# Patient Record
Sex: FEMALE | Race: WHITE | Employment: UNEMPLOYED | URBAN - METROPOLITAN AREA
[De-identification: names, ages, dates, MRNs, and addresses within clinical notes are randomized per-mention and may not be internally consistent; named-entity substitution may affect disease eponyms.]

---

## 2017-02-13 ENCOUNTER — HOSPITAL ENCOUNTER (OUTPATIENT)
Dept: ONCOLOGY | Age: 42
Discharge: HOME | End: 2017-02-28
Attending: INTERNAL MEDICINE | Admitting: INTERNAL MEDICINE

## 2017-02-13 ENCOUNTER — HOSPITAL ENCOUNTER (OUTPATIENT)
Dept: ONCOLOGY | Age: 42
Discharge: HOME | End: 2017-02-13
Admitting: INTERNAL MEDICINE

## 2017-02-13 VITALS — HEART RATE: 88 BPM | DIASTOLIC BLOOD PRESSURE: 73 MMHG | SYSTOLIC BLOOD PRESSURE: 94 MMHG

## 2017-02-13 DIAGNOSIS — G99.2 COPPER DEFICIENCY MYELONEUROPATHY (HCC): ICD-10-CM

## 2017-02-13 DIAGNOSIS — G63 COPPER DEFICIENCY MYELONEUROPATHY (HCC): ICD-10-CM

## 2017-02-13 DIAGNOSIS — E61.0 COPPER DEFICIENCY MYELONEUROPATHY (HCC): ICD-10-CM

## 2017-02-13 RX ORDER — SODIUM CHLORIDE 0.9 % (FLUSH) 0.9 %
SYRINGE (ML) INJECTION
Status: DISPENSED
Start: 2017-02-13 | End: 2017-02-14

## 2017-02-13 RX ORDER — SODIUM CHLORIDE 9 MG/ML
INJECTION, SOLUTION INTRAVENOUS
Status: DISPENSED
Start: 2017-02-13 | End: 2017-02-14

## 2017-02-13 RX ORDER — CLONIDINE HYDROCHLORIDE 0.1 MG/1
0.1 TABLET ORAL EVERY 12 HOURS
Status: ON HOLD | COMMUNITY
End: 2020-01-01 | Stop reason: HOSPADM

## 2017-02-14 ENCOUNTER — HOSPITAL ENCOUNTER (OUTPATIENT)
Dept: ONCOLOGY | Age: 42
Discharge: HOME | End: 2017-02-14
Admitting: INTERNAL MEDICINE

## 2017-02-14 VITALS
HEART RATE: 96 BPM | SYSTOLIC BLOOD PRESSURE: 99 MMHG | TEMPERATURE: 97.3 F | RESPIRATION RATE: 17 BRPM | DIASTOLIC BLOOD PRESSURE: 71 MMHG

## 2017-02-14 DIAGNOSIS — G99.2 COPPER DEFICIENCY MYELONEUROPATHY (HCC): ICD-10-CM

## 2017-02-14 DIAGNOSIS — G63 COPPER DEFICIENCY MYELONEUROPATHY (HCC): ICD-10-CM

## 2017-02-14 DIAGNOSIS — E61.0 COPPER DEFICIENCY MYELONEUROPATHY (HCC): ICD-10-CM

## 2017-02-14 RX ORDER — SODIUM CHLORIDE 9 MG/ML
INJECTION, SOLUTION INTRAVENOUS
Status: DISPENSED
Start: 2017-02-14 | End: 2017-02-15

## 2017-02-14 RX ORDER — SODIUM CHLORIDE 0.9 % (FLUSH) 0.9 %
SYRINGE (ML) INJECTION
Status: DISPENSED
Start: 2017-02-14 | End: 2017-02-15

## 2017-02-15 ENCOUNTER — HOSPITAL ENCOUNTER (OUTPATIENT)
Dept: ONCOLOGY | Age: 42
Discharge: HOME | End: 2017-02-15
Admitting: INTERNAL MEDICINE

## 2017-02-15 VITALS
HEART RATE: 86 BPM | TEMPERATURE: 97.3 F | RESPIRATION RATE: 16 BRPM | SYSTOLIC BLOOD PRESSURE: 115 MMHG | DIASTOLIC BLOOD PRESSURE: 75 MMHG

## 2017-02-15 DIAGNOSIS — G99.2 COPPER DEFICIENCY MYELONEUROPATHY (HCC): ICD-10-CM

## 2017-02-15 DIAGNOSIS — E61.0 COPPER DEFICIENCY MYELONEUROPATHY (HCC): ICD-10-CM

## 2017-02-15 DIAGNOSIS — G63 COPPER DEFICIENCY MYELONEUROPATHY (HCC): ICD-10-CM

## 2017-02-17 ENCOUNTER — HOSPITAL ENCOUNTER (OUTPATIENT)
Dept: ONCOLOGY | Age: 42
Discharge: HOME | End: 2017-02-17
Admitting: INTERNAL MEDICINE

## 2017-02-17 VITALS — HEART RATE: 102 BPM | TEMPERATURE: 99.1 F | SYSTOLIC BLOOD PRESSURE: 112 MMHG | DIASTOLIC BLOOD PRESSURE: 75 MMHG

## 2017-02-17 DIAGNOSIS — G99.2 COPPER DEFICIENCY MYELONEUROPATHY (HCC): ICD-10-CM

## 2017-02-17 DIAGNOSIS — G63 COPPER DEFICIENCY MYELONEUROPATHY (HCC): ICD-10-CM

## 2017-02-17 DIAGNOSIS — E61.0 COPPER DEFICIENCY MYELONEUROPATHY (HCC): ICD-10-CM

## 2017-02-17 RX ORDER — SODIUM CHLORIDE 9 MG/ML
INJECTION, SOLUTION INTRAVENOUS
Status: DISPENSED
Start: 2017-02-17 | End: 2017-02-18

## 2017-02-17 RX ORDER — SODIUM CHLORIDE 0.9 % (FLUSH) 0.9 %
SYRINGE (ML) INJECTION
Status: DISPENSED
Start: 2017-02-17 | End: 2017-02-18

## 2017-02-24 ENCOUNTER — HOSPITAL ENCOUNTER (OUTPATIENT)
Dept: ONCOLOGY | Age: 42
Discharge: HOME | End: 2017-02-24
Admitting: INTERNAL MEDICINE

## 2017-02-24 VITALS
DIASTOLIC BLOOD PRESSURE: 75 MMHG | RESPIRATION RATE: 16 BRPM | HEART RATE: 91 BPM | SYSTOLIC BLOOD PRESSURE: 112 MMHG | TEMPERATURE: 98.6 F

## 2017-02-24 DIAGNOSIS — E61.0 COPPER DEFICIENCY MYELONEUROPATHY (HCC): ICD-10-CM

## 2017-02-24 DIAGNOSIS — G99.2 COPPER DEFICIENCY MYELONEUROPATHY (HCC): ICD-10-CM

## 2017-02-24 DIAGNOSIS — G63 COPPER DEFICIENCY MYELONEUROPATHY (HCC): ICD-10-CM

## 2017-02-27 ENCOUNTER — HOSPITAL ENCOUNTER (OUTPATIENT)
Dept: ONCOLOGY | Age: 42
Discharge: HOME | End: 2017-02-27
Admitting: INTERNAL MEDICINE

## 2017-02-27 RX ORDER — SODIUM CHLORIDE 9 MG/ML
INJECTION, SOLUTION INTRAVENOUS
Status: DISCONTINUED
Start: 2017-02-27 | End: 2017-03-01 | Stop reason: HOSPADM

## 2017-02-27 RX ORDER — SODIUM CHLORIDE 0.9 % (FLUSH) 0.9 %
SYRINGE (ML) INJECTION
Status: DISCONTINUED
Start: 2017-02-27 | End: 2017-03-01 | Stop reason: HOSPADM

## 2017-03-01 ENCOUNTER — HOSPITAL ENCOUNTER (OUTPATIENT)
Dept: ONCOLOGY | Age: 42
Discharge: HOME | End: 2017-03-31
Attending: INTERNAL MEDICINE | Admitting: INTERNAL MEDICINE

## 2020-01-01 ENCOUNTER — TELEPHONE (OUTPATIENT)
Dept: NEUROLOGY | Age: 45
End: 2020-01-01

## 2020-01-01 ENCOUNTER — APPOINTMENT (OUTPATIENT)
Dept: GENERAL RADIOLOGY | Age: 45
DRG: 710 | End: 2020-01-01
Payer: COMMERCIAL

## 2020-01-01 ENCOUNTER — APPOINTMENT (OUTPATIENT)
Dept: MRI IMAGING | Age: 45
DRG: 134 | End: 2020-01-01
Payer: COMMERCIAL

## 2020-01-01 ENCOUNTER — APPOINTMENT (OUTPATIENT)
Dept: CT IMAGING | Age: 45
DRG: 134 | End: 2020-01-01
Payer: COMMERCIAL

## 2020-01-01 ENCOUNTER — HOSPITAL ENCOUNTER (INPATIENT)
Age: 45
LOS: 5 days | Discharge: NURSING, REHAB OR LONG-TERM CARE | DRG: 134 | End: 2020-09-23
Attending: STUDENT IN AN ORGANIZED HEALTH CARE EDUCATION/TRAINING PROGRAM | Admitting: INTERNAL MEDICINE
Payer: COMMERCIAL

## 2020-01-01 ENCOUNTER — APPOINTMENT (OUTPATIENT)
Dept: GENERAL RADIOLOGY | Age: 45
DRG: 134 | End: 2020-01-01
Payer: COMMERCIAL

## 2020-01-01 ENCOUNTER — HOSPITAL ENCOUNTER (INPATIENT)
Age: 45
LOS: 10 days | DRG: 710 | End: 2020-11-19
Attending: EMERGENCY MEDICINE | Admitting: INTERNAL MEDICINE
Payer: COMMERCIAL

## 2020-01-01 ENCOUNTER — APPOINTMENT (OUTPATIENT)
Dept: CT IMAGING | Age: 45
DRG: 137 | End: 2020-01-01
Payer: COMMERCIAL

## 2020-01-01 ENCOUNTER — APPOINTMENT (OUTPATIENT)
Dept: CT IMAGING | Age: 45
DRG: 710 | End: 2020-01-01
Payer: COMMERCIAL

## 2020-01-01 ENCOUNTER — APPOINTMENT (OUTPATIENT)
Dept: GENERAL RADIOLOGY | Age: 45
DRG: 137 | End: 2020-01-01
Payer: COMMERCIAL

## 2020-01-01 ENCOUNTER — HOSPITAL ENCOUNTER (INPATIENT)
Age: 45
LOS: 2 days | Discharge: NURSING, REHAB OR LONG-TERM CARE | DRG: 137 | End: 2020-11-06
Attending: EMERGENCY MEDICINE | Admitting: INTERNAL MEDICINE
Payer: COMMERCIAL

## 2020-01-01 VITALS
TEMPERATURE: 97.7 F | HEIGHT: 67 IN | HEART RATE: 77 BPM | SYSTOLIC BLOOD PRESSURE: 118 MMHG | WEIGHT: 132.5 LBS | BODY MASS INDEX: 20.8 KG/M2 | OXYGEN SATURATION: 92 % | RESPIRATION RATE: 16 BRPM | DIASTOLIC BLOOD PRESSURE: 75 MMHG

## 2020-01-01 VITALS
DIASTOLIC BLOOD PRESSURE: 69 MMHG | TEMPERATURE: 98.6 F | SYSTOLIC BLOOD PRESSURE: 102 MMHG | OXYGEN SATURATION: 83 % | HEIGHT: 67 IN | BODY MASS INDEX: 24.64 KG/M2 | RESPIRATION RATE: 20 BRPM | HEART RATE: 103 BPM | WEIGHT: 157 LBS

## 2020-01-01 VITALS
OXYGEN SATURATION: 91 % | BODY MASS INDEX: 24.91 KG/M2 | TEMPERATURE: 97.8 F | DIASTOLIC BLOOD PRESSURE: 80 MMHG | HEIGHT: 67 IN | SYSTOLIC BLOOD PRESSURE: 109 MMHG | HEART RATE: 79 BPM | RESPIRATION RATE: 17 BRPM | WEIGHT: 158.73 LBS

## 2020-01-01 LAB
A/G RATIO: 0.9 (ref 1.1–2.2)
A/G RATIO: 1 (ref 1.1–2.2)
A/G RATIO: 1.1 (ref 1.1–2.2)
A/G RATIO: 1.1 (ref 1.1–2.2)
A/G RATIO: 1.2 (ref 1.1–2.2)
A/G RATIO: 1.3 (ref 1.1–2.2)
A/G RATIO: 1.7 (ref 1.1–2.2)
ABO/RH: NORMAL
ABO/RH: NORMAL
ACETAMINOPHEN LEVEL: <5 UG/ML (ref 10–30)
ACETAMINOPHEN LEVEL: <5 UG/ML (ref 10–30)
ALBUMIN SERPL-MCNC: 2.1 G/DL (ref 3.4–5)
ALBUMIN SERPL-MCNC: 2.3 G/DL (ref 3.4–5)
ALBUMIN SERPL-MCNC: 2.3 G/DL (ref 3.4–5)
ALBUMIN SERPL-MCNC: 2.4 G/DL (ref 3.4–5)
ALBUMIN SERPL-MCNC: 2.4 G/DL (ref 3.4–5)
ALBUMIN SERPL-MCNC: 2.5 G/DL (ref 3.4–5)
ALBUMIN SERPL-MCNC: 2.6 G/DL (ref 3.4–5)
ALBUMIN SERPL-MCNC: 2.6 G/DL (ref 3.4–5)
ALBUMIN SERPL-MCNC: 2.8 G/DL (ref 3.4–5)
ALBUMIN SERPL-MCNC: 4 G/DL (ref 3.4–5)
ALP BLD-CCNC: 105 U/L (ref 40–129)
ALP BLD-CCNC: 117 U/L (ref 40–129)
ALP BLD-CCNC: 161 U/L (ref 40–129)
ALP BLD-CCNC: 72 U/L (ref 40–129)
ALP BLD-CCNC: 78 U/L (ref 40–129)
ALP BLD-CCNC: 78 U/L (ref 40–129)
ALP BLD-CCNC: 83 U/L (ref 40–129)
ALP BLD-CCNC: 85 U/L (ref 40–129)
ALP BLD-CCNC: 85 U/L (ref 40–129)
ALP BLD-CCNC: 91 U/L (ref 40–129)
ALP BLD-CCNC: 95 U/L (ref 40–129)
ALP BLD-CCNC: 97 U/L (ref 40–129)
ALT SERPL-CCNC: 23 U/L (ref 10–40)
ALT SERPL-CCNC: 24 U/L (ref 10–40)
ALT SERPL-CCNC: 26 U/L (ref 10–40)
ALT SERPL-CCNC: 27 U/L (ref 10–40)
ALT SERPL-CCNC: 27 U/L (ref 10–40)
ALT SERPL-CCNC: 28 U/L (ref 10–40)
ALT SERPL-CCNC: 31 U/L (ref 10–40)
ALT SERPL-CCNC: 34 U/L (ref 10–40)
ALT SERPL-CCNC: 39 U/L (ref 10–40)
ALT SERPL-CCNC: 42 U/L (ref 10–40)
ALT SERPL-CCNC: 54 U/L (ref 10–40)
ALT SERPL-CCNC: 70 U/L (ref 10–40)
AMMONIA: 32 UMOL/L (ref 11–51)
AMPHETAMINE SCREEN, URINE: NORMAL
AMPHETAMINE SCREEN, URINE: NORMAL
ANION GAP SERPL CALCULATED.3IONS-SCNC: 0 MMOL/L (ref 3–16)
ANION GAP SERPL CALCULATED.3IONS-SCNC: 0 MMOL/L (ref 3–16)
ANION GAP SERPL CALCULATED.3IONS-SCNC: 1 MMOL/L (ref 3–16)
ANION GAP SERPL CALCULATED.3IONS-SCNC: 10 MMOL/L (ref 3–16)
ANION GAP SERPL CALCULATED.3IONS-SCNC: 10 MMOL/L (ref 3–16)
ANION GAP SERPL CALCULATED.3IONS-SCNC: 11 MMOL/L (ref 3–16)
ANION GAP SERPL CALCULATED.3IONS-SCNC: 2 MMOL/L (ref 3–16)
ANION GAP SERPL CALCULATED.3IONS-SCNC: 3 MMOL/L (ref 3–16)
ANION GAP SERPL CALCULATED.3IONS-SCNC: 3 MMOL/L (ref 3–16)
ANION GAP SERPL CALCULATED.3IONS-SCNC: 4 MMOL/L (ref 3–16)
ANION GAP SERPL CALCULATED.3IONS-SCNC: 5 MMOL/L (ref 3–16)
ANION GAP SERPL CALCULATED.3IONS-SCNC: 6 MMOL/L (ref 3–16)
ANION GAP SERPL CALCULATED.3IONS-SCNC: 7 MMOL/L (ref 3–16)
ANION GAP SERPL CALCULATED.3IONS-SCNC: 7 MMOL/L (ref 3–16)
ANION GAP SERPL CALCULATED.3IONS-SCNC: 8 MMOL/L (ref 3–16)
ANION GAP SERPL CALCULATED.3IONS-SCNC: 8 MMOL/L (ref 3–16)
ANION GAP SERPL CALCULATED.3IONS-SCNC: 9 MMOL/L (ref 3–16)
ANTIBODY SCREEN: NORMAL
APTT: 170 SEC (ref 24.2–36.2)
APTT: 26.6 SEC (ref 24.2–36.2)
APTT: 32.2 SEC (ref 24.2–36.2)
APTT: 35.1 SEC (ref 24.2–36.2)
APTT: 39.9 SEC (ref 24.2–36.2)
APTT: 47.2 SEC (ref 24.2–36.2)
AST SERPL-CCNC: 192 U/L (ref 15–37)
AST SERPL-CCNC: 20 U/L (ref 15–37)
AST SERPL-CCNC: 20 U/L (ref 15–37)
AST SERPL-CCNC: 21 U/L (ref 15–37)
AST SERPL-CCNC: 22 U/L (ref 15–37)
AST SERPL-CCNC: 22 U/L (ref 15–37)
AST SERPL-CCNC: 24 U/L (ref 15–37)
AST SERPL-CCNC: 35 U/L (ref 15–37)
AST SERPL-CCNC: 36 U/L (ref 15–37)
AST SERPL-CCNC: 41 U/L (ref 15–37)
AST SERPL-CCNC: 59 U/L (ref 15–37)
AST SERPL-CCNC: 74 U/L (ref 15–37)
BACTERIA: ABNORMAL /HPF
BANDED NEUTROPHILS RELATIVE PERCENT: 3 % (ref 0–7)
BARBITURATE SCREEN URINE: NORMAL
BARBITURATE SCREEN URINE: NORMAL
BASE EXCESS ARTERIAL: 11.5 MMOL/L (ref -3–3)
BASE EXCESS ARTERIAL: 13.1 MMOL/L (ref -3–3)
BASE EXCESS ARTERIAL: 13.7 MMOL/L (ref -3–3)
BASE EXCESS ARTERIAL: 17.9 MMOL/L (ref -3–3)
BASE EXCESS ARTERIAL: 19.2 MMOL/L (ref -3–3)
BASE EXCESS ARTERIAL: 20.2 MMOL/L (ref -3–3)
BASE EXCESS ARTERIAL: 21.9 MMOL/L (ref -3–3)
BASE EXCESS ARTERIAL: 22 MMOL/L (ref -3–3)
BASE EXCESS ARTERIAL: 22.8 MMOL/L (ref -3–3)
BASE EXCESS ARTERIAL: 22.9 MMOL/L (ref -3–3)
BASE EXCESS ARTERIAL: 4 (ref -3–3)
BASE EXCESS ARTERIAL: 6.4 MMOL/L (ref -3–3)
BASE EXCESS ARTERIAL: 7 (ref -3–3)
BASOPHILS ABSOLUTE: 0 K/UL (ref 0–0.2)
BASOPHILS ABSOLUTE: 0.1 K/UL (ref 0–0.2)
BASOPHILS ABSOLUTE: 0.1 K/UL (ref 0–0.2)
BASOPHILS RELATIVE PERCENT: 0 %
BASOPHILS RELATIVE PERCENT: 0.1 %
BASOPHILS RELATIVE PERCENT: 0.2 %
BASOPHILS RELATIVE PERCENT: 0.3 %
BASOPHILS RELATIVE PERCENT: 0.4 %
BASOPHILS RELATIVE PERCENT: 0.4 %
BASOPHILS RELATIVE PERCENT: 0.9 %
BENZODIAZEPINE SCREEN, URINE: NORMAL
BENZODIAZEPINE SCREEN, URINE: NORMAL
BILIRUB SERPL-MCNC: 0.3 MG/DL (ref 0–1)
BILIRUB SERPL-MCNC: 0.4 MG/DL (ref 0–1)
BILIRUB SERPL-MCNC: 0.4 MG/DL (ref 0–1)
BILIRUB SERPL-MCNC: 0.5 MG/DL (ref 0–1)
BILIRUB SERPL-MCNC: 1 MG/DL (ref 0–1)
BILIRUB SERPL-MCNC: 1.2 MG/DL (ref 0–1)
BILIRUB SERPL-MCNC: 1.2 MG/DL (ref 0–1)
BILIRUB SERPL-MCNC: <0.2 MG/DL (ref 0–1)
BILIRUBIN URINE: ABNORMAL
BILIRUBIN URINE: NEGATIVE
BILIRUBIN URINE: NEGATIVE
BLOOD BANK DISPENSE STATUS: NORMAL
BLOOD BANK DISPENSE STATUS: NORMAL
BLOOD BANK PRODUCT CODE: NORMAL
BLOOD BANK PRODUCT CODE: NORMAL
BLOOD CULTURE, ROUTINE: NORMAL
BLOOD, URINE: NEGATIVE
BPU ID: NORMAL
BPU ID: NORMAL
BUN BLDV-MCNC: 10 MG/DL (ref 7–20)
BUN BLDV-MCNC: 10 MG/DL (ref 7–20)
BUN BLDV-MCNC: 14 MG/DL (ref 7–20)
BUN BLDV-MCNC: 14 MG/DL (ref 7–20)
BUN BLDV-MCNC: 15 MG/DL (ref 7–20)
BUN BLDV-MCNC: 16 MG/DL (ref 7–20)
BUN BLDV-MCNC: 16 MG/DL (ref 7–20)
BUN BLDV-MCNC: 19 MG/DL (ref 7–20)
BUN BLDV-MCNC: 21 MG/DL (ref 7–20)
BUN BLDV-MCNC: 22 MG/DL (ref 7–20)
BUN BLDV-MCNC: 4 MG/DL (ref 7–20)
BUN BLDV-MCNC: 5 MG/DL (ref 7–20)
BUN BLDV-MCNC: 5 MG/DL (ref 7–20)
BUN BLDV-MCNC: 6 MG/DL (ref 7–20)
BUN BLDV-MCNC: 7 MG/DL (ref 7–20)
BUN BLDV-MCNC: 8 MG/DL (ref 7–20)
BUN BLDV-MCNC: 9 MG/DL (ref 7–20)
C-REACTIVE PROTEIN: 13.9 MG/L (ref 0–5.1)
C-REACTIVE PROTEIN: 18.4 MG/L (ref 0–5.1)
C-REACTIVE PROTEIN: 22.8 MG/L (ref 0–5.1)
C-REACTIVE PROTEIN: 24.5 MG/L (ref 0–5.1)
C-REACTIVE PROTEIN: 25.4 MG/L (ref 0–5.1)
C-REACTIVE PROTEIN: 29.5 MG/L (ref 0–5.1)
C-REACTIVE PROTEIN: 98.1 MG/L (ref 0–5.1)
CALCIUM SERPL-MCNC: 7.1 MG/DL (ref 8.3–10.6)
CALCIUM SERPL-MCNC: 7.6 MG/DL (ref 8.3–10.6)
CALCIUM SERPL-MCNC: 7.9 MG/DL (ref 8.3–10.6)
CALCIUM SERPL-MCNC: 7.9 MG/DL (ref 8.3–10.6)
CALCIUM SERPL-MCNC: 8 MG/DL (ref 8.3–10.6)
CALCIUM SERPL-MCNC: 8 MG/DL (ref 8.3–10.6)
CALCIUM SERPL-MCNC: 8.1 MG/DL (ref 8.3–10.6)
CALCIUM SERPL-MCNC: 8.1 MG/DL (ref 8.3–10.6)
CALCIUM SERPL-MCNC: 8.2 MG/DL (ref 8.3–10.6)
CALCIUM SERPL-MCNC: 8.2 MG/DL (ref 8.3–10.6)
CALCIUM SERPL-MCNC: 8.4 MG/DL (ref 8.3–10.6)
CALCIUM SERPL-MCNC: 8.5 MG/DL (ref 8.3–10.6)
CALCIUM SERPL-MCNC: 8.7 MG/DL (ref 8.3–10.6)
CALCIUM SERPL-MCNC: 8.8 MG/DL (ref 8.3–10.6)
CALCIUM SERPL-MCNC: 8.8 MG/DL (ref 8.3–10.6)
CALCIUM SERPL-MCNC: 9 MG/DL (ref 8.3–10.6)
CALCIUM SERPL-MCNC: 9 MG/DL (ref 8.3–10.6)
CALCIUM SERPL-MCNC: 9.1 MG/DL (ref 8.3–10.6)
CALCIUM SERPL-MCNC: 9.2 MG/DL (ref 8.3–10.6)
CALCIUM SERPL-MCNC: 9.2 MG/DL (ref 8.3–10.6)
CANNABINOID SCREEN URINE: NORMAL
CANNABINOID SCREEN URINE: NORMAL
CARBOXYHEMOGLOBIN ARTERIAL: 0.5 % (ref 0–1.5)
CARBOXYHEMOGLOBIN ARTERIAL: 0.9 % (ref 0–1.5)
CARBOXYHEMOGLOBIN ARTERIAL: 0.9 % (ref 0–1.5)
CARBOXYHEMOGLOBIN ARTERIAL: 1 % (ref 0–1.5)
CARBOXYHEMOGLOBIN ARTERIAL: 1.2 % (ref 0–1.5)
CARBOXYHEMOGLOBIN ARTERIAL: 1.4 % (ref 0–1.5)
CARBOXYHEMOGLOBIN ARTERIAL: 1.4 % (ref 0–1.5)
CARBOXYHEMOGLOBIN ARTERIAL: 1.5 % (ref 0–1.5)
CARBOXYHEMOGLOBIN ARTERIAL: 1.6 % (ref 0–1.5)
CARBOXYHEMOGLOBIN ARTERIAL: 3.7 % (ref 0–1.5)
CARBOXYHEMOGLOBIN ARTERIAL: 3.9 % (ref 0–1.5)
CHLORIDE BLD-SCNC: 100 MMOL/L (ref 99–110)
CHLORIDE BLD-SCNC: 101 MMOL/L (ref 99–110)
CHLORIDE BLD-SCNC: 101 MMOL/L (ref 99–110)
CHLORIDE BLD-SCNC: 102 MMOL/L (ref 99–110)
CHLORIDE BLD-SCNC: 102 MMOL/L (ref 99–110)
CHLORIDE BLD-SCNC: 103 MMOL/L (ref 99–110)
CHLORIDE BLD-SCNC: 104 MMOL/L (ref 99–110)
CHLORIDE BLD-SCNC: 104 MMOL/L (ref 99–110)
CHLORIDE BLD-SCNC: 105 MMOL/L (ref 99–110)
CHLORIDE BLD-SCNC: 106 MMOL/L (ref 99–110)
CHLORIDE BLD-SCNC: 96 MMOL/L (ref 99–110)
CHLORIDE BLD-SCNC: 96 MMOL/L (ref 99–110)
CHLORIDE BLD-SCNC: 97 MMOL/L (ref 99–110)
CHLORIDE BLD-SCNC: 97 MMOL/L (ref 99–110)
CHLORIDE BLD-SCNC: 98 MMOL/L (ref 99–110)
CHLORIDE BLD-SCNC: 99 MMOL/L (ref 99–110)
CLARITY: ABNORMAL
CLARITY: ABNORMAL
CLARITY: CLEAR
CO2: 26 MMOL/L (ref 21–32)
CO2: 31 MMOL/L (ref 21–32)
CO2: 32 MMOL/L (ref 21–32)
CO2: 32 MMOL/L (ref 21–32)
CO2: 33 MMOL/L (ref 21–32)
CO2: 34 MMOL/L (ref 21–32)
CO2: 35 MMOL/L (ref 21–32)
CO2: 35 MMOL/L (ref 21–32)
CO2: 36 MMOL/L (ref 21–32)
CO2: 37 MMOL/L (ref 21–32)
CO2: 39 MMOL/L (ref 21–32)
CO2: 40 MMOL/L (ref 21–32)
CO2: 41 MMOL/L (ref 21–32)
CO2: 43 MMOL/L (ref 21–32)
CO2: 43 MMOL/L (ref 21–32)
CO2: 44 MMOL/L (ref 21–32)
CO2: 45 MMOL/L (ref 21–32)
CO2: 45 MMOL/L (ref 21–32)
CO2: 46 MMOL/L (ref 21–32)
CO2: 48 MMOL/L (ref 21–32)
COCAINE METABOLITE SCREEN URINE: NORMAL
COCAINE METABOLITE SCREEN URINE: NORMAL
COLOR: YELLOW
COMMENT UA: ABNORMAL
COPPER: 28.6 UG/DL (ref 80–155)
CREAT SERPL-MCNC: 0.6 MG/DL (ref 0.6–1.1)
CREAT SERPL-MCNC: <0.5 MG/DL (ref 0.6–1.1)
CULTURE, BLOOD 2: NORMAL
D DIMER: 205 NG/ML DDU (ref 0–229)
D DIMER: 216 NG/ML DDU (ref 0–229)
D DIMER: 218 NG/ML DDU (ref 0–229)
D DIMER: 218 NG/ML DDU (ref 0–229)
D DIMER: 237 NG/ML DDU (ref 0–229)
D DIMER: 291 NG/ML DDU (ref 0–229)
D DIMER: 300 NG/ML DDU (ref 0–229)
D DIMER: 305 NG/ML DDU (ref 0–229)
D DIMER: 362 NG/ML DDU (ref 0–229)
DESCRIPTION BLOOD BANK: NORMAL
DESCRIPTION BLOOD BANK: NORMAL
EKG ATRIAL RATE: 100 BPM
EKG ATRIAL RATE: 101 BPM
EKG ATRIAL RATE: 129 BPM
EKG ATRIAL RATE: 168 BPM
EKG ATRIAL RATE: 288 BPM
EKG ATRIAL RATE: 66 BPM
EKG ATRIAL RATE: 92 BPM
EKG ATRIAL RATE: 96 BPM
EKG DIAGNOSIS: NORMAL
EKG P AXIS: 48 DEGREES
EKG P AXIS: 48 DEGREES
EKG P AXIS: 57 DEGREES
EKG P AXIS: 67 DEGREES
EKG P AXIS: 78 DEGREES
EKG P AXIS: 82 DEGREES
EKG P AXIS: 85 DEGREES
EKG P-R INTERVAL: 120 MS
EKG P-R INTERVAL: 128 MS
EKG P-R INTERVAL: 130 MS
EKG P-R INTERVAL: 134 MS
EKG P-R INTERVAL: 140 MS
EKG P-R INTERVAL: 142 MS
EKG P-R INTERVAL: 142 MS
EKG Q-T INTERVAL: 234 MS
EKG Q-T INTERVAL: 296 MS
EKG Q-T INTERVAL: 322 MS
EKG Q-T INTERVAL: 328 MS
EKG Q-T INTERVAL: 336 MS
EKG Q-T INTERVAL: 360 MS
EKG Q-T INTERVAL: 400 MS
EKG Q-T INTERVAL: 402 MS
EKG QRS DURATION: 106 MS
EKG QRS DURATION: 106 MS
EKG QRS DURATION: 70 MS
EKG QRS DURATION: 80 MS
EKG QRS DURATION: 82 MS
EKG QRS DURATION: 88 MS
EKG QRS DURATION: 88 MS
EKG QRS DURATION: 96 MS
EKG QTC CALCULATION (BAZETT): 391 MS
EKG QTC CALCULATION (BAZETT): 415 MS
EKG QTC CALCULATION (BAZETT): 421 MS
EKG QTC CALCULATION (BAZETT): 423 MS
EKG QTC CALCULATION (BAZETT): 433 MS
EKG QTC CALCULATION (BAZETT): 451 MS
EKG QTC CALCULATION (BAZETT): 466 MS
EKG QTC CALCULATION (BAZETT): 505 MS
EKG R AXIS: 20 DEGREES
EKG R AXIS: 39 DEGREES
EKG R AXIS: 40 DEGREES
EKG R AXIS: 44 DEGREES
EKG R AXIS: 52 DEGREES
EKG R AXIS: 59 DEGREES
EKG R AXIS: 78 DEGREES
EKG R AXIS: 79 DEGREES
EKG T AXIS: 142 DEGREES
EKG T AXIS: 237 DEGREES
EKG T AXIS: 251 DEGREES
EKG T AXIS: 252 DEGREES
EKG T AXIS: 56 DEGREES
EKG T AXIS: 59 DEGREES
EKG T AXIS: 75 DEGREES
EKG T AXIS: 88 DEGREES
EKG VENTRICULAR RATE: 100 BPM
EKG VENTRICULAR RATE: 101 BPM
EKG VENTRICULAR RATE: 118 BPM
EKG VENTRICULAR RATE: 129 BPM
EKG VENTRICULAR RATE: 168 BPM
EKG VENTRICULAR RATE: 66 BPM
EKG VENTRICULAR RATE: 92 BPM
EKG VENTRICULAR RATE: 96 BPM
EOSINOPHILS ABSOLUTE: 0 K/UL (ref 0–0.6)
EOSINOPHILS RELATIVE PERCENT: 0 %
EOSINOPHILS RELATIVE PERCENT: 0.1 %
EOSINOPHILS RELATIVE PERCENT: 0.2 %
EOSINOPHILS RELATIVE PERCENT: 0.3 %
EOSINOPHILS RELATIVE PERCENT: 0.3 %
EPITHELIAL CELLS, UA: 3 /HPF (ref 0–5)
EPITHELIAL CELLS, UA: ABNORMAL /HPF (ref 0–5)
ETHANOL: NORMAL MG/DL (ref 0–0.08)
ETHANOL: NORMAL MG/DL (ref 0–0.08)
FERRITIN: 1375 NG/ML (ref 15–150)
FIBRINOGEN: 272 MG/DL (ref 200–397)
FOLATE: 17.16 NG/ML (ref 4.78–24.2)
GFR AFRICAN AMERICAN: >60
GFR NON-AFRICAN AMERICAN: >60
GLOBULIN: 1.6 G/DL
GLOBULIN: 2 G/DL
GLOBULIN: 2 G/DL
GLOBULIN: 2.1 G/DL
GLOBULIN: 2.2 G/DL
GLOBULIN: 2.3 G/DL
GLOBULIN: 2.4 G/DL
GLOBULIN: 2.6 G/DL
GLOBULIN: 2.7 G/DL
GLUCOSE BLD-MCNC: 101 MG/DL (ref 70–99)
GLUCOSE BLD-MCNC: 109 MG/DL (ref 70–99)
GLUCOSE BLD-MCNC: 110 MG/DL (ref 70–99)
GLUCOSE BLD-MCNC: 114 MG/DL (ref 70–99)
GLUCOSE BLD-MCNC: 114 MG/DL (ref 70–99)
GLUCOSE BLD-MCNC: 115 MG/DL (ref 70–99)
GLUCOSE BLD-MCNC: 119 MG/DL (ref 70–99)
GLUCOSE BLD-MCNC: 119 MG/DL (ref 70–99)
GLUCOSE BLD-MCNC: 124 MG/DL (ref 70–99)
GLUCOSE BLD-MCNC: 135 MG/DL (ref 70–99)
GLUCOSE BLD-MCNC: 140 MG/DL (ref 70–99)
GLUCOSE BLD-MCNC: 147 MG/DL (ref 70–99)
GLUCOSE BLD-MCNC: 152 MG/DL (ref 70–99)
GLUCOSE BLD-MCNC: 160 MG/DL (ref 70–99)
GLUCOSE BLD-MCNC: 163 MG/DL (ref 70–99)
GLUCOSE BLD-MCNC: 163 MG/DL (ref 70–99)
GLUCOSE BLD-MCNC: 179 MG/DL (ref 70–99)
GLUCOSE BLD-MCNC: 216 MG/DL (ref 70–99)
GLUCOSE BLD-MCNC: 233 MG/DL (ref 70–99)
GLUCOSE BLD-MCNC: 280 MG/DL (ref 70–99)
GLUCOSE BLD-MCNC: 69 MG/DL (ref 70–99)
GLUCOSE BLD-MCNC: 72 MG/DL (ref 70–99)
GLUCOSE BLD-MCNC: 86 MG/DL (ref 70–99)
GLUCOSE BLD-MCNC: 88 MG/DL (ref 70–99)
GLUCOSE BLD-MCNC: 89 MG/DL (ref 70–99)
GLUCOSE BLD-MCNC: 89 MG/DL (ref 70–99)
GLUCOSE BLD-MCNC: 91 MG/DL (ref 70–99)
GLUCOSE BLD-MCNC: 96 MG/DL (ref 70–99)
GLUCOSE URINE: NEGATIVE MG/DL
GRAM STAIN RESULT: ABNORMAL
HCG QUALITATIVE: NEGATIVE
HCG QUALITATIVE: NEGATIVE
HCO3 ARTERIAL: 31.7 MMOL/L (ref 21–29)
HCO3 ARTERIAL: 32.7 MMOL/L (ref 21–29)
HCO3 ARTERIAL: 33.9 MMOL/L (ref 21–29)
HCO3 ARTERIAL: 37.4 MMOL/L (ref 21–29)
HCO3 ARTERIAL: 41.4 MMOL/L (ref 21–29)
HCO3 ARTERIAL: 41.5 MMOL/L (ref 21–29)
HCO3 ARTERIAL: 46.3 MMOL/L (ref 21–29)
HCO3 ARTERIAL: 47 MMOL/L (ref 21–29)
HCO3 ARTERIAL: 47.3 MMOL/L (ref 21–29)
HCO3 ARTERIAL: 47.4 MMOL/L (ref 21–29)
HCO3 ARTERIAL: 48.5 MMOL/L (ref 21–29)
HCO3 ARTERIAL: 50 MMOL/L (ref 21–29)
HCO3 ARTERIAL: 50.7 MMOL/L (ref 21–29)
HCT VFR BLD CALC: 16.5 % (ref 36–48)
HCT VFR BLD CALC: 28.9 % (ref 36–48)
HCT VFR BLD CALC: 29.3 % (ref 36–48)
HCT VFR BLD CALC: 30 % (ref 36–48)
HCT VFR BLD CALC: 30.8 % (ref 36–48)
HCT VFR BLD CALC: 31 % (ref 36–48)
HCT VFR BLD CALC: 31.4 % (ref 36–48)
HCT VFR BLD CALC: 31.4 % (ref 36–48)
HCT VFR BLD CALC: 31.7 % (ref 36–48)
HCT VFR BLD CALC: 32 % (ref 36–48)
HCT VFR BLD CALC: 32 % (ref 36–48)
HCT VFR BLD CALC: 32.2 % (ref 36–48)
HCT VFR BLD CALC: 32.3 % (ref 36–48)
HCT VFR BLD CALC: 32.8 % (ref 36–48)
HCT VFR BLD CALC: 33 % (ref 36–48)
HCT VFR BLD CALC: 33 % (ref 36–48)
HCT VFR BLD CALC: 33.4 % (ref 36–48)
HCT VFR BLD CALC: 34.5 % (ref 36–48)
HCT VFR BLD CALC: 34.9 % (ref 36–48)
HCT VFR BLD CALC: 35.9 % (ref 36–48)
HCT VFR BLD CALC: 36.1 % (ref 36–48)
HCT VFR BLD CALC: 39.3 % (ref 36–48)
HEMOGLOBIN, ART, EXTENDED: 10 G/DL (ref 12–16)
HEMOGLOBIN, ART, EXTENDED: 10.1 G/DL (ref 12–16)
HEMOGLOBIN, ART, EXTENDED: 10.2 G/DL (ref 12–16)
HEMOGLOBIN, ART, EXTENDED: 10.3 G/DL (ref 12–16)
HEMOGLOBIN, ART, EXTENDED: 10.3 G/DL (ref 12–16)
HEMOGLOBIN, ART, EXTENDED: 10.7 G/DL (ref 12–16)
HEMOGLOBIN, ART, EXTENDED: 11.2 G/DL (ref 12–16)
HEMOGLOBIN, ART, EXTENDED: 11.4 G/DL (ref 12–16)
HEMOGLOBIN, ART, EXTENDED: 11.7 G/DL (ref 12–16)
HEMOGLOBIN, ART, EXTENDED: 12.1 G/DL (ref 12–16)
HEMOGLOBIN, ART, EXTENDED: 9.2 G/DL (ref 12–16)
HEMOGLOBIN: 10.1 G/DL (ref 12–16)
HEMOGLOBIN: 10.1 G/DL (ref 12–16)
HEMOGLOBIN: 10.2 G/DL (ref 12–16)
HEMOGLOBIN: 10.4 G/DL (ref 12–16)
HEMOGLOBIN: 10.4 G/DL (ref 12–16)
HEMOGLOBIN: 10.5 G/DL (ref 12–16)
HEMOGLOBIN: 11 G/DL (ref 12–16)
HEMOGLOBIN: 11.2 G/DL (ref 12–16)
HEMOGLOBIN: 11.3 G/DL (ref 12–16)
HEMOGLOBIN: 11.3 G/DL (ref 12–16)
HEMOGLOBIN: 12.2 G/DL (ref 12–16)
HEMOGLOBIN: 5.1 G/DL (ref 12–16)
HEMOGLOBIN: 8.8 G/DL (ref 12–16)
HEMOGLOBIN: 8.8 G/DL (ref 12–16)
HEMOGLOBIN: 9.5 G/DL (ref 12–16)
HEMOGLOBIN: 9.7 G/DL (ref 12–16)
HEMOGLOBIN: 9.8 G/DL (ref 12–16)
HYALINE CASTS: 13 /LPF (ref 0–8)
HYALINE CASTS: ABNORMAL /LPF (ref 0–2)
INR BLD: 0.97 (ref 0.86–1.14)
INR BLD: 1.21 (ref 0.86–1.14)
INR BLD: 1.25 (ref 0.86–1.14)
INR BLD: 1.6 (ref 0.86–1.14)
IRON SATURATION: 19 % (ref 15–50)
IRON: 56 UG/DL (ref 37–145)
KETONES, URINE: NEGATIVE MG/DL
L. PNEUMOPHILA SEROGP 1 UR AG: NORMAL
LACTATE DEHYDROGENASE: 165 U/L (ref 100–190)
LACTATE DEHYDROGENASE: 189 U/L (ref 100–190)
LACTATE DEHYDROGENASE: 227 U/L (ref 100–190)
LACTATE DEHYDROGENASE: 365 U/L (ref 100–190)
LACTATE: 4.85 MMOL/L (ref 0.4–2)
LACTIC ACID, SEPSIS: 0.4 MMOL/L (ref 0.4–1.9)
LACTIC ACID, SEPSIS: 0.6 MMOL/L (ref 0.4–1.9)
LACTIC ACID: 0.5 MMOL/L (ref 0.4–2)
LACTIC ACID: 1.7 MMOL/L (ref 0.4–2)
LACTIC ACID: 4.5 MMOL/L (ref 0.4–2)
LEFT VENTRICULAR EJECTION FRACTION HIGH VALUE: 50 %
LEFT VENTRICULAR EJECTION FRACTION MODE: NORMAL
LEUKOCYTE ESTERASE, URINE: NEGATIVE
LIPASE: 19 U/L (ref 13–60)
LIPASE: 9 U/L (ref 13–60)
LV EF: 50 %
LVEF MODALITY: NORMAL
LYMPHOCYTES ABSOLUTE: 0.2 K/UL (ref 1–5.1)
LYMPHOCYTES ABSOLUTE: 0.3 K/UL (ref 1–5.1)
LYMPHOCYTES ABSOLUTE: 0.4 K/UL (ref 1–5.1)
LYMPHOCYTES ABSOLUTE: 0.4 K/UL (ref 1–5.1)
LYMPHOCYTES ABSOLUTE: 0.5 K/UL (ref 1–5.1)
LYMPHOCYTES ABSOLUTE: 1 K/UL (ref 1–5.1)
LYMPHOCYTES ABSOLUTE: 2.1 K/UL (ref 1–5.1)
LYMPHOCYTES RELATIVE PERCENT: 1.1 %
LYMPHOCYTES RELATIVE PERCENT: 15.4 %
LYMPHOCYTES RELATIVE PERCENT: 3.1 %
LYMPHOCYTES RELATIVE PERCENT: 4.1 %
LYMPHOCYTES RELATIVE PERCENT: 4.3 %
LYMPHOCYTES RELATIVE PERCENT: 4.3 %
LYMPHOCYTES RELATIVE PERCENT: 4.4 %
LYMPHOCYTES RELATIVE PERCENT: 4.6 %
LYMPHOCYTES RELATIVE PERCENT: 4.8 %
LYMPHOCYTES RELATIVE PERCENT: 6 %
LYMPHOCYTES RELATIVE PERCENT: 6.3 %
LYMPHOCYTES RELATIVE PERCENT: 6.8 %
LYMPHOCYTES RELATIVE PERCENT: 8 %
Lab: NORMAL
Lab: NORMAL
MACROCYTES: ABNORMAL
MAGNESIUM: 1.5 MG/DL (ref 1.8–2.4)
MAGNESIUM: 1.7 MG/DL (ref 1.8–2.4)
MAGNESIUM: 1.7 MG/DL (ref 1.8–2.4)
MAGNESIUM: 1.8 MG/DL (ref 1.8–2.4)
MAGNESIUM: 1.9 MG/DL (ref 1.8–2.4)
MAGNESIUM: 2.2 MG/DL (ref 1.8–2.4)
MCH RBC QN AUTO: 30.1 PG (ref 26–34)
MCH RBC QN AUTO: 30.2 PG (ref 26–34)
MCH RBC QN AUTO: 30.2 PG (ref 26–34)
MCH RBC QN AUTO: 30.3 PG (ref 26–34)
MCH RBC QN AUTO: 30.9 PG (ref 26–34)
MCH RBC QN AUTO: 30.9 PG (ref 26–34)
MCH RBC QN AUTO: 31.1 PG (ref 26–34)
MCH RBC QN AUTO: 31.3 PG (ref 26–34)
MCH RBC QN AUTO: 32.2 PG (ref 26–34)
MCH RBC QN AUTO: 32.3 PG (ref 26–34)
MCH RBC QN AUTO: 32.5 PG (ref 26–34)
MCH RBC QN AUTO: 32.6 PG (ref 26–34)
MCH RBC QN AUTO: 33.1 PG (ref 26–34)
MCHC RBC AUTO-ENTMCNC: 30.4 G/DL (ref 31–36)
MCHC RBC AUTO-ENTMCNC: 30.5 G/DL (ref 31–36)
MCHC RBC AUTO-ENTMCNC: 30.7 G/DL (ref 31–36)
MCHC RBC AUTO-ENTMCNC: 30.8 G/DL (ref 31–36)
MCHC RBC AUTO-ENTMCNC: 30.9 G/DL (ref 31–36)
MCHC RBC AUTO-ENTMCNC: 31.1 G/DL (ref 31–36)
MCHC RBC AUTO-ENTMCNC: 31.1 G/DL (ref 31–36)
MCHC RBC AUTO-ENTMCNC: 31.3 G/DL (ref 31–36)
MCHC RBC AUTO-ENTMCNC: 32.2 G/DL (ref 31–36)
MCHC RBC AUTO-ENTMCNC: 32.2 G/DL (ref 31–36)
MCHC RBC AUTO-ENTMCNC: 32.3 G/DL (ref 31–36)
MCHC RBC AUTO-ENTMCNC: 32.4 G/DL (ref 31–36)
MCHC RBC AUTO-ENTMCNC: 32.5 G/DL (ref 31–36)
MCHC RBC AUTO-ENTMCNC: 32.5 G/DL (ref 31–36)
MCHC RBC AUTO-ENTMCNC: 32.6 G/DL (ref 31–36)
MCHC RBC AUTO-ENTMCNC: 32.9 G/DL (ref 31–36)
MCV RBC AUTO: 100.7 FL (ref 80–100)
MCV RBC AUTO: 100.9 FL (ref 80–100)
MCV RBC AUTO: 101.4 FL (ref 80–100)
MCV RBC AUTO: 101.7 FL (ref 80–100)
MCV RBC AUTO: 96.2 FL (ref 80–100)
MCV RBC AUTO: 97.3 FL (ref 80–100)
MCV RBC AUTO: 97.4 FL (ref 80–100)
MCV RBC AUTO: 97.4 FL (ref 80–100)
MCV RBC AUTO: 97.7 FL (ref 80–100)
MCV RBC AUTO: 97.8 FL (ref 80–100)
MCV RBC AUTO: 97.9 FL (ref 80–100)
MCV RBC AUTO: 98.1 FL (ref 80–100)
MCV RBC AUTO: 98.8 FL (ref 80–100)
MCV RBC AUTO: 99.5 FL (ref 80–100)
MCV RBC AUTO: 99.6 FL (ref 80–100)
MCV RBC AUTO: 99.6 FL (ref 80–100)
MCV RBC AUTO: 99.8 FL (ref 80–100)
MCV RBC AUTO: 99.9 FL (ref 80–100)
METHADONE SCREEN, URINE: NORMAL
METHADONE SCREEN, URINE: NORMAL
METHEMOGLOBIN ARTERIAL: 0.1 %
METHEMOGLOBIN ARTERIAL: 0.2 %
METHEMOGLOBIN ARTERIAL: 0.3 %
METHEMOGLOBIN ARTERIAL: 0.4 %
METHEMOGLOBIN ARTERIAL: 0.4 %
METHEMOGLOBIN ARTERIAL: 0.5 %
METHEMOGLOBIN ARTERIAL: 0.7 %
MICROSCOPIC EXAMINATION: NORMAL
MICROSCOPIC EXAMINATION: YES
MICROSCOPIC EXAMINATION: YES
MONOCYTES ABSOLUTE: 0.1 K/UL (ref 0–1.3)
MONOCYTES ABSOLUTE: 0.1 K/UL (ref 0–1.3)
MONOCYTES ABSOLUTE: 0.2 K/UL (ref 0–1.3)
MONOCYTES ABSOLUTE: 0.3 K/UL (ref 0–1.3)
MONOCYTES ABSOLUTE: 0.3 K/UL (ref 0–1.3)
MONOCYTES ABSOLUTE: 0.4 K/UL (ref 0–1.3)
MONOCYTES ABSOLUTE: 0.4 K/UL (ref 0–1.3)
MONOCYTES ABSOLUTE: 1.2 K/UL (ref 0–1.3)
MONOCYTES ABSOLUTE: 2.4 K/UL (ref 0–1.3)
MONOCYTES RELATIVE PERCENT: 2.4 %
MONOCYTES RELATIVE PERCENT: 3.1 %
MONOCYTES RELATIVE PERCENT: 3.5 %
MONOCYTES RELATIVE PERCENT: 3.6 %
MONOCYTES RELATIVE PERCENT: 4.2 %
MONOCYTES RELATIVE PERCENT: 4.3 %
MONOCYTES RELATIVE PERCENT: 4.8 %
MONOCYTES RELATIVE PERCENT: 5.5 %
MONOCYTES RELATIVE PERCENT: 5.8 %
MONOCYTES RELATIVE PERCENT: 6.1 %
MONOCYTES RELATIVE PERCENT: 6.6 %
MONOCYTES RELATIVE PERCENT: 7 %
MONOCYTES RELATIVE PERCENT: 7.1 %
MRSA CULTURE ONLY: NORMAL
NEUTROPHILS ABSOLUTE: 14.3 K/UL (ref 1.7–7.7)
NEUTROPHILS ABSOLUTE: 2.6 K/UL (ref 1.7–7.7)
NEUTROPHILS ABSOLUTE: 2.7 K/UL (ref 1.7–7.7)
NEUTROPHILS ABSOLUTE: 3.3 K/UL (ref 1.7–7.7)
NEUTROPHILS ABSOLUTE: 3.4 K/UL (ref 1.7–7.7)
NEUTROPHILS ABSOLUTE: 3.4 K/UL (ref 1.7–7.7)
NEUTROPHILS ABSOLUTE: 3.9 K/UL (ref 1.7–7.7)
NEUTROPHILS ABSOLUTE: 3.9 K/UL (ref 1.7–7.7)
NEUTROPHILS ABSOLUTE: 30.1 K/UL (ref 1.7–7.7)
NEUTROPHILS ABSOLUTE: 31.8 K/UL (ref 1.7–7.7)
NEUTROPHILS ABSOLUTE: 4.5 K/UL (ref 1.7–7.7)
NEUTROPHILS ABSOLUTE: 5.5 K/UL (ref 1.7–7.7)
NEUTROPHILS ABSOLUTE: 7.1 K/UL (ref 1.7–7.7)
NEUTROPHILS RELATIVE PERCENT: 78 %
NEUTROPHILS RELATIVE PERCENT: 84 %
NEUTROPHILS RELATIVE PERCENT: 86.3 %
NEUTROPHILS RELATIVE PERCENT: 86.6 %
NEUTROPHILS RELATIVE PERCENT: 86.7 %
NEUTROPHILS RELATIVE PERCENT: 89.7 %
NEUTROPHILS RELATIVE PERCENT: 89.8 %
NEUTROPHILS RELATIVE PERCENT: 90.7 %
NEUTROPHILS RELATIVE PERCENT: 90.9 %
NEUTROPHILS RELATIVE PERCENT: 92 %
NEUTROPHILS RELATIVE PERCENT: 92.3 %
NEUTROPHILS RELATIVE PERCENT: 93.2 %
NEUTROPHILS RELATIVE PERCENT: 95.6 %
NITRITE, URINE: NEGATIVE
O2 CONTENT ARTERIAL: 13 ML/DL
O2 CONTENT ARTERIAL: 14 ML/DL
O2 CONTENT ARTERIAL: 15 ML/DL
O2 CONTENT ARTERIAL: 16 ML/DL
O2 SAT, ARTERIAL: 100 %
O2 SAT, ARTERIAL: 100 %
O2 SAT, ARTERIAL: 94.7 %
O2 SAT, ARTERIAL: 95.2 %
O2 SAT, ARTERIAL: 95.4 %
O2 SAT, ARTERIAL: 96.3 %
O2 SAT, ARTERIAL: 96.8 %
O2 SAT, ARTERIAL: 98.1 %
O2 SAT, ARTERIAL: 98.5 %
O2 SAT, ARTERIAL: 98.6 %
O2 SAT, ARTERIAL: 99 % (ref 93–100)
O2 SAT, ARTERIAL: 99 % (ref 93–100)
O2 SAT, ARTERIAL: 99.4 %
O2 THERAPY: ABNORMAL
OCCULT BLOOD DIAGNOSTIC: ABNORMAL
OPIATE SCREEN URINE: NORMAL
OPIATE SCREEN URINE: NORMAL
ORGANISM: ABNORMAL
ORGANISM: ABNORMAL
OSMOLALITY URINE: 172 MOSM/KG (ref 390–1070)
OXYCODONE URINE: NORMAL
OXYCODONE URINE: NORMAL
PCO2 ARTERIAL: 35.1 MMHG (ref 35–45)
PCO2 ARTERIAL: 47.2 MMHG (ref 35–45)
PCO2 ARTERIAL: 61.4 MMHG (ref 35–45)
PCO2 ARTERIAL: 61.7 MM HG (ref 35–45)
PCO2 ARTERIAL: 65.5 MMHG (ref 35–45)
PCO2 ARTERIAL: 68.2 MMHG (ref 35–45)
PCO2 ARTERIAL: 68.2 MMHG (ref 35–45)
PCO2 ARTERIAL: 70 MMHG (ref 35–45)
PCO2 ARTERIAL: 75.4 MM HG (ref 35–45)
PCO2 ARTERIAL: 80.4 MMHG (ref 35–45)
PCO2 ARTERIAL: 85.2 MMHG (ref 35–45)
PCO2 ARTERIAL: 91.4 MMHG (ref 35–45)
PCO2 ARTERIAL: 97.7 MMHG (ref 35–45)
PDW BLD-RTO: 13.3 % (ref 12.4–15.4)
PDW BLD-RTO: 13.3 % (ref 12.4–15.4)
PDW BLD-RTO: 13.4 % (ref 12.4–15.4)
PDW BLD-RTO: 13.4 % (ref 12.4–15.4)
PDW BLD-RTO: 13.5 % (ref 12.4–15.4)
PDW BLD-RTO: 13.6 % (ref 12.4–15.4)
PDW BLD-RTO: 13.7 % (ref 12.4–15.4)
PDW BLD-RTO: 13.7 % (ref 12.4–15.4)
PDW BLD-RTO: 13.8 % (ref 12.4–15.4)
PDW BLD-RTO: 13.8 % (ref 12.4–15.4)
PDW BLD-RTO: 13.9 % (ref 12.4–15.4)
PDW BLD-RTO: 14 % (ref 12.4–15.4)
PDW BLD-RTO: 14 % (ref 12.4–15.4)
PDW BLD-RTO: 14.1 % (ref 12.4–15.4)
PDW BLD-RTO: 14.1 % (ref 12.4–15.4)
PDW BLD-RTO: 14.2 % (ref 12.4–15.4)
PDW BLD-RTO: 14.4 % (ref 12.4–15.4)
PDW BLD-RTO: 14.6 % (ref 12.4–15.4)
PERFORMED ON: ABNORMAL
PERFORMED ON: NORMAL
PH ARTERIAL: 7.19 (ref 7.35–7.45)
PH ARTERIAL: 7.23 (ref 7.35–7.45)
PH ARTERIAL: 7.32 (ref 7.35–7.45)
PH ARTERIAL: 7.32 (ref 7.35–7.45)
PH ARTERIAL: 7.33 (ref 7.35–7.45)
PH ARTERIAL: 7.38 (ref 7.35–7.45)
PH ARTERIAL: 7.38 (ref 7.35–7.45)
PH ARTERIAL: 7.44 (ref 7.35–7.45)
PH ARTERIAL: 7.47 (ref 7.35–7.45)
PH ARTERIAL: 7.47 (ref 7.35–7.45)
PH ARTERIAL: 7.51 (ref 7.35–7.45)
PH ARTERIAL: 7.59 (ref 7.35–7.45)
PH ARTERIAL: 7.61 (ref 7.35–7.45)
PH UA: 5.5
PH UA: 5.5 (ref 5–8)
PH UA: 6 (ref 5–8)
PH UA: 6.5
PH UA: 7 (ref 5–8)
PHENCYCLIDINE SCREEN URINE: NORMAL
PHENCYCLIDINE SCREEN URINE: NORMAL
PHOSPHORUS: 2.5 MG/DL (ref 2.5–4.9)
PLATELET # BLD: 126 K/UL (ref 135–450)
PLATELET # BLD: 127 K/UL (ref 135–450)
PLATELET # BLD: 137 K/UL (ref 135–450)
PLATELET # BLD: 146 K/UL (ref 135–450)
PLATELET # BLD: 147 K/UL (ref 135–450)
PLATELET # BLD: 148 K/UL (ref 135–450)
PLATELET # BLD: 149 K/UL (ref 135–450)
PLATELET # BLD: 151 K/UL (ref 135–450)
PLATELET # BLD: 152 K/UL (ref 135–450)
PLATELET # BLD: 158 K/UL (ref 135–450)
PLATELET # BLD: 159 K/UL (ref 135–450)
PLATELET # BLD: 160 K/UL (ref 135–450)
PLATELET # BLD: 160 K/UL (ref 135–450)
PLATELET # BLD: 164 K/UL (ref 135–450)
PLATELET # BLD: 185 K/UL (ref 135–450)
PLATELET # BLD: 190 K/UL (ref 135–450)
PLATELET # BLD: 221 K/UL (ref 135–450)
PLATELET # BLD: 258 K/UL (ref 135–450)
PLATELET # BLD: 281 K/UL (ref 135–450)
PLATELET # BLD: 284 K/UL (ref 135–450)
PLATELET SLIDE REVIEW: ADEQUATE
PMV BLD AUTO: 10 FL (ref 5–10.5)
PMV BLD AUTO: 10.4 FL (ref 5–10.5)
PMV BLD AUTO: 11.3 FL (ref 5–10.5)
PMV BLD AUTO: 8.8 FL (ref 5–10.5)
PMV BLD AUTO: 8.9 FL (ref 5–10.5)
PMV BLD AUTO: 9 FL (ref 5–10.5)
PMV BLD AUTO: 9.2 FL (ref 5–10.5)
PMV BLD AUTO: 9.3 FL (ref 5–10.5)
PMV BLD AUTO: 9.4 FL (ref 5–10.5)
PMV BLD AUTO: 9.5 FL (ref 5–10.5)
PMV BLD AUTO: 9.6 FL (ref 5–10.5)
PMV BLD AUTO: 9.7 FL (ref 5–10.5)
PMV BLD AUTO: 9.7 FL (ref 5–10.5)
PO2 ARTERIAL: 131 MMHG (ref 75–108)
PO2 ARTERIAL: 138 MMHG (ref 75–108)
PO2 ARTERIAL: 165.6 MM HG (ref 75–108)
PO2 ARTERIAL: 165.8 MM HG (ref 75–108)
PO2 ARTERIAL: 253 MMHG (ref 75–108)
PO2 ARTERIAL: 288 MMHG (ref 75–108)
PO2 ARTERIAL: 63.4 MMHG (ref 75–108)
PO2 ARTERIAL: 66.6 MMHG (ref 75–108)
PO2 ARTERIAL: 67.7 MMHG (ref 75–108)
PO2 ARTERIAL: 77.8 MMHG (ref 75–108)
PO2 ARTERIAL: 82.8 MMHG (ref 75–108)
PO2 ARTERIAL: 83.4 MMHG (ref 75–108)
PO2 ARTERIAL: 97.7 MMHG (ref 75–108)
POC SAMPLE TYPE: ABNORMAL
POC SAMPLE TYPE: ABNORMAL
POTASSIUM REFLEX MAGNESIUM: 2.4 MMOL/L (ref 3.5–5.1)
POTASSIUM REFLEX MAGNESIUM: 2.9 MMOL/L (ref 3.5–5.1)
POTASSIUM REFLEX MAGNESIUM: 3.2 MMOL/L (ref 3.5–5.1)
POTASSIUM REFLEX MAGNESIUM: 3.3 MMOL/L (ref 3.5–5.1)
POTASSIUM REFLEX MAGNESIUM: 3.5 MMOL/L (ref 3.5–5.1)
POTASSIUM REFLEX MAGNESIUM: 3.6 MMOL/L (ref 3.5–5.1)
POTASSIUM REFLEX MAGNESIUM: 3.6 MMOL/L (ref 3.5–5.1)
POTASSIUM REFLEX MAGNESIUM: 3.7 MMOL/L (ref 3.5–5.1)
POTASSIUM REFLEX MAGNESIUM: 3.7 MMOL/L (ref 3.5–5.1)
POTASSIUM REFLEX MAGNESIUM: 3.8 MMOL/L (ref 3.5–5.1)
POTASSIUM REFLEX MAGNESIUM: 3.8 MMOL/L (ref 3.5–5.1)
POTASSIUM REFLEX MAGNESIUM: 3.9 MMOL/L (ref 3.5–5.1)
POTASSIUM REFLEX MAGNESIUM: 4.1 MMOL/L (ref 3.5–5.1)
POTASSIUM REFLEX MAGNESIUM: 4.1 MMOL/L (ref 3.5–5.1)
POTASSIUM REFLEX MAGNESIUM: 4.3 MMOL/L (ref 3.5–5.1)
POTASSIUM REFLEX MAGNESIUM: 4.6 MMOL/L (ref 3.5–5.1)
POTASSIUM REFLEX MAGNESIUM: 5.1 MMOL/L (ref 3.5–5.1)
POTASSIUM SERPL-SCNC: 3.1 MMOL/L (ref 3.5–5.1)
POTASSIUM SERPL-SCNC: 3.6 MMOL/L (ref 3.5–5.1)
POTASSIUM SERPL-SCNC: 3.7 MMOL/L (ref 3.5–5.1)
POTASSIUM SERPL-SCNC: 3.7 MMOL/L (ref 3.5–5.1)
POTASSIUM SERPL-SCNC: 3.9 MMOL/L (ref 3.5–5.1)
POTASSIUM SERPL-SCNC: 4.2 MMOL/L (ref 3.5–5.1)
POTASSIUM SERPL-SCNC: 5.9 MMOL/L (ref 3.5–5.1)
PRO-BNP: 280 PG/ML (ref 0–124)
PRO-BNP: 449 PG/ML (ref 0–124)
PRO-BNP: 778 PG/ML (ref 0–124)
PROCALCITONIN: 0.05 NG/ML (ref 0–0.15)
PROCALCITONIN: 0.05 NG/ML (ref 0–0.15)
PROCALCITONIN: 0.06 NG/ML (ref 0–0.15)
PROCALCITONIN: 0.06 NG/ML (ref 0–0.15)
PROCALCITONIN: 0.07 NG/ML (ref 0–0.15)
PROCALCITONIN: 0.13 NG/ML (ref 0–0.15)
PROCALCITONIN: 2.59 NG/ML (ref 0–0.15)
PROPOXYPHENE SCREEN: NORMAL
PROPOXYPHENE SCREEN: NORMAL
PROTEIN UA: 30 MG/DL
PROTEIN UA: 30 MG/DL
PROTEIN UA: NEGATIVE MG/DL
PROTHROMBIN TIME: 11.2 SEC (ref 10–13.2)
PROTHROMBIN TIME: 14.1 SEC (ref 10–13.2)
PROTHROMBIN TIME: 14.5 SEC (ref 10–13.2)
PROTHROMBIN TIME: 18.6 SEC (ref 10–13.2)
RBC # BLD: 1.69 M/UL (ref 4–5.2)
RBC # BLD: 2.84 M/UL (ref 4–5.2)
RBC # BLD: 3 M/UL (ref 4–5.2)
RBC # BLD: 3.08 M/UL (ref 4–5.2)
RBC # BLD: 3.12 M/UL (ref 4–5.2)
RBC # BLD: 3.15 M/UL (ref 4–5.2)
RBC # BLD: 3.17 M/UL (ref 4–5.2)
RBC # BLD: 3.18 M/UL (ref 4–5.2)
RBC # BLD: 3.21 M/UL (ref 4–5.2)
RBC # BLD: 3.23 M/UL (ref 4–5.2)
RBC # BLD: 3.24 M/UL (ref 4–5.2)
RBC # BLD: 3.32 M/UL (ref 4–5.2)
RBC # BLD: 3.35 M/UL (ref 4–5.2)
RBC # BLD: 3.37 M/UL (ref 4–5.2)
RBC # BLD: 3.38 M/UL (ref 4–5.2)
RBC # BLD: 3.46 M/UL (ref 4–5.2)
RBC # BLD: 3.5 M/UL (ref 4–5.2)
RBC # BLD: 3.54 M/UL (ref 4–5.2)
RBC # BLD: 3.75 M/UL (ref 4–5.2)
RBC # BLD: 4.03 M/UL (ref 4–5.2)
RBC UA: 7 /HPF (ref 0–4)
RBC UA: ABNORMAL /HPF (ref 0–4)
REASON FOR REJECTION: NORMAL
REJECTED TEST: NORMAL
SALICYLATE, SERUM: <0.3 MG/DL (ref 15–30)
SALICYLATE, SERUM: <0.3 MG/DL (ref 15–30)
SARS-COV-2, PCR: DETECTED
SARS-COV-2, PCR: NOT DETECTED
SLIDE REVIEW: ABNORMAL
SMUDGE CELLS: PRESENT
SODIUM BLD-SCNC: 133 MMOL/L (ref 136–145)
SODIUM BLD-SCNC: 135 MMOL/L (ref 136–145)
SODIUM BLD-SCNC: 138 MMOL/L (ref 136–145)
SODIUM BLD-SCNC: 139 MMOL/L (ref 136–145)
SODIUM BLD-SCNC: 140 MMOL/L (ref 136–145)
SODIUM BLD-SCNC: 140 MMOL/L (ref 136–145)
SODIUM BLD-SCNC: 141 MMOL/L (ref 136–145)
SODIUM BLD-SCNC: 142 MMOL/L (ref 136–145)
SODIUM BLD-SCNC: 142 MMOL/L (ref 136–145)
SODIUM BLD-SCNC: 143 MMOL/L (ref 136–145)
SODIUM BLD-SCNC: 143 MMOL/L (ref 136–145)
SODIUM BLD-SCNC: 145 MMOL/L (ref 136–145)
SODIUM BLD-SCNC: 146 MMOL/L (ref 136–145)
SODIUM BLD-SCNC: 147 MMOL/L (ref 136–145)
SODIUM BLD-SCNC: 147 MMOL/L (ref 136–145)
SODIUM BLD-SCNC: 149 MMOL/L (ref 136–145)
SODIUM BLD-SCNC: 149 MMOL/L (ref 136–145)
SODIUM BLD-SCNC: 150 MMOL/L (ref 136–145)
SODIUM BLD-SCNC: 151 MMOL/L (ref 136–145)
SODIUM BLD-SCNC: 152 MMOL/L (ref 136–145)
SODIUM URINE: 54 MMOL/L
SPECIFIC GRAVITY UA: 1.01 (ref 1–1.03)
SPECIFIC GRAVITY UA: 1.01 (ref 1–1.03)
SPECIFIC GRAVITY UA: 1.02 (ref 1–1.03)
SPECIFIC GRAVITY UA: >=1.03 (ref 1–1.03)
STREP PNEUMONIAE ANTIGEN, URINE: NORMAL
TCO2 ARTERIAL: 108.4 MMOL/L
TCO2 ARTERIAL: 108.6 MMOL/L
TCO2 ARTERIAL: 110.4 MMOL/L
TCO2 ARTERIAL: 112.5 MMOL/L
TCO2 ARTERIAL: 113 MMOL/L
TCO2 ARTERIAL: 116.8 MMOL/L
TCO2 ARTERIAL: 119.5 MMOL/L
TCO2 ARTERIAL: 34 MMOL/L
TCO2 ARTERIAL: 35 MMOL/L
TCO2 ARTERIAL: 78.4 MMOL/L
TCO2 ARTERIAL: 90.5 MMOL/L
TCO2 ARTERIAL: 97.5 MMOL/L
TCO2 ARTERIAL: 98.6 MMOL/L
TOTAL CK: 25 U/L (ref 26–192)
TOTAL IRON BINDING CAPACITY: 293 UG/DL (ref 260–445)
TOTAL PROTEIN: 3.7 G/DL (ref 6.4–8.2)
TOTAL PROTEIN: 4.4 G/DL (ref 6.4–8.2)
TOTAL PROTEIN: 4.5 G/DL (ref 6.4–8.2)
TOTAL PROTEIN: 4.8 G/DL (ref 6.4–8.2)
TOTAL PROTEIN: 4.8 G/DL (ref 6.4–8.2)
TOTAL PROTEIN: 4.9 G/DL (ref 6.4–8.2)
TOTAL PROTEIN: 5.1 G/DL (ref 6.4–8.2)
TOTAL PROTEIN: 5.2 G/DL (ref 6.4–8.2)
TOTAL PROTEIN: 5.2 G/DL (ref 6.4–8.2)
TOTAL PROTEIN: 6.4 G/DL (ref 6.4–8.2)
TOXIC GRANULATION: PRESENT
TROPONIN: <0.01 NG/ML
TSH SERPL DL<=0.05 MIU/L-ACNC: 0.89 UIU/ML (ref 0.27–4.2)
TSH SERPL DL<=0.05 MIU/L-ACNC: 1.14 UIU/ML (ref 0.27–4.2)
URINE CULTURE, ROUTINE: ABNORMAL
URINE REFLEX TO CULTURE: ABNORMAL
URINE REFLEX TO CULTURE: NORMAL
URINE REFLEX TO CULTURE: YES
URINE TYPE: ABNORMAL
URINE TYPE: ABNORMAL
URINE TYPE: NORMAL
UROBILINOGEN, URINE: 0.2 E.U./DL
UROBILINOGEN, URINE: 4 E.U./DL
UROBILINOGEN, URINE: 4 E.U./DL
VACUOLATED NEUTROPHILS: PRESENT
VANCOMYCIN TROUGH: 6.6 UG/ML (ref 10–20)
VITAMIN B-12: 1270 PG/ML (ref 211–911)
VITAMIN B-12: 992 PG/ML (ref 211–911)
VITAMIN B1, PLASMA: 19 NMOL/L (ref 4–15)
VITAMIN B6: 15 NMOL/L (ref 20–125)
WBC # BLD: 15 K/UL (ref 4–11)
WBC # BLD: 3.2 K/UL (ref 4–11)
WBC # BLD: 3.4 K/UL (ref 4–11)
WBC # BLD: 3.5 K/UL (ref 4–11)
WBC # BLD: 3.8 K/UL (ref 4–11)
WBC # BLD: 3.9 K/UL (ref 4–11)
WBC # BLD: 34.1 K/UL (ref 4–11)
WBC # BLD: 34.6 K/UL (ref 4–11)
WBC # BLD: 4.2 K/UL (ref 4–11)
WBC # BLD: 4.3 K/UL (ref 4–11)
WBC # BLD: 4.6 K/UL (ref 4–11)
WBC # BLD: 4.7 K/UL (ref 4–11)
WBC # BLD: 5 K/UL (ref 4–11)
WBC # BLD: 5 K/UL (ref 4–11)
WBC # BLD: 5.2 K/UL (ref 4–11)
WBC # BLD: 5.5 K/UL (ref 4–11)
WBC # BLD: 5.6 K/UL (ref 4–11)
WBC # BLD: 6.4 K/UL (ref 4–11)
WBC # BLD: 7.4 K/UL (ref 4–11)
WBC # BLD: 7.8 K/UL (ref 4–11)
WBC UA: 3 /HPF (ref 0–5)
WBC UA: ABNORMAL /HPF (ref 0–5)
WOUND/ABSCESS: ABNORMAL
WOUND/ABSCESS: ABNORMAL

## 2020-01-01 PROCEDURE — 2500000003 HC RX 250 WO HCPCS: Performed by: INTERNAL MEDICINE

## 2020-01-01 PROCEDURE — 83605 ASSAY OF LACTIC ACID: CPT

## 2020-01-01 PROCEDURE — 2580000003 HC RX 258: Performed by: EMERGENCY MEDICINE

## 2020-01-01 PROCEDURE — 6360000002 HC RX W HCPCS: Performed by: INTERNAL MEDICINE

## 2020-01-01 PROCEDURE — 2580000003 HC RX 258: Performed by: INTERNAL MEDICINE

## 2020-01-01 PROCEDURE — 72131 CT LUMBAR SPINE W/O DYE: CPT

## 2020-01-01 PROCEDURE — 85025 COMPLETE CBC W/AUTO DIFF WBC: CPT

## 2020-01-01 PROCEDURE — 6360000002 HC RX W HCPCS: Performed by: HOSPITALIST

## 2020-01-01 PROCEDURE — 6370000000 HC RX 637 (ALT 250 FOR IP): Performed by: INTERNAL MEDICINE

## 2020-01-01 PROCEDURE — 97140 MANUAL THERAPY 1/> REGIONS: CPT

## 2020-01-01 PROCEDURE — APPSS15 APP SPLIT SHARED TIME 0-15 MINUTES: Performed by: NURSE PRACTITIONER

## 2020-01-01 PROCEDURE — 71045 X-RAY EXAM CHEST 1 VIEW: CPT

## 2020-01-01 PROCEDURE — 11046 DBRDMT MUSC&/FSCA EA ADDL: CPT | Performed by: SURGERY

## 2020-01-01 PROCEDURE — 93010 ELECTROCARDIOGRAM REPORT: CPT | Performed by: INTERNAL MEDICINE

## 2020-01-01 PROCEDURE — 82803 BLOOD GASES ANY COMBINATION: CPT

## 2020-01-01 PROCEDURE — 1200000000 HC SEMI PRIVATE

## 2020-01-01 PROCEDURE — 84145 PROCALCITONIN (PCT): CPT

## 2020-01-01 PROCEDURE — 80053 COMPREHEN METABOLIC PANEL: CPT

## 2020-01-01 PROCEDURE — 81001 URINALYSIS AUTO W/SCOPE: CPT

## 2020-01-01 PROCEDURE — 99233 SBSQ HOSP IP/OBS HIGH 50: CPT | Performed by: INTERNAL MEDICINE

## 2020-01-01 PROCEDURE — 80048 BASIC METABOLIC PNL TOTAL CA: CPT

## 2020-01-01 PROCEDURE — 2000000000 HC ICU R&B

## 2020-01-01 PROCEDURE — 6360000002 HC RX W HCPCS: Performed by: STUDENT IN AN ORGANIZED HEALTH CARE EDUCATION/TRAINING PROGRAM

## 2020-01-01 PROCEDURE — 94640 AIRWAY INHALATION TREATMENT: CPT

## 2020-01-01 PROCEDURE — 96375 TX/PRO/DX INJ NEW DRUG ADDON: CPT

## 2020-01-01 PROCEDURE — 70551 MRI BRAIN STEM W/O DYE: CPT

## 2020-01-01 PROCEDURE — 85379 FIBRIN DEGRADATION QUANT: CPT

## 2020-01-01 PROCEDURE — 94761 N-INVAS EAR/PLS OXIMETRY MLT: CPT

## 2020-01-01 PROCEDURE — 2700000000 HC OXYGEN THERAPY PER DAY

## 2020-01-01 PROCEDURE — 85610 PROTHROMBIN TIME: CPT

## 2020-01-01 PROCEDURE — 94750 HC PULMONARY COMPLIANCE STUDY: CPT

## 2020-01-01 PROCEDURE — 87205 SMEAR GRAM STAIN: CPT

## 2020-01-01 PROCEDURE — 87040 BLOOD CULTURE FOR BACTERIA: CPT

## 2020-01-01 PROCEDURE — 83550 IRON BINDING TEST: CPT

## 2020-01-01 PROCEDURE — 94003 VENT MGMT INPAT SUBQ DAY: CPT

## 2020-01-01 PROCEDURE — 36600 WITHDRAWAL OF ARTERIAL BLOOD: CPT

## 2020-01-01 PROCEDURE — 86140 C-REACTIVE PROTEIN: CPT

## 2020-01-01 PROCEDURE — G0480 DRUG TEST DEF 1-7 CLASSES: HCPCS

## 2020-01-01 PROCEDURE — 6360000002 HC RX W HCPCS: Performed by: PHYSICIAN ASSISTANT

## 2020-01-01 PROCEDURE — 93005 ELECTROCARDIOGRAM TRACING: CPT | Performed by: INTERNAL MEDICINE

## 2020-01-01 PROCEDURE — 85384 FIBRINOGEN ACTIVITY: CPT

## 2020-01-01 PROCEDURE — 84295 ASSAY OF SERUM SODIUM: CPT

## 2020-01-01 PROCEDURE — 0KBN0ZZ EXCISION OF RIGHT HIP MUSCLE, OPEN APPROACH: ICD-10-PCS | Performed by: SURGERY

## 2020-01-01 PROCEDURE — U0003 INFECTIOUS AGENT DETECTION BY NUCLEIC ACID (DNA OR RNA); SEVERE ACUTE RESPIRATORY SYNDROME CORONAVIRUS 2 (SARS-COV-2) (CORONAVIRUS DISEASE [COVID-19]), AMPLIFIED PROBE TECHNIQUE, MAKING USE OF HIGH THROUGHPUT TECHNOLOGIES AS DESCRIBED BY CMS-2020-01-R: HCPCS

## 2020-01-01 PROCEDURE — 82140 ASSAY OF AMMONIA: CPT

## 2020-01-01 PROCEDURE — 94660 CPAP INITIATION&MGMT: CPT

## 2020-01-01 PROCEDURE — 82607 VITAMIN B-12: CPT

## 2020-01-01 PROCEDURE — 85027 COMPLETE CBC AUTOMATED: CPT

## 2020-01-01 PROCEDURE — 6370000000 HC RX 637 (ALT 250 FOR IP): Performed by: NURSE PRACTITIONER

## 2020-01-01 PROCEDURE — 93005 ELECTROCARDIOGRAM TRACING: CPT | Performed by: NURSE PRACTITIONER

## 2020-01-01 PROCEDURE — 6370000000 HC RX 637 (ALT 250 FOR IP): Performed by: SURGERY

## 2020-01-01 PROCEDURE — 94770 HC ETCO2 MONITOR DAILY: CPT

## 2020-01-01 PROCEDURE — 85730 THROMBOPLASTIN TIME PARTIAL: CPT

## 2020-01-01 PROCEDURE — 36415 COLL VENOUS BLD VENIPUNCTURE: CPT

## 2020-01-01 PROCEDURE — 84132 ASSAY OF SERUM POTASSIUM: CPT

## 2020-01-01 PROCEDURE — APPNB30 APP NON BILLABLE TIME 0-30 MINS: Performed by: NURSE PRACTITIONER

## 2020-01-01 PROCEDURE — 99223 1ST HOSP IP/OBS HIGH 75: CPT | Performed by: INTERNAL MEDICINE

## 2020-01-01 PROCEDURE — 6360000004 HC RX CONTRAST MEDICATION: Performed by: PHYSICIAN ASSISTANT

## 2020-01-01 PROCEDURE — 2500000003 HC RX 250 WO HCPCS

## 2020-01-01 PROCEDURE — 80307 DRUG TEST PRSMV CHEM ANLYZR: CPT

## 2020-01-01 PROCEDURE — 96366 THER/PROPH/DIAG IV INF ADDON: CPT

## 2020-01-01 PROCEDURE — 2580000003 HC RX 258: Performed by: STUDENT IN AN ORGANIZED HEALTH CARE EDUCATION/TRAINING PROGRAM

## 2020-01-01 PROCEDURE — 97162 PT EVAL MOD COMPLEX 30 MIN: CPT

## 2020-01-01 PROCEDURE — 84484 ASSAY OF TROPONIN QUANT: CPT

## 2020-01-01 PROCEDURE — 86850 RBC ANTIBODY SCREEN: CPT

## 2020-01-01 PROCEDURE — 0JB70ZZ EXCISION OF BACK SUBCUTANEOUS TISSUE AND FASCIA, OPEN APPROACH: ICD-10-PCS | Performed by: INTERNAL MEDICINE

## 2020-01-01 PROCEDURE — 31500 INSERT EMERGENCY AIRWAY: CPT

## 2020-01-01 PROCEDURE — 6360000002 HC RX W HCPCS: Performed by: NURSE PRACTITIONER

## 2020-01-01 PROCEDURE — 99291 CRITICAL CARE FIRST HOUR: CPT | Performed by: INTERNAL MEDICINE

## 2020-01-01 PROCEDURE — 99285 EMERGENCY DEPT VISIT HI MDM: CPT

## 2020-01-01 PROCEDURE — 2580000003 HC RX 258: Performed by: NURSE PRACTITIONER

## 2020-01-01 PROCEDURE — 2500000003 HC RX 250 WO HCPCS: Performed by: NURSE PRACTITIONER

## 2020-01-01 PROCEDURE — 83880 ASSAY OF NATRIURETIC PEPTIDE: CPT

## 2020-01-01 PROCEDURE — 2580000003 HC RX 258: Performed by: HOSPITALIST

## 2020-01-01 PROCEDURE — 97535 SELF CARE MNGMENT TRAINING: CPT

## 2020-01-01 PROCEDURE — 71260 CT THORAX DX C+: CPT

## 2020-01-01 PROCEDURE — 82746 ASSAY OF FOLIC ACID SERUM: CPT

## 2020-01-01 PROCEDURE — 83690 ASSAY OF LIPASE: CPT

## 2020-01-01 PROCEDURE — 02HV33Z INSERTION OF INFUSION DEVICE INTO SUPERIOR VENA CAVA, PERCUTANEOUS APPROACH: ICD-10-PCS | Performed by: INTERNAL MEDICINE

## 2020-01-01 PROCEDURE — 84300 ASSAY OF URINE SODIUM: CPT

## 2020-01-01 PROCEDURE — 11045 DBRDMT SUBQ TISS EACH ADDL: CPT | Performed by: SURGERY

## 2020-01-01 PROCEDURE — APPSS30 APP SPLIT SHARED TIME 16-30 MINUTES: Performed by: NURSE PRACTITIONER

## 2020-01-01 PROCEDURE — 82728 ASSAY OF FERRITIN: CPT

## 2020-01-01 PROCEDURE — 86900 BLOOD TYPING SEROLOGIC ABO: CPT

## 2020-01-01 PROCEDURE — 97530 THERAPEUTIC ACTIVITIES: CPT

## 2020-01-01 PROCEDURE — 86901 BLOOD TYPING SEROLOGIC RH(D): CPT

## 2020-01-01 PROCEDURE — 83615 LACTATE (LD) (LDH) ENZYME: CPT

## 2020-01-01 PROCEDURE — 87081 CULTURE SCREEN ONLY: CPT

## 2020-01-01 PROCEDURE — 92526 ORAL FUNCTION THERAPY: CPT

## 2020-01-01 PROCEDURE — 84443 ASSAY THYROID STIM HORMONE: CPT

## 2020-01-01 PROCEDURE — 36591 DRAW BLOOD OFF VENOUS DEVICE: CPT

## 2020-01-01 PROCEDURE — XW033E5 INTRODUCTION OF REMDESIVIR ANTI-INFECTIVE INTO PERIPHERAL VEIN, PERCUTANEOUS APPROACH, NEW TECHNOLOGY GROUP 5: ICD-10-PCS | Performed by: INTERNAL MEDICINE

## 2020-01-01 PROCEDURE — 72192 CT PELVIS W/O DYE: CPT

## 2020-01-01 PROCEDURE — 99232 SBSQ HOSP IP/OBS MODERATE 35: CPT | Performed by: INTERNAL MEDICINE

## 2020-01-01 PROCEDURE — 70450 CT HEAD/BRAIN W/O DYE: CPT

## 2020-01-01 PROCEDURE — 6360000002 HC RX W HCPCS

## 2020-01-01 PROCEDURE — 6370000000 HC RX 637 (ALT 250 FOR IP): Performed by: PHYSICIAN ASSISTANT

## 2020-01-01 PROCEDURE — 36620 INSERTION CATHETER ARTERY: CPT | Performed by: INTERNAL MEDICINE

## 2020-01-01 PROCEDURE — XW13325 TRANSFUSION OF CONVALESCENT PLASMA (NONAUTOLOGOUS) INTO PERIPHERAL VEIN, PERCUTANEOUS APPROACH, NEW TECHNOLOGY GROUP 5: ICD-10-PCS | Performed by: INTERNAL MEDICINE

## 2020-01-01 PROCEDURE — 85014 HEMATOCRIT: CPT

## 2020-01-01 PROCEDURE — 2500000003 HC RX 250 WO HCPCS: Performed by: HOSPITALIST

## 2020-01-01 PROCEDURE — 83735 ASSAY OF MAGNESIUM: CPT

## 2020-01-01 PROCEDURE — 27786 TREATMENT OF ANKLE FRACTURE: CPT | Performed by: ORTHOPAEDIC SURGERY

## 2020-01-01 PROCEDURE — 83540 ASSAY OF IRON: CPT

## 2020-01-01 PROCEDURE — 83935 ASSAY OF URINE OSMOLALITY: CPT

## 2020-01-01 PROCEDURE — 84703 CHORIONIC GONADOTROPIN ASSAY: CPT

## 2020-01-01 PROCEDURE — 27750 TREATMENT OF TIBIA FRACTURE: CPT | Performed by: ORTHOPAEDIC SURGERY

## 2020-01-01 PROCEDURE — 97166 OT EVAL MOD COMPLEX 45 MIN: CPT

## 2020-01-01 PROCEDURE — 0KBP0ZZ EXCISION OF LEFT HIP MUSCLE, OPEN APPROACH: ICD-10-PCS | Performed by: SURGERY

## 2020-01-01 PROCEDURE — 87186 SC STD MICRODIL/AGAR DIL: CPT

## 2020-01-01 PROCEDURE — 2500000003 HC RX 250 WO HCPCS: Performed by: STUDENT IN AN ORGANIZED HEALTH CARE EDUCATION/TRAINING PROGRAM

## 2020-01-01 PROCEDURE — 97167 OT EVAL HIGH COMPLEX 60 MIN: CPT

## 2020-01-01 PROCEDURE — 93005 ELECTROCARDIOGRAM TRACING: CPT | Performed by: PHYSICIAN ASSISTANT

## 2020-01-01 PROCEDURE — 86403 PARTICLE AGGLUT ANTBDY SCRN: CPT

## 2020-01-01 PROCEDURE — 81003 URINALYSIS AUTO W/O SCOPE: CPT

## 2020-01-01 PROCEDURE — 92610 EVALUATE SWALLOWING FUNCTION: CPT

## 2020-01-01 PROCEDURE — 87086 URINE CULTURE/COLONY COUNT: CPT

## 2020-01-01 PROCEDURE — 6360000002 HC RX W HCPCS: Performed by: EMERGENCY MEDICINE

## 2020-01-01 PROCEDURE — 85018 HEMOGLOBIN: CPT

## 2020-01-01 PROCEDURE — 0BH17EZ INSERTION OF ENDOTRACHEAL AIRWAY INTO TRACHEA, VIA NATURAL OR ARTIFICIAL OPENING: ICD-10-PCS | Performed by: INTERNAL MEDICINE

## 2020-01-01 PROCEDURE — 99222 1ST HOSP IP/OBS MODERATE 55: CPT | Performed by: ORTHOPAEDIC SURGERY

## 2020-01-01 PROCEDURE — 99284 EMERGENCY DEPT VISIT MOD MDM: CPT

## 2020-01-01 PROCEDURE — 96374 THER/PROPH/DIAG INJ IV PUSH: CPT

## 2020-01-01 PROCEDURE — 93306 TTE W/DOPPLER COMPLETE: CPT

## 2020-01-01 PROCEDURE — 99254 IP/OBS CNSLTJ NEW/EST MOD 60: CPT | Performed by: PSYCHIATRY & NEUROLOGY

## 2020-01-01 PROCEDURE — 5A1935Z RESPIRATORY VENTILATION, LESS THAN 24 CONSECUTIVE HOURS: ICD-10-PCS | Performed by: INTERNAL MEDICINE

## 2020-01-01 PROCEDURE — G0328 FECAL BLOOD SCRN IMMUNOASSAY: HCPCS

## 2020-01-01 PROCEDURE — 51701 INSERT BLADDER CATHETER: CPT

## 2020-01-01 PROCEDURE — 0BH18EZ INSERTION OF ENDOTRACHEAL AIRWAY INTO TRACHEA, VIA NATURAL OR ARTIFICIAL OPENING ENDOSCOPIC: ICD-10-PCS | Performed by: INTERNAL MEDICINE

## 2020-01-01 PROCEDURE — 82525 ASSAY OF COPPER: CPT

## 2020-01-01 PROCEDURE — 84425 ASSAY OF VITAMIN B-1: CPT

## 2020-01-01 PROCEDURE — 80202 ASSAY OF VANCOMYCIN: CPT

## 2020-01-01 PROCEDURE — 82550 ASSAY OF CK (CPK): CPT

## 2020-01-01 PROCEDURE — 96367 TX/PROPH/DG ADDL SEQ IV INF: CPT

## 2020-01-01 PROCEDURE — 87449 NOS EACH ORGANISM AG IA: CPT

## 2020-01-01 PROCEDURE — 2580000003 HC RX 258: Performed by: PHYSICIAN ASSISTANT

## 2020-01-01 PROCEDURE — 87070 CULTURE OTHR SPECIMN AEROBIC: CPT

## 2020-01-01 PROCEDURE — 96365 THER/PROPH/DIAG IV INF INIT: CPT

## 2020-01-01 PROCEDURE — 36556 INSERT NON-TUNNEL CV CATH: CPT | Performed by: INTERNAL MEDICINE

## 2020-01-01 PROCEDURE — 94760 N-INVAS EAR/PLS OXIMETRY 1: CPT

## 2020-01-01 PROCEDURE — U0002 COVID-19 LAB TEST NON-CDC: HCPCS

## 2020-01-01 PROCEDURE — 51702 INSERT TEMP BLADDER CATH: CPT

## 2020-01-01 PROCEDURE — 11042 DBRDMT SUBQ TIS 1ST 20SQCM/<: CPT | Performed by: SURGERY

## 2020-01-01 PROCEDURE — 87077 CULTURE AEROBIC IDENTIFY: CPT

## 2020-01-01 PROCEDURE — 73610 X-RAY EXAM OF ANKLE: CPT

## 2020-01-01 PROCEDURE — 6370000000 HC RX 637 (ALT 250 FOR IP)

## 2020-01-01 PROCEDURE — 94002 VENT MGMT INPAT INIT DAY: CPT

## 2020-01-01 PROCEDURE — 84207 ASSAY OF VITAMIN B-6: CPT

## 2020-01-01 PROCEDURE — 93005 ELECTROCARDIOGRAM TRACING: CPT | Performed by: EMERGENCY MEDICINE

## 2020-01-01 PROCEDURE — 99292 CRITICAL CARE ADDL 30 MIN: CPT | Performed by: INTERNAL MEDICINE

## 2020-01-01 PROCEDURE — 5A1945Z RESPIRATORY VENTILATION, 24-96 CONSECUTIVE HOURS: ICD-10-PCS | Performed by: INTERNAL MEDICINE

## 2020-01-01 PROCEDURE — 11043 DBRDMT MUSC&/FSCA 1ST 20/<: CPT | Performed by: SURGERY

## 2020-01-01 RX ORDER — LEVOFLOXACIN 5 MG/ML
750 INJECTION, SOLUTION INTRAVENOUS ONCE
Status: COMPLETED | OUTPATIENT
Start: 2020-01-01 | End: 2020-01-01

## 2020-01-01 RX ORDER — PANTOPRAZOLE SODIUM 40 MG/1
40 TABLET, DELAYED RELEASE ORAL DAILY
Status: DISCONTINUED | OUTPATIENT
Start: 2020-01-01 | End: 2020-01-01

## 2020-01-01 RX ORDER — LIDOCAINE HYDROCHLORIDE AND EPINEPHRINE 10; 10 MG/ML; UG/ML
20 INJECTION, SOLUTION INFILTRATION; PERINEURAL ONCE
Status: COMPLETED | OUTPATIENT
Start: 2020-01-01 | End: 2020-01-01

## 2020-01-01 RX ORDER — BUPRENORPHINE AND NALOXONE 8; 2 MG/1; MG/1
1 FILM, SOLUBLE BUCCAL; SUBLINGUAL EVERY MORNING
Status: DISCONTINUED | OUTPATIENT
Start: 2020-01-01 | End: 2020-01-01 | Stop reason: DRUGHIGH

## 2020-01-01 RX ORDER — CHOLECALCIFEROL (VITAMIN D3) 125 MCG
500 CAPSULE ORAL DAILY
COMMUNITY

## 2020-01-01 RX ORDER — POTASSIUM CHLORIDE 29.8 MG/ML
20 INJECTION INTRAVENOUS
Status: COMPLETED | OUTPATIENT
Start: 2020-01-01 | End: 2020-01-01

## 2020-01-01 RX ORDER — PROMETHAZINE HYDROCHLORIDE 25 MG/1
25 TABLET ORAL EVERY 12 HOURS
COMMUNITY

## 2020-01-01 RX ORDER — IPRATROPIUM BROMIDE AND ALBUTEROL SULFATE 2.5; .5 MG/3ML; MG/3ML
1 SOLUTION RESPIRATORY (INHALATION) ONCE
Status: COMPLETED | OUTPATIENT
Start: 2020-01-01 | End: 2020-01-01

## 2020-01-01 RX ORDER — LORAZEPAM 2 MG/ML
1 INJECTION INTRAMUSCULAR EVERY 4 HOURS PRN
Status: DISCONTINUED | OUTPATIENT
Start: 2020-01-01 | End: 2020-01-01 | Stop reason: HOSPADM

## 2020-01-01 RX ORDER — DEXMEDETOMIDINE HYDROCHLORIDE 4 UG/ML
0.2 INJECTION, SOLUTION INTRAVENOUS CONTINUOUS
Status: DISCONTINUED | OUTPATIENT
Start: 2020-01-01 | End: 2020-01-01

## 2020-01-01 RX ORDER — 0.9 % SODIUM CHLORIDE 0.9 %
1000 INTRAVENOUS SOLUTION INTRAVENOUS ONCE
Status: COMPLETED | OUTPATIENT
Start: 2020-01-01 | End: 2020-01-01

## 2020-01-01 RX ORDER — IBUPROFEN 800 MG/1
800 TABLET ORAL
Status: DISCONTINUED | OUTPATIENT
Start: 2020-01-01 | End: 2020-01-01

## 2020-01-01 RX ORDER — MAGNESIUM SULFATE IN WATER 40 MG/ML
2 INJECTION, SOLUTION INTRAVENOUS ONCE
Status: COMPLETED | OUTPATIENT
Start: 2020-01-01 | End: 2020-01-01

## 2020-01-01 RX ORDER — POTASSIUM CHLORIDE 7.45 MG/ML
10 INJECTION INTRAVENOUS PRN
Status: DISCONTINUED | OUTPATIENT
Start: 2020-01-01 | End: 2020-01-01 | Stop reason: HOSPADM

## 2020-01-01 RX ORDER — PANTOPRAZOLE SODIUM 40 MG/10ML
40 INJECTION, POWDER, LYOPHILIZED, FOR SOLUTION INTRAVENOUS DAILY
Status: DISCONTINUED | OUTPATIENT
Start: 2020-11-20 | End: 2020-01-01

## 2020-01-01 RX ORDER — 0.9 % SODIUM CHLORIDE 0.9 %
20 INTRAVENOUS SOLUTION INTRAVENOUS ONCE
Status: DISCONTINUED | OUTPATIENT
Start: 2020-01-01 | End: 2020-01-01 | Stop reason: HOSPADM

## 2020-01-01 RX ORDER — LANOLIN ALCOHOL/MO/W.PET/CERES
500 CREAM (GRAM) TOPICAL DAILY
Status: DISCONTINUED | OUTPATIENT
Start: 2020-01-01 | End: 2020-01-01

## 2020-01-01 RX ORDER — 0.9 % SODIUM CHLORIDE 0.9 %
1000 INTRAVENOUS SOLUTION INTRAVENOUS ONCE
Status: DISCONTINUED | OUTPATIENT
Start: 2020-01-01 | End: 2020-01-01 | Stop reason: HOSPADM

## 2020-01-01 RX ORDER — FOLIC ACID 1 MG/1
1 TABLET ORAL DAILY
Status: DISCONTINUED | OUTPATIENT
Start: 2020-01-01 | End: 2020-01-01 | Stop reason: HOSPADM

## 2020-01-01 RX ORDER — QUETIAPINE FUMARATE 25 MG/1
25 TABLET, FILM COATED ORAL NIGHTLY
Status: DISCONTINUED | OUTPATIENT
Start: 2020-01-01 | End: 2020-01-01

## 2020-01-01 RX ORDER — BUPRENORPHINE AND NALOXONE 8; 2 MG/1; MG/1
1 FILM, SOLUBLE BUCCAL; SUBLINGUAL EVERY MORNING
COMMUNITY

## 2020-01-01 RX ORDER — PANTOPRAZOLE SODIUM 40 MG/1
40 TABLET, DELAYED RELEASE ORAL DAILY
COMMUNITY

## 2020-01-01 RX ORDER — SUCCINYLCHOLINE CHLORIDE 20 MG/ML
120 INJECTION INTRAMUSCULAR; INTRAVENOUS ONCE
Status: COMPLETED | OUTPATIENT
Start: 2020-01-01 | End: 2020-01-01

## 2020-01-01 RX ORDER — ASCORBIC ACID 500 MG
500 TABLET ORAL DAILY
Status: DISCONTINUED | OUTPATIENT
Start: 2020-01-01 | End: 2020-01-01 | Stop reason: HOSPADM

## 2020-01-01 RX ORDER — TOPIRAMATE 100 MG/1
100 TABLET, FILM COATED ORAL 2 TIMES DAILY
Status: DISCONTINUED | OUTPATIENT
Start: 2020-01-01 | End: 2020-01-01

## 2020-01-01 RX ORDER — HEPARIN SODIUM 1000 [USP'U]/ML
80 INJECTION, SOLUTION INTRAVENOUS; SUBCUTANEOUS PRN
Status: DISCONTINUED | OUTPATIENT
Start: 2020-01-01 | End: 2020-01-01

## 2020-01-01 RX ORDER — PROMETHAZINE HYDROCHLORIDE 25 MG/1
25 TABLET ORAL EVERY 12 HOURS
Status: DISCONTINUED | OUTPATIENT
Start: 2020-01-01 | End: 2020-01-01

## 2020-01-01 RX ORDER — MAGNESIUM SULFATE IN WATER 40 MG/ML
4 INJECTION, SOLUTION INTRAVENOUS ONCE
Status: COMPLETED | OUTPATIENT
Start: 2020-01-01 | End: 2020-01-01

## 2020-01-01 RX ORDER — MAGNESIUM SULFATE 1 G/100ML
1 INJECTION INTRAVENOUS PRN
Status: DISCONTINUED | OUTPATIENT
Start: 2020-01-01 | End: 2020-01-01 | Stop reason: HOSPADM

## 2020-01-01 RX ORDER — DEXTROSE MONOHYDRATE 50 MG/ML
100 INJECTION, SOLUTION INTRAVENOUS PRN
Status: DISCONTINUED | OUTPATIENT
Start: 2020-01-01 | End: 2020-01-01 | Stop reason: HOSPADM

## 2020-01-01 RX ORDER — DEXAMETHASONE SODIUM PHOSPHATE 10 MG/ML
6 INJECTION, SOLUTION INTRAMUSCULAR; INTRAVENOUS DAILY
Status: DISCONTINUED | OUTPATIENT
Start: 2020-01-01 | End: 2020-01-01 | Stop reason: DRUGHIGH

## 2020-01-01 RX ORDER — PROPOFOL 10 MG/ML
INJECTION, EMULSION INTRAVENOUS
Status: DISPENSED
Start: 2020-01-01 | End: 2020-01-01

## 2020-01-01 RX ORDER — SODIUM CHLORIDE 0.9 % (FLUSH) 0.9 %
10 SYRINGE (ML) INJECTION PRN
Status: DISCONTINUED | OUTPATIENT
Start: 2020-01-01 | End: 2020-01-01 | Stop reason: HOSPADM

## 2020-01-01 RX ORDER — IBUPROFEN 800 MG/1
800 TABLET ORAL EVERY 12 HOURS PRN
COMMUNITY

## 2020-01-01 RX ORDER — DEXTROSE MONOHYDRATE 50 MG/ML
INJECTION, SOLUTION INTRAVENOUS CONTINUOUS
Status: DISCONTINUED | OUTPATIENT
Start: 2020-01-01 | End: 2020-01-01 | Stop reason: HOSPADM

## 2020-01-01 RX ORDER — SODIUM CHLORIDE 0.9 % (FLUSH) 0.9 %
10 SYRINGE (ML) INJECTION EVERY 12 HOURS SCHEDULED
Status: DISCONTINUED | OUTPATIENT
Start: 2020-01-01 | End: 2020-01-01 | Stop reason: HOSPADM

## 2020-01-01 RX ORDER — POLYETHYLENE GLYCOL 3350 17 G/17G
17 POWDER, FOR SOLUTION ORAL DAILY PRN
Status: DISCONTINUED | OUTPATIENT
Start: 2020-01-01 | End: 2020-01-01 | Stop reason: HOSPADM

## 2020-01-01 RX ORDER — PANTOPRAZOLE SODIUM 40 MG/10ML
40 INJECTION, POWDER, LYOPHILIZED, FOR SOLUTION INTRAVENOUS 2 TIMES DAILY
Status: DISCONTINUED | OUTPATIENT
Start: 2020-01-01 | End: 2020-01-01

## 2020-01-01 RX ORDER — ETOMIDATE 2 MG/ML
INJECTION INTRAVENOUS
Status: COMPLETED
Start: 2020-01-01 | End: 2020-01-01

## 2020-01-01 RX ORDER — DIPHENHYDRAMINE HYDROCHLORIDE 50 MG/ML
12.5 INJECTION INTRAMUSCULAR; INTRAVENOUS ONCE
Status: COMPLETED | OUTPATIENT
Start: 2020-01-01 | End: 2020-01-01

## 2020-01-01 RX ORDER — LOPERAMIDE HYDROCHLORIDE 2 MG/1
2 CAPSULE ORAL EVERY 12 HOURS
Status: DISCONTINUED | OUTPATIENT
Start: 2020-01-01 | End: 2020-01-01

## 2020-01-01 RX ORDER — DEXAMETHASONE SODIUM PHOSPHATE 10 MG/ML
10 INJECTION, SOLUTION INTRAMUSCULAR; INTRAVENOUS ONCE
Status: COMPLETED | OUTPATIENT
Start: 2020-01-01 | End: 2020-01-01

## 2020-01-01 RX ORDER — NALOXONE HYDROCHLORIDE 1 MG/ML
INJECTION INTRAMUSCULAR; INTRAVENOUS; SUBCUTANEOUS
Status: COMPLETED
Start: 2020-01-01 | End: 2020-01-01

## 2020-01-01 RX ORDER — PROPOFOL 10 MG/ML
10 INJECTION, EMULSION INTRAVENOUS
Status: DISCONTINUED | OUTPATIENT
Start: 2020-01-01 | End: 2020-01-01

## 2020-01-01 RX ORDER — QUETIAPINE FUMARATE 50 MG/1
25 TABLET, FILM COATED ORAL NIGHTLY
Qty: 60 TABLET | Refills: 3 | Status: SHIPPED | OUTPATIENT
Start: 2020-01-01

## 2020-01-01 RX ORDER — SODIUM CHLORIDE 450 MG/100ML
INJECTION, SOLUTION INTRAVENOUS CONTINUOUS
Status: DISCONTINUED | OUTPATIENT
Start: 2020-01-01 | End: 2020-01-01

## 2020-01-01 RX ORDER — FUROSEMIDE 10 MG/ML
40 INJECTION INTRAMUSCULAR; INTRAVENOUS ONCE
Status: DISCONTINUED | OUTPATIENT
Start: 2020-01-01 | End: 2020-01-01

## 2020-01-01 RX ORDER — PROMETHAZINE HYDROCHLORIDE 25 MG/1
12.5 TABLET ORAL EVERY 6 HOURS PRN
Status: DISCONTINUED | OUTPATIENT
Start: 2020-01-01 | End: 2020-01-01 | Stop reason: HOSPADM

## 2020-01-01 RX ORDER — LIDOCAINE HYDROCHLORIDE AND EPINEPHRINE 10; 10 MG/ML; UG/ML
20 INJECTION, SOLUTION INFILTRATION; PERINEURAL ONCE
Status: DISCONTINUED | OUTPATIENT
Start: 2020-01-01 | End: 2020-01-01

## 2020-01-01 RX ORDER — M-VIT,TX,IRON,MINS/CALC/FOLIC 27MG-0.4MG
1 TABLET ORAL DAILY
COMMUNITY

## 2020-01-01 RX ORDER — ONDANSETRON 2 MG/ML
4 INJECTION INTRAMUSCULAR; INTRAVENOUS EVERY 6 HOURS PRN
Status: DISCONTINUED | OUTPATIENT
Start: 2020-01-01 | End: 2020-01-01 | Stop reason: HOSPADM

## 2020-01-01 RX ORDER — LORAZEPAM 0.5 MG/1
0.5 TABLET ORAL ONCE
Status: COMPLETED | OUTPATIENT
Start: 2020-01-01 | End: 2020-01-01

## 2020-01-01 RX ORDER — SODIUM CHLORIDE 9 MG/ML
10 INJECTION INTRAVENOUS DAILY
Status: DISCONTINUED | OUTPATIENT
Start: 2020-11-20 | End: 2020-01-01

## 2020-01-01 RX ORDER — SODIUM CHLORIDE, SODIUM LACTATE, POTASSIUM CHLORIDE, CALCIUM CHLORIDE 600; 310; 30; 20 MG/100ML; MG/100ML; MG/100ML; MG/100ML
1000 INJECTION, SOLUTION INTRAVENOUS ONCE
Status: COMPLETED | OUTPATIENT
Start: 2020-01-01 | End: 2020-01-01

## 2020-01-01 RX ORDER — POTASSIUM CHLORIDE 29.8 MG/ML
20 INJECTION INTRAVENOUS PRN
Status: DISCONTINUED | OUTPATIENT
Start: 2020-01-01 | End: 2020-01-01 | Stop reason: HOSPADM

## 2020-01-01 RX ORDER — ALBUTEROL SULFATE 2.5 MG/3ML
2.5 SOLUTION RESPIRATORY (INHALATION) EVERY 4 HOURS
Status: DISCONTINUED | OUTPATIENT
Start: 2020-01-01 | End: 2020-01-01

## 2020-01-01 RX ORDER — BUSPIRONE HYDROCHLORIDE 5 MG/1
5 TABLET ORAL 2 TIMES DAILY
Status: DISCONTINUED | OUTPATIENT
Start: 2020-01-01 | End: 2020-01-01

## 2020-01-01 RX ORDER — FUROSEMIDE 10 MG/ML
INJECTION INTRAMUSCULAR; INTRAVENOUS
Status: COMPLETED
Start: 2020-01-01 | End: 2020-01-01

## 2020-01-01 RX ORDER — VANCOMYCIN HYDROCHLORIDE 1 G/200ML
1000 INJECTION, SOLUTION INTRAVENOUS ONCE
Status: DISCONTINUED | OUTPATIENT
Start: 2020-01-01 | End: 2020-01-01 | Stop reason: SDUPTHER

## 2020-01-01 RX ORDER — FOLIC ACID 1 MG/1
1 TABLET ORAL DAILY
Qty: 30 TABLET | Refills: 3 | Status: SHIPPED | OUTPATIENT
Start: 2020-01-01

## 2020-01-01 RX ORDER — BUPRENORPHINE AND NALOXONE 2; .5 MG/1; MG/1
1 FILM, SOLUBLE BUCCAL; SUBLINGUAL EVERY EVENING
COMMUNITY

## 2020-01-01 RX ORDER — LORAZEPAM 2 MG/ML
0.5 INJECTION INTRAMUSCULAR EVERY 6 HOURS PRN
Status: DISCONTINUED | OUTPATIENT
Start: 2020-01-01 | End: 2020-01-01

## 2020-01-01 RX ORDER — METOCLOPRAMIDE HYDROCHLORIDE 5 MG/ML
10 INJECTION INTRAMUSCULAR; INTRAVENOUS ONCE
Status: COMPLETED | OUTPATIENT
Start: 2020-01-01 | End: 2020-01-01

## 2020-01-01 RX ORDER — NICOTINE POLACRILEX 4 MG
15 LOZENGE BUCCAL PRN
Status: DISCONTINUED | OUTPATIENT
Start: 2020-01-01 | End: 2020-01-01 | Stop reason: HOSPADM

## 2020-01-01 RX ORDER — ROCURONIUM BROMIDE 10 MG/ML
1 INJECTION, SOLUTION INTRAVENOUS ONCE
Status: COMPLETED | OUTPATIENT
Start: 2020-01-01 | End: 2020-01-01

## 2020-01-01 RX ORDER — HYDROCODONE BITARTRATE AND ACETAMINOPHEN 7.5; 325 MG/1; MG/1
1 TABLET ORAL EVERY 4 HOURS PRN
Status: DISCONTINUED | OUTPATIENT
Start: 2020-01-01 | End: 2020-01-01 | Stop reason: HOSPADM

## 2020-01-01 RX ORDER — SODIUM CHLORIDE 9 MG/ML
INJECTION, SOLUTION INTRAVENOUS CONTINUOUS
Status: DISCONTINUED | OUTPATIENT
Start: 2020-01-01 | End: 2020-01-01

## 2020-01-01 RX ORDER — PROPOFOL 10 MG/ML
INJECTION, EMULSION INTRAVENOUS
Status: COMPLETED
Start: 2020-01-01 | End: 2020-01-01

## 2020-01-01 RX ORDER — BUPRENORPHINE AND NALOXONE 2; .5 MG/1; MG/1
2 FILM, SOLUBLE BUCCAL; SUBLINGUAL DAILY
Status: DISCONTINUED | OUTPATIENT
Start: 2020-01-01 | End: 2020-01-01 | Stop reason: HOSPADM

## 2020-01-01 RX ORDER — LANOLIN ALCOHOL/MO/W.PET/CERES
9 CREAM (GRAM) TOPICAL NIGHTLY
Status: DISCONTINUED | OUTPATIENT
Start: 2020-01-01 | End: 2020-01-01

## 2020-01-01 RX ORDER — DEXTROSE MONOHYDRATE 25 G/50ML
12.5 INJECTION, SOLUTION INTRAVENOUS PRN
Status: DISCONTINUED | OUTPATIENT
Start: 2020-01-01 | End: 2020-01-01 | Stop reason: HOSPADM

## 2020-01-01 RX ORDER — HEPARIN SODIUM 10000 [USP'U]/100ML
18 INJECTION, SOLUTION INTRAVENOUS CONTINUOUS
Status: DISCONTINUED | OUTPATIENT
Start: 2020-01-01 | End: 2020-01-01 | Stop reason: ALTCHOICE

## 2020-01-01 RX ORDER — ACETAMINOPHEN 325 MG/1
650 TABLET ORAL EVERY 6 HOURS PRN
Status: DISCONTINUED | OUTPATIENT
Start: 2020-01-01 | End: 2020-01-01 | Stop reason: HOSPADM

## 2020-01-01 RX ORDER — M-VIT,TX,IRON,MINS/CALC/FOLIC 27MG-0.4MG
1 TABLET ORAL DAILY
Status: DISCONTINUED | OUTPATIENT
Start: 2020-01-01 | End: 2020-01-01

## 2020-01-01 RX ORDER — HEPARIN SODIUM 1000 [USP'U]/ML
40 INJECTION, SOLUTION INTRAVENOUS; SUBCUTANEOUS PRN
Status: DISCONTINUED | OUTPATIENT
Start: 2020-01-01 | End: 2020-01-01

## 2020-01-01 RX ORDER — HEPARIN SODIUM 1000 [USP'U]/ML
40 INJECTION, SOLUTION INTRAVENOUS; SUBCUTANEOUS PRN
Status: DISCONTINUED | OUTPATIENT
Start: 2020-01-01 | End: 2020-01-01 | Stop reason: ALTCHOICE

## 2020-01-01 RX ORDER — FOLIC ACID 1 MG/1
1 TABLET ORAL DAILY
Status: DISCONTINUED | OUTPATIENT
Start: 2020-01-01 | End: 2020-01-01

## 2020-01-01 RX ORDER — HEPARIN SODIUM 1000 [USP'U]/ML
80 INJECTION, SOLUTION INTRAVENOUS; SUBCUTANEOUS ONCE
Status: DISCONTINUED | OUTPATIENT
Start: 2020-01-01 | End: 2020-01-01 | Stop reason: SDUPTHER

## 2020-01-01 RX ORDER — BUPRENORPHINE AND NALOXONE 2; .5 MG/1; MG/1
1 FILM, SOLUBLE BUCCAL; SUBLINGUAL EVERY EVENING
Status: DISCONTINUED | OUTPATIENT
Start: 2020-01-01 | End: 2020-01-01

## 2020-01-01 RX ORDER — PROPOFOL 10 MG/ML
10 INJECTION, EMULSION INTRAVENOUS
Status: DISCONTINUED | OUTPATIENT
Start: 2020-01-01 | End: 2020-01-01 | Stop reason: HOSPADM

## 2020-01-01 RX ORDER — LINEZOLID 600 MG/1
600 TABLET, FILM COATED ORAL 2 TIMES DAILY
Qty: 10 TABLET | Refills: 0 | Status: SHIPPED | OUTPATIENT
Start: 2020-01-01 | End: 2020-01-01

## 2020-01-01 RX ORDER — LORAZEPAM 2 MG/ML
0.5 INJECTION INTRAMUSCULAR EVERY 4 HOURS PRN
Status: DISCONTINUED | OUTPATIENT
Start: 2020-01-01 | End: 2020-01-01 | Stop reason: HOSPADM

## 2020-01-01 RX ORDER — M-VIT,TX,IRON,MINS/CALC/FOLIC 27MG-0.4MG
1 TABLET ORAL DAILY
Status: DISCONTINUED | OUTPATIENT
Start: 2020-01-01 | End: 2020-01-01 | Stop reason: HOSPADM

## 2020-01-01 RX ORDER — LANOLIN ALCOHOL/MO/W.PET/CERES
3-6 CREAM (GRAM) TOPICAL NIGHTLY PRN
Status: ON HOLD | COMMUNITY
End: 2020-01-01 | Stop reason: HOSPADM

## 2020-01-01 RX ORDER — HEPARIN SODIUM 10000 [USP'U]/100ML
18 INJECTION, SOLUTION INTRAVENOUS CONTINUOUS
Status: DISCONTINUED | OUTPATIENT
Start: 2020-01-01 | End: 2020-01-01

## 2020-01-01 RX ORDER — LINEZOLID 2 MG/ML
600 INJECTION, SOLUTION INTRAVENOUS EVERY 12 HOURS
Status: DISCONTINUED | OUTPATIENT
Start: 2020-01-01 | End: 2020-01-01

## 2020-01-01 RX ORDER — LOPERAMIDE HYDROCHLORIDE 2 MG/1
2 CAPSULE ORAL EVERY 12 HOURS
COMMUNITY

## 2020-01-01 RX ORDER — BUSPIRONE HYDROCHLORIDE 5 MG/1
5 TABLET ORAL 2 TIMES DAILY
COMMUNITY

## 2020-01-01 RX ORDER — PANTOPRAZOLE SODIUM 40 MG/1
40 TABLET, DELAYED RELEASE ORAL
Status: DISCONTINUED | OUTPATIENT
Start: 2020-01-01 | End: 2020-01-01 | Stop reason: HOSPADM

## 2020-01-01 RX ORDER — ACETAMINOPHEN 650 MG/1
650 SUPPOSITORY RECTAL EVERY 6 HOURS PRN
Status: DISCONTINUED | OUTPATIENT
Start: 2020-01-01 | End: 2020-01-01 | Stop reason: HOSPADM

## 2020-01-01 RX ORDER — MORPHINE SULFATE 10 MG/ML
5 INJECTION, SOLUTION INTRAMUSCULAR; INTRAVENOUS
Status: DISCONTINUED | OUTPATIENT
Start: 2020-01-01 | End: 2020-01-01 | Stop reason: HOSPADM

## 2020-01-01 RX ORDER — GUAIFENESIN/DEXTROMETHORPHAN 100-10MG/5
10 SYRUP ORAL EVERY 4 HOURS PRN
Status: DISCONTINUED | OUTPATIENT
Start: 2020-01-01 | End: 2020-01-01 | Stop reason: HOSPADM

## 2020-01-01 RX ORDER — HEPARIN SODIUM 1000 [USP'U]/ML
80 INJECTION, SOLUTION INTRAVENOUS; SUBCUTANEOUS PRN
Status: DISCONTINUED | OUTPATIENT
Start: 2020-01-01 | End: 2020-01-01 | Stop reason: ALTCHOICE

## 2020-01-01 RX ORDER — CHOLECALCIFEROL (VITAMIN D3) 125 MCG
5 CAPSULE ORAL NIGHTLY PRN
Status: ON HOLD | COMMUNITY
End: 2020-01-01 | Stop reason: HOSPADM

## 2020-01-01 RX ORDER — ASCORBIC ACID 500 MG
500 TABLET ORAL DAILY
Status: DISCONTINUED | OUTPATIENT
Start: 2020-01-01 | End: 2020-01-01

## 2020-01-01 RX ORDER — HEPARIN SODIUM 1000 [USP'U]/ML
80 INJECTION, SOLUTION INTRAVENOUS; SUBCUTANEOUS ONCE
Status: COMPLETED | OUTPATIENT
Start: 2020-01-01 | End: 2020-01-01

## 2020-01-01 RX ORDER — BUPRENORPHINE AND NALOXONE 8; 2 MG/1; MG/1
1 FILM, SOLUBLE BUCCAL; SUBLINGUAL 2 TIMES DAILY
Status: ON HOLD | COMMUNITY
End: 2020-01-01

## 2020-01-01 RX ORDER — ETOMIDATE 2 MG/ML
0.3 INJECTION INTRAVENOUS ONCE
Status: COMPLETED | OUTPATIENT
Start: 2020-01-01 | End: 2020-01-01

## 2020-01-01 RX ORDER — ACETAMINOPHEN 650 MG/1
SUPPOSITORY RECTAL
Status: DISPENSED
Start: 2020-01-01 | End: 2020-01-01

## 2020-01-01 RX ORDER — ALBUTEROL SULFATE 90 UG/1
2 AEROSOL, METERED RESPIRATORY (INHALATION) EVERY 4 HOURS
Status: DISCONTINUED | OUTPATIENT
Start: 2020-01-01 | End: 2020-01-01 | Stop reason: HOSPADM

## 2020-01-01 RX ORDER — PHENOL 1.4 %
10 AEROSOL, SPRAY (ML) MUCOUS MEMBRANE NIGHTLY PRN
COMMUNITY

## 2020-01-01 RX ORDER — MAGNESIUM SULFATE IN WATER 40 MG/ML
2 INJECTION, SOLUTION INTRAVENOUS PRN
Status: DISCONTINUED | OUTPATIENT
Start: 2020-01-01 | End: 2020-01-01 | Stop reason: HOSPADM

## 2020-01-01 RX ORDER — ETOMIDATE 2 MG/ML
20 INJECTION INTRAVENOUS ONCE
Status: COMPLETED | OUTPATIENT
Start: 2020-01-01 | End: 2020-01-01

## 2020-01-01 RX ADMIN — Medication 10 ML: at 22:38

## 2020-01-01 RX ADMIN — PROPOFOL 80 MCG/KG/MIN: 10 INJECTION, EMULSION INTRAVENOUS at 09:42

## 2020-01-01 RX ADMIN — METRONIDAZOLE 500 MG: 500 INJECTION, SOLUTION INTRAVENOUS at 20:11

## 2020-01-01 RX ADMIN — METRONIDAZOLE 500 MG: 500 INJECTION, SOLUTION INTRAVENOUS at 21:34

## 2020-01-01 RX ADMIN — FAMOTIDINE 20 MG: 10 INJECTION, SOLUTION INTRAVENOUS at 11:32

## 2020-01-01 RX ADMIN — Medication 50 MCG/HR: at 02:41

## 2020-01-01 RX ADMIN — ENOXAPARIN SODIUM 70 MG: 80 INJECTION SUBCUTANEOUS at 21:08

## 2020-01-01 RX ADMIN — CEFEPIME HYDROCHLORIDE 2 G: 2 INJECTION, POWDER, FOR SOLUTION INTRAVENOUS at 14:13

## 2020-01-01 RX ADMIN — OYSTER SHELL CALCIUM WITH VITAMIN D 1 TABLET: 500; 200 TABLET, FILM COATED ORAL at 08:24

## 2020-01-01 RX ADMIN — OYSTER SHELL CALCIUM WITH VITAMIN D 1 TABLET: 500; 200 TABLET, FILM COATED ORAL at 20:37

## 2020-01-01 RX ADMIN — MULTIPLE VITAMINS W/ MINERALS TAB 1 TABLET: TAB at 20:36

## 2020-01-01 RX ADMIN — MAGNESIUM SULFATE 2 G: 2 INJECTION INTRAVENOUS at 08:22

## 2020-01-01 RX ADMIN — SODIUM CHLORIDE: 9 INJECTION, SOLUTION INTRAVENOUS at 08:26

## 2020-01-01 RX ADMIN — FOLIC ACID 1 MG: 1 TABLET ORAL at 20:13

## 2020-01-01 RX ADMIN — LORAZEPAM 0.5 MG: 2 INJECTION INTRAMUSCULAR; INTRAVENOUS at 09:25

## 2020-01-01 RX ADMIN — IBUPROFEN 800 MG: 800 TABLET, FILM COATED ORAL at 13:57

## 2020-01-01 RX ADMIN — Medication 20 MEQ: at 06:18

## 2020-01-01 RX ADMIN — PROPOFOL 80 MCG/KG/MIN: 10 INJECTION, EMULSION INTRAVENOUS at 00:10

## 2020-01-01 RX ADMIN — BUSPIRONE HYDROCHLORIDE 5 MG: 5 TABLET ORAL at 20:15

## 2020-01-01 RX ADMIN — Medication 1 EACH: at 23:30

## 2020-01-01 RX ADMIN — ENOXAPARIN SODIUM 70 MG: 80 INJECTION SUBCUTANEOUS at 21:00

## 2020-01-01 RX ADMIN — Medication 50 MCG/HR: at 03:10

## 2020-01-01 RX ADMIN — VANCOMYCIN HYDROCHLORIDE 1000 MG: 1 INJECTION, POWDER, LYOPHILIZED, FOR SOLUTION INTRAVENOUS at 04:02

## 2020-01-01 RX ADMIN — DEXAMETHASONE SODIUM PHOSPHATE 10 MG: 10 INJECTION, SOLUTION INTRAMUSCULAR; INTRAVENOUS at 10:33

## 2020-01-01 RX ADMIN — MULTIPLE VITAMINS W/ MINERALS TAB 1 TABLET: TAB at 09:27

## 2020-01-01 RX ADMIN — HYOSCYAMINE SULFATE: 16 SOLUTION at 20:23

## 2020-01-01 RX ADMIN — SODIUM CHLORIDE: 9 INJECTION, SOLUTION INTRAVENOUS at 21:45

## 2020-01-01 RX ADMIN — DEXAMETHASONE SODIUM PHOSPHATE 10 MG: 4 INJECTION, SOLUTION INTRA-ARTICULAR; INTRALESIONAL; INTRAMUSCULAR; INTRAVENOUS; SOFT TISSUE at 08:24

## 2020-01-01 RX ADMIN — ENOXAPARIN SODIUM 60 MG: 60 INJECTION SUBCUTANEOUS at 19:58

## 2020-01-01 RX ADMIN — Medication 20 MEQ: at 08:00

## 2020-01-01 RX ADMIN — MAGNESIUM SULFATE HEPTAHYDRATE 2 G: 40 INJECTION, SOLUTION INTRAVENOUS at 10:10

## 2020-01-01 RX ADMIN — Medication 2 PUFF: at 18:19

## 2020-01-01 RX ADMIN — LORAZEPAM 0.5 MG: 0.5 TABLET ORAL at 14:12

## 2020-01-01 RX ADMIN — ENOXAPARIN SODIUM 70 MG: 80 INJECTION SUBCUTANEOUS at 20:12

## 2020-01-01 RX ADMIN — ENOXAPARIN SODIUM 60 MG: 60 INJECTION SUBCUTANEOUS at 21:31

## 2020-01-01 RX ADMIN — POTASSIUM CHLORIDE 20 MEQ: 400 INJECTION, SOLUTION INTRAVENOUS at 06:50

## 2020-01-01 RX ADMIN — Medication 10 ML: at 19:59

## 2020-01-01 RX ADMIN — FOLIC ACID 1 MG: 1 TABLET ORAL at 20:36

## 2020-01-01 RX ADMIN — METRONIDAZOLE 500 MG: 500 INJECTION, SOLUTION INTRAVENOUS at 05:00

## 2020-01-01 RX ADMIN — SODIUM CHLORIDE 0.3 MCG/KG/HR: 9 INJECTION, SOLUTION INTRAVENOUS at 22:06

## 2020-01-01 RX ADMIN — PROMETHAZINE HYDROCHLORIDE 25 MG: 25 TABLET ORAL at 20:14

## 2020-01-01 RX ADMIN — VANCOMYCIN HYDROCHLORIDE 750 MG: 750 INJECTION, POWDER, LYOPHILIZED, FOR SOLUTION INTRAVENOUS at 23:49

## 2020-01-01 RX ADMIN — LORAZEPAM 0.5 MG: 2 INJECTION INTRAMUSCULAR; INTRAVENOUS at 01:18

## 2020-01-01 RX ADMIN — ENOXAPARIN SODIUM 70 MG: 80 INJECTION SUBCUTANEOUS at 09:43

## 2020-01-01 RX ADMIN — LORAZEPAM 0.5 MG: 2 INJECTION INTRAMUSCULAR; INTRAVENOUS at 16:19

## 2020-01-01 RX ADMIN — PROPOFOL 80 MCG/KG/MIN: 10 INJECTION, EMULSION INTRAVENOUS at 08:23

## 2020-01-01 RX ADMIN — PROMETHAZINE HYDROCHLORIDE 25 MG: 25 TABLET ORAL at 20:15

## 2020-01-01 RX ADMIN — IBUPROFEN 800 MG: 800 TABLET, FILM COATED ORAL at 15:02

## 2020-01-01 RX ADMIN — PHENYLEPHRINE HYDROCHLORIDE 50 MCG/MIN: 10 INJECTION INTRAVENOUS at 02:41

## 2020-01-01 RX ADMIN — IPRATROPIUM BROMIDE AND ALBUTEROL SULFATE 1 AMPULE: .5; 3 SOLUTION RESPIRATORY (INHALATION) at 12:27

## 2020-01-01 RX ADMIN — LORAZEPAM 0.5 MG: 2 INJECTION INTRAMUSCULAR; INTRAVENOUS at 02:21

## 2020-01-01 RX ADMIN — FOLIC ACID 1 MG: 1 TABLET ORAL at 20:15

## 2020-01-01 RX ADMIN — Medication 20 MEQ: at 09:47

## 2020-01-01 RX ADMIN — HYOSCYAMINE SULFATE: 16 SOLUTION at 20:17

## 2020-01-01 RX ADMIN — PHENYLEPHRINE HYDROCHLORIDE 50 MCG/MIN: 10 INJECTION INTRAVENOUS at 23:06

## 2020-01-01 RX ADMIN — SODIUM CHLORIDE 1000 ML: 9 INJECTION, SOLUTION INTRAVENOUS at 23:43

## 2020-01-01 RX ADMIN — QUETIAPINE FUMARATE 25 MG: 25 TABLET ORAL at 21:09

## 2020-01-01 RX ADMIN — PHENYLEPHRINE HYDROCHLORIDE 200 MCG/MIN: 10 INJECTION INTRAVENOUS at 05:14

## 2020-01-01 RX ADMIN — PROPOFOL 10 MG: 10 INJECTION, EMULSION INTRAVENOUS at 15:20

## 2020-01-01 RX ADMIN — CEFEPIME HYDROCHLORIDE 2 G: 2 INJECTION, POWDER, FOR SOLUTION INTRAVENOUS at 13:57

## 2020-01-01 RX ADMIN — METRONIDAZOLE 500 MG: 500 INJECTION, SOLUTION INTRAVENOUS at 04:54

## 2020-01-01 RX ADMIN — REMDESIVIR 100 MG: 100 INJECTION, POWDER, LYOPHILIZED, FOR SOLUTION INTRAVENOUS at 13:14

## 2020-01-01 RX ADMIN — Medication 2 PUFF: at 21:01

## 2020-01-01 RX ADMIN — HYOSCYAMINE SULFATE: 16 SOLUTION at 21:00

## 2020-01-01 RX ADMIN — DEXTROSE MONOHYDRATE: 50 INJECTION, SOLUTION INTRAVENOUS at 20:47

## 2020-01-01 RX ADMIN — OYSTER SHELL CALCIUM WITH VITAMIN D 1 TABLET: 500; 200 TABLET, FILM COATED ORAL at 21:29

## 2020-01-01 RX ADMIN — PANTOPRAZOLE SODIUM 40 MG: 40 TABLET, DELAYED RELEASE ORAL at 06:18

## 2020-01-01 RX ADMIN — Medication 100 MCG/HR: at 08:22

## 2020-01-01 RX ADMIN — ETOMIDATE 20.8 MG: 2 INJECTION INTRAVENOUS at 14:04

## 2020-01-01 RX ADMIN — Medication 500 MG: at 08:40

## 2020-01-01 RX ADMIN — Medication 2 PUFF: at 11:53

## 2020-01-01 RX ADMIN — BUSPIRONE HYDROCHLORIDE 5 MG: 5 TABLET ORAL at 21:01

## 2020-01-01 RX ADMIN — ENOXAPARIN SODIUM 60 MG: 60 INJECTION SUBCUTANEOUS at 08:40

## 2020-01-01 RX ADMIN — FAMOTIDINE 20 MG: 10 INJECTION, SOLUTION INTRAVENOUS at 07:56

## 2020-01-01 RX ADMIN — Medication 2 PUFF: at 08:02

## 2020-01-01 RX ADMIN — Medication 100 MCG/HR: at 02:17

## 2020-01-01 RX ADMIN — PROPOFOL 80 MCG/KG/MIN: 10 INJECTION, EMULSION INTRAVENOUS at 08:24

## 2020-01-01 RX ADMIN — CEFTRIAXONE 2 G: 2 INJECTION, POWDER, FOR SOLUTION INTRAMUSCULAR; INTRAVENOUS at 13:08

## 2020-01-01 RX ADMIN — Medication 10 ML: at 10:02

## 2020-01-01 RX ADMIN — FUROSEMIDE: 10 INJECTION, SOLUTION INTRAMUSCULAR; INTRAVENOUS at 16:55

## 2020-01-01 RX ADMIN — Medication 10 ML: at 21:05

## 2020-01-01 RX ADMIN — MULTIPLE VITAMINS W/ MINERALS TAB 1 TABLET: TAB at 21:29

## 2020-01-01 RX ADMIN — ENOXAPARIN SODIUM 70 MG: 80 INJECTION SUBCUTANEOUS at 20:24

## 2020-01-01 RX ADMIN — POTASSIUM CHLORIDE 20 MEQ: 400 INJECTION, SOLUTION INTRAVENOUS at 04:15

## 2020-01-01 RX ADMIN — CYANOCOBALAMIN TAB 1000 MCG 500 MCG: 1000 TAB at 20:14

## 2020-01-01 RX ADMIN — LORAZEPAM 0.5 MG: 2 INJECTION INTRAMUSCULAR; INTRAVENOUS at 05:01

## 2020-01-01 RX ADMIN — OYSTER SHELL CALCIUM WITH VITAMIN D 1 TABLET: 500; 200 TABLET, FILM COATED ORAL at 09:46

## 2020-01-01 RX ADMIN — HYOSCYAMINE SULFATE: 16 SOLUTION at 21:34

## 2020-01-01 RX ADMIN — REMDESIVIR 100 MG: 5 INJECTION INTRAVENOUS at 16:50

## 2020-01-01 RX ADMIN — HYDROCODONE BITARTRATE AND ACETAMINOPHEN 1 TABLET: 7.5; 325 TABLET ORAL at 20:35

## 2020-01-01 RX ADMIN — REMDESIVIR 100 MG: 5 INJECTION INTRAVENOUS at 16:44

## 2020-01-01 RX ADMIN — SODIUM CHLORIDE 1000 ML: 9 INJECTION, SOLUTION INTRAVENOUS at 07:00

## 2020-01-01 RX ADMIN — BUPRENORPHINE AND NALOXONE 2 FILM: 2; .5 FILM BUCCAL; SUBLINGUAL at 10:22

## 2020-01-01 RX ADMIN — Medication 18 UNITS/KG/HR: at 14:08

## 2020-01-01 RX ADMIN — POTASSIUM CHLORIDE 20 MEQ: 29.8 INJECTION, SOLUTION INTRAVENOUS at 20:04

## 2020-01-01 RX ADMIN — CYANOCOBALAMIN TAB 1000 MCG 500 MCG: 1000 TAB at 21:09

## 2020-01-01 RX ADMIN — Medication 20 MEQ: at 11:13

## 2020-01-01 RX ADMIN — PROMETHAZINE HYDROCHLORIDE 25 MG: 25 TABLET ORAL at 21:29

## 2020-01-01 RX ADMIN — VANCOMYCIN HYDROCHLORIDE 750 MG: 750 INJECTION, POWDER, LYOPHILIZED, FOR SOLUTION INTRAVENOUS at 03:13

## 2020-01-01 RX ADMIN — LEVOFLOXACIN 750 MG: 5 INJECTION, SOLUTION INTRAVENOUS at 19:00

## 2020-01-01 RX ADMIN — OXYCODONE HYDROCHLORIDE AND ACETAMINOPHEN 500 MG: 500 TABLET ORAL at 20:13

## 2020-01-01 RX ADMIN — QUETIAPINE FUMARATE 25 MG: 25 TABLET ORAL at 20:15

## 2020-01-01 RX ADMIN — Medication 50 MCG/HR: at 21:29

## 2020-01-01 RX ADMIN — Medication 30 MCG/MIN: at 23:43

## 2020-01-01 RX ADMIN — CEFEPIME HYDROCHLORIDE 2 G: 2 INJECTION, POWDER, FOR SOLUTION INTRAVENOUS at 02:21

## 2020-01-01 RX ADMIN — Medication 2 MCG/MIN: at 22:39

## 2020-01-01 RX ADMIN — ENOXAPARIN SODIUM 70 MG: 80 INJECTION SUBCUTANEOUS at 21:30

## 2020-01-01 RX ADMIN — REMDESIVIR 100 MG: 5 INJECTION INTRAVENOUS at 18:25

## 2020-01-01 RX ADMIN — OXYCODONE HYDROCHLORIDE AND ACETAMINOPHEN 500 MG: 500 TABLET ORAL at 21:01

## 2020-01-01 RX ADMIN — SODIUM CHLORIDE 0.2 MCG/KG/HR: 9 INJECTION, SOLUTION INTRAVENOUS at 05:17

## 2020-01-01 RX ADMIN — HYOSCYAMINE SULFATE: 16 SOLUTION at 23:03

## 2020-01-01 RX ADMIN — IBUPROFEN 800 MG: 800 TABLET, FILM COATED ORAL at 10:00

## 2020-01-01 RX ADMIN — BUSPIRONE HYDROCHLORIDE 5 MG: 5 TABLET ORAL at 09:46

## 2020-01-01 RX ADMIN — Medication 10 ML: at 21:31

## 2020-01-01 RX ADMIN — MULTIPLE VITAMINS W/ MINERALS TAB 1 TABLET: TAB at 08:23

## 2020-01-01 RX ADMIN — POTASSIUM CHLORIDE 20 MEQ: 29.8 INJECTION, SOLUTION INTRAVENOUS at 21:11

## 2020-01-01 RX ADMIN — LOPERAMIDE HYDROCHLORIDE 2 MG: 2 CAPSULE ORAL at 07:57

## 2020-01-01 RX ADMIN — Medication 20 MEQ: at 11:35

## 2020-01-01 RX ADMIN — LIDOCAINE HYDROCHLORIDE,EPINEPHRINE BITARTRATE 20 ML: 10; .01 INJECTION, SOLUTION INFILTRATION; PERINEURAL at 13:00

## 2020-01-01 RX ADMIN — DEXTROSE MONOHYDRATE: 50 INJECTION, SOLUTION INTRAVENOUS at 13:14

## 2020-01-01 RX ADMIN — ETOMIDATE 20 MG: 20 INJECTION, SOLUTION INTRAVENOUS at 09:12

## 2020-01-01 RX ADMIN — OXYCODONE HYDROCHLORIDE AND ACETAMINOPHEN 500 MG: 500 TABLET ORAL at 20:36

## 2020-01-01 RX ADMIN — MULTIPLE VITAMINS W/ MINERALS TAB 1 TABLET: TAB at 21:01

## 2020-01-01 RX ADMIN — IBUPROFEN 800 MG: 800 TABLET, FILM COATED ORAL at 07:57

## 2020-01-01 RX ADMIN — Medication 2 PUFF: at 11:54

## 2020-01-01 RX ADMIN — METRONIDAZOLE 500 MG: 500 INJECTION, SOLUTION INTRAVENOUS at 04:25

## 2020-01-01 RX ADMIN — Medication 1 EACH: at 23:55

## 2020-01-01 RX ADMIN — HYOSCYAMINE SULFATE: 16 SOLUTION at 08:04

## 2020-01-01 RX ADMIN — LOPERAMIDE HYDROCHLORIDE 2 MG: 2 CAPSULE ORAL at 11:01

## 2020-01-01 RX ADMIN — IBUPROFEN 800 MG: 800 TABLET, FILM COATED ORAL at 12:04

## 2020-01-01 RX ADMIN — HYDROCODONE BITARTRATE AND ACETAMINOPHEN 1 TABLET: 7.5; 325 TABLET ORAL at 21:07

## 2020-01-01 RX ADMIN — Medication 1 G: at 13:06

## 2020-01-01 RX ADMIN — LOPERAMIDE HYDROCHLORIDE 2 MG: 2 CAPSULE ORAL at 20:14

## 2020-01-01 RX ADMIN — LORAZEPAM 0.5 MG: 2 INJECTION INTRAMUSCULAR; INTRAVENOUS at 08:00

## 2020-01-01 RX ADMIN — PROMETHAZINE HYDROCHLORIDE 25 MG: 25 TABLET ORAL at 07:57

## 2020-01-01 RX ADMIN — DEXAMETHASONE SODIUM PHOSPHATE 20 MG: 4 INJECTION, SOLUTION INTRA-ARTICULAR; INTRALESIONAL; INTRAMUSCULAR; INTRAVENOUS; SOFT TISSUE at 09:00

## 2020-01-01 RX ADMIN — CEFTRIAXONE 2 G: 2 INJECTION, POWDER, FOR SOLUTION INTRAMUSCULAR; INTRAVENOUS at 12:55

## 2020-01-01 RX ADMIN — POTASSIUM CHLORIDE 20 MEQ: 29.8 INJECTION, SOLUTION INTRAVENOUS at 20:06

## 2020-01-01 RX ADMIN — BUSPIRONE HYDROCHLORIDE 5 MG: 5 TABLET ORAL at 11:02

## 2020-01-01 RX ADMIN — CYANOCOBALAMIN TAB 1000 MCG 500 MCG: 1000 TAB at 21:01

## 2020-01-01 RX ADMIN — MEROPENEM 1 G: 1 INJECTION, POWDER, FOR SOLUTION INTRAVENOUS at 11:00

## 2020-01-01 RX ADMIN — LOPERAMIDE HYDROCHLORIDE 2 MG: 2 CAPSULE ORAL at 01:05

## 2020-01-01 RX ADMIN — Medication 10 ML: at 20:53

## 2020-01-01 RX ADMIN — DIPHENHYDRAMINE HYDROCHLORIDE 12.5 MG: 50 INJECTION, SOLUTION INTRAMUSCULAR; INTRAVENOUS at 10:33

## 2020-01-01 RX ADMIN — Medication 2 PUFF: at 16:13

## 2020-01-01 RX ADMIN — OYSTER SHELL CALCIUM WITH VITAMIN D 1 TABLET: 500; 200 TABLET, FILM COATED ORAL at 08:27

## 2020-01-01 RX ADMIN — QUETIAPINE FUMARATE 25 MG: 25 TABLET ORAL at 20:14

## 2020-01-01 RX ADMIN — OXYCODONE HYDROCHLORIDE AND ACETAMINOPHEN 500 MG: 500 TABLET ORAL at 21:29

## 2020-01-01 RX ADMIN — HYDROCODONE BITARTRATE AND ACETAMINOPHEN 1 TABLET: 7.5; 325 TABLET ORAL at 08:42

## 2020-01-01 RX ADMIN — VASOPRESSIN 0.03 UNITS/MIN: 20 INJECTION INTRAVENOUS at 15:30

## 2020-01-01 RX ADMIN — FAMOTIDINE 20 MG: 10 INJECTION, SOLUTION INTRAVENOUS at 21:09

## 2020-01-01 RX ADMIN — LOPERAMIDE HYDROCHLORIDE 2 MG: 2 CAPSULE ORAL at 20:15

## 2020-01-01 RX ADMIN — PROPOFOL 80 MCG/KG/MIN: 10 INJECTION, EMULSION INTRAVENOUS at 17:07

## 2020-01-01 RX ADMIN — CEFTRIAXONE 2 G: 2 INJECTION, POWDER, FOR SOLUTION INTRAMUSCULAR; INTRAVENOUS at 12:11

## 2020-01-01 RX ADMIN — PROPOFOL 5 MCG/KG/MIN: 10 INJECTION, EMULSION INTRAVENOUS at 09:20

## 2020-01-01 RX ADMIN — LOPERAMIDE HYDROCHLORIDE 2 MG: 2 CAPSULE ORAL at 09:42

## 2020-01-01 RX ADMIN — Medication 25 MCG/MIN: at 05:19

## 2020-01-01 RX ADMIN — REMDESIVIR 100 MG: 5 INJECTION INTRAVENOUS at 18:02

## 2020-01-01 RX ADMIN — Medication 500 MG: at 09:27

## 2020-01-01 RX ADMIN — Medication 10 ML: at 09:20

## 2020-01-01 RX ADMIN — MEROPENEM 1 G: 1 INJECTION, POWDER, FOR SOLUTION INTRAVENOUS at 17:48

## 2020-01-01 RX ADMIN — MAGNESIUM SULFATE HEPTAHYDRATE 4 G: 40 INJECTION, SOLUTION INTRAVENOUS at 11:32

## 2020-01-01 RX ADMIN — Medication 10 ML: at 08:11

## 2020-01-01 RX ADMIN — Medication 2 PUFF: at 08:29

## 2020-01-01 RX ADMIN — BUSPIRONE HYDROCHLORIDE 5 MG: 5 TABLET ORAL at 21:09

## 2020-01-01 RX ADMIN — CEFTRIAXONE 2 G: 2 INJECTION, POWDER, FOR SOLUTION INTRAMUSCULAR; INTRAVENOUS at 13:34

## 2020-01-01 RX ADMIN — PANTOPRAZOLE SODIUM 40 MG: 40 TABLET, DELAYED RELEASE ORAL at 05:29

## 2020-01-01 RX ADMIN — METRONIDAZOLE 500 MG: 500 INJECTION, SOLUTION INTRAVENOUS at 11:45

## 2020-01-01 RX ADMIN — HYOSCYAMINE SULFATE: 16 SOLUTION at 09:47

## 2020-01-01 RX ADMIN — LORAZEPAM 0.5 MG: 2 INJECTION INTRAMUSCULAR; INTRAVENOUS at 14:12

## 2020-01-01 RX ADMIN — HYOSCYAMINE SULFATE: 16 SOLUTION at 08:43

## 2020-01-01 RX ADMIN — Medication 500 MG: at 09:18

## 2020-01-01 RX ADMIN — Medication 2 PUFF: at 08:33

## 2020-01-01 RX ADMIN — Medication 500 MG: at 09:31

## 2020-01-01 RX ADMIN — Medication 2 PUFF: at 19:52

## 2020-01-01 RX ADMIN — PROMETHAZINE HYDROCHLORIDE 25 MG: 25 TABLET ORAL at 09:46

## 2020-01-01 RX ADMIN — OYSTER SHELL CALCIUM WITH VITAMIN D 1 TABLET: 500; 200 TABLET, FILM COATED ORAL at 07:57

## 2020-01-01 RX ADMIN — LINEZOLID 600 MG: 600 INJECTION, SOLUTION INTRAVENOUS at 10:57

## 2020-01-01 RX ADMIN — ENOXAPARIN SODIUM 40 MG: 40 INJECTION SUBCUTANEOUS at 08:11

## 2020-01-01 RX ADMIN — SODIUM CHLORIDE: 9 INJECTION, SOLUTION INTRAVENOUS at 15:59

## 2020-01-01 RX ADMIN — VANCOMYCIN HYDROCHLORIDE 750 MG: 750 INJECTION, POWDER, LYOPHILIZED, FOR SOLUTION INTRAVENOUS at 03:18

## 2020-01-01 RX ADMIN — ENOXAPARIN SODIUM 70 MG: 80 INJECTION SUBCUTANEOUS at 11:01

## 2020-01-01 RX ADMIN — Medication 30 MCG/MIN: at 15:30

## 2020-01-01 RX ADMIN — SODIUM CHLORIDE, POTASSIUM CHLORIDE, SODIUM LACTATE AND CALCIUM CHLORIDE 1000 ML: 600; 310; 30; 20 INJECTION, SOLUTION INTRAVENOUS at 23:45

## 2020-01-01 RX ADMIN — SUCCINYLCHOLINE CHLORIDE 120 MG: 20 INJECTION, SOLUTION INTRAMUSCULAR; INTRAVENOUS at 09:13

## 2020-01-01 RX ADMIN — METRONIDAZOLE 500 MG: 500 INJECTION, SOLUTION INTRAVENOUS at 20:30

## 2020-01-01 RX ADMIN — BUSPIRONE HYDROCHLORIDE 5 MG: 5 TABLET ORAL at 07:57

## 2020-01-01 RX ADMIN — FOLIC ACID 1 MG: 1 TABLET ORAL at 09:18

## 2020-01-01 RX ADMIN — PHENYLEPHRINE HYDROCHLORIDE 200 MCG/MIN: 10 INJECTION INTRAVENOUS at 11:02

## 2020-01-01 RX ADMIN — FAMOTIDINE 20 MG: 10 INJECTION, SOLUTION INTRAVENOUS at 20:25

## 2020-01-01 RX ADMIN — QUETIAPINE FUMARATE 25 MG: 25 TABLET ORAL at 21:30

## 2020-01-01 RX ADMIN — ENOXAPARIN SODIUM 70 MG: 80 INJECTION SUBCUTANEOUS at 20:15

## 2020-01-01 RX ADMIN — Medication 20 MEQ: at 12:34

## 2020-01-01 RX ADMIN — SODIUM CHLORIDE 1000 ML: 9 INJECTION, SOLUTION INTRAVENOUS at 10:02

## 2020-01-01 RX ADMIN — BUSPIRONE HYDROCHLORIDE 5 MG: 5 TABLET ORAL at 20:37

## 2020-01-01 RX ADMIN — SODIUM CHLORIDE 0.2 MCG/KG/HR: 9 INJECTION, SOLUTION INTRAVENOUS at 00:11

## 2020-01-01 RX ADMIN — PROMETHAZINE HYDROCHLORIDE 25 MG: 25 TABLET ORAL at 11:01

## 2020-01-01 RX ADMIN — OYSTER SHELL CALCIUM WITH VITAMIN D 1 TABLET: 500; 200 TABLET, FILM COATED ORAL at 21:02

## 2020-01-01 RX ADMIN — LORAZEPAM 0.5 MG: 2 INJECTION INTRAMUSCULAR; INTRAVENOUS at 17:06

## 2020-01-01 RX ADMIN — OYSTER SHELL CALCIUM WITH VITAMIN D 1 TABLET: 500; 200 TABLET, FILM COATED ORAL at 11:01

## 2020-01-01 RX ADMIN — METRONIDAZOLE 500 MG: 500 INJECTION, SOLUTION INTRAVENOUS at 12:55

## 2020-01-01 RX ADMIN — BUSPIRONE HYDROCHLORIDE 5 MG: 5 TABLET ORAL at 21:29

## 2020-01-01 RX ADMIN — PROPOFOL 55 MCG/KG/MIN: 10 INJECTION, EMULSION INTRAVENOUS at 13:35

## 2020-01-01 RX ADMIN — HYOSCYAMINE SULFATE: 16 SOLUTION at 08:09

## 2020-01-01 RX ADMIN — PROPOFOL 80 MCG/KG/MIN: 10 INJECTION, EMULSION INTRAVENOUS at 02:17

## 2020-01-01 RX ADMIN — MULTIPLE VITAMINS W/ MINERALS TAB 1 TABLET: TAB at 09:18

## 2020-01-01 RX ADMIN — CYANOCOBALAMIN TAB 1000 MCG 500 MCG: 1000 TAB at 20:15

## 2020-01-01 RX ADMIN — METRONIDAZOLE 500 MG: 500 INJECTION, SOLUTION INTRAVENOUS at 21:07

## 2020-01-01 RX ADMIN — Medication 500 MG: at 08:23

## 2020-01-01 RX ADMIN — ETOMIDATE 20 MG: 2 INJECTION INTRAVENOUS at 21:34

## 2020-01-01 RX ADMIN — ROCURONIUM BROMIDE 69 MG: 10 INJECTION INTRAVENOUS at 14:04

## 2020-01-01 RX ADMIN — SODIUM CHLORIDE: 9 INJECTION, SOLUTION INTRAVENOUS at 06:41

## 2020-01-01 RX ADMIN — ENOXAPARIN SODIUM 60 MG: 60 INJECTION SUBCUTANEOUS at 21:11

## 2020-01-01 RX ADMIN — PROPOFOL 50 MCG/KG/MIN: 10 INJECTION, EMULSION INTRAVENOUS at 03:08

## 2020-01-01 RX ADMIN — PROMETHAZINE HYDROCHLORIDE 25 MG: 25 TABLET ORAL at 09:42

## 2020-01-01 RX ADMIN — BUSPIRONE HYDROCHLORIDE 5 MG: 5 TABLET ORAL at 08:24

## 2020-01-01 RX ADMIN — IBUPROFEN 800 MG: 800 TABLET, FILM COATED ORAL at 08:24

## 2020-01-01 RX ADMIN — Medication 20 MEQ: at 05:27

## 2020-01-01 RX ADMIN — LORAZEPAM 0.5 MG: 2 INJECTION INTRAMUSCULAR; INTRAVENOUS at 05:17

## 2020-01-01 RX ADMIN — APIXABAN 10 MG: 5 TABLET, FILM COATED ORAL at 09:27

## 2020-01-01 RX ADMIN — VANCOMYCIN HYDROCHLORIDE 750 MG: 750 INJECTION, POWDER, LYOPHILIZED, FOR SOLUTION INTRAVENOUS at 15:19

## 2020-01-01 RX ADMIN — OXYCODONE HYDROCHLORIDE AND ACETAMINOPHEN 500 MG: 500 TABLET ORAL at 21:10

## 2020-01-01 RX ADMIN — HYOSCYAMINE SULFATE: 16 SOLUTION at 21:31

## 2020-01-01 RX ADMIN — METRONIDAZOLE 500 MG: 500 INJECTION, SOLUTION INTRAVENOUS at 11:30

## 2020-01-01 RX ADMIN — ETOMIDATE 20 MG: 2 INJECTION INTRAVENOUS at 09:12

## 2020-01-01 RX ADMIN — METRONIDAZOLE 500 MG: 500 INJECTION, SOLUTION INTRAVENOUS at 13:07

## 2020-01-01 RX ADMIN — LORAZEPAM 0.5 MG: 2 INJECTION INTRAMUSCULAR; INTRAVENOUS at 22:30

## 2020-01-01 RX ADMIN — METRONIDAZOLE 500 MG: 500 INJECTION, SOLUTION INTRAVENOUS at 12:11

## 2020-01-01 RX ADMIN — SODIUM CHLORIDE: 9 INJECTION, SOLUTION INTRAVENOUS at 00:10

## 2020-01-01 RX ADMIN — FOLIC ACID 1 MG: 1 TABLET ORAL at 08:23

## 2020-01-01 RX ADMIN — PROMETHAZINE HYDROCHLORIDE 25 MG: 25 TABLET ORAL at 08:23

## 2020-01-01 RX ADMIN — MELATONIN TAB 3 MG 9 MG: 3 TAB at 20:36

## 2020-01-01 RX ADMIN — LOPERAMIDE HYDROCHLORIDE 2 MG: 2 CAPSULE ORAL at 21:08

## 2020-01-01 RX ADMIN — PHENYLEPHRINE HYDROCHLORIDE 100 MCG/MIN: 10 INJECTION INTRAVENOUS at 13:16

## 2020-01-01 RX ADMIN — DEXAMETHASONE SODIUM PHOSPHATE 10 MG: 4 INJECTION, SOLUTION INTRA-ARTICULAR; INTRALESIONAL; INTRAMUSCULAR; INTRAVENOUS; SOFT TISSUE at 08:08

## 2020-01-01 RX ADMIN — ENOXAPARIN SODIUM 60 MG: 60 INJECTION SUBCUTANEOUS at 09:17

## 2020-01-01 RX ADMIN — LORAZEPAM 0.5 MG: 2 INJECTION INTRAMUSCULAR; INTRAVENOUS at 00:21

## 2020-01-01 RX ADMIN — POTASSIUM CHLORIDE 20 MEQ: 400 INJECTION, SOLUTION INTRAVENOUS at 05:18

## 2020-01-01 RX ADMIN — MULTIPLE VITAMINS W/ MINERALS TAB 1 TABLET: TAB at 08:40

## 2020-01-01 RX ADMIN — MULTIPLE VITAMINS W/ MINERALS TAB 1 TABLET: TAB at 21:09

## 2020-01-01 RX ADMIN — FAMOTIDINE 20 MG: 10 INJECTION, SOLUTION INTRAVENOUS at 20:16

## 2020-01-01 RX ADMIN — PHENYLEPHRINE HYDROCHLORIDE 50 MCG/MIN: 10 INJECTION INTRAVENOUS at 01:18

## 2020-01-01 RX ADMIN — LOPERAMIDE HYDROCHLORIDE 2 MG: 2 CAPSULE ORAL at 08:27

## 2020-01-01 RX ADMIN — Medication 2 PUFF: at 23:22

## 2020-01-01 RX ADMIN — PROPOFOL 15 MCG/KG/MIN: 10 INJECTION, EMULSION INTRAVENOUS at 02:40

## 2020-01-01 RX ADMIN — MULTIPLE VITAMINS W/ MINERALS TAB 1 TABLET: TAB at 09:31

## 2020-01-01 RX ADMIN — APIXABAN 5 MG: 5 TABLET, FILM COATED ORAL at 08:27

## 2020-01-01 RX ADMIN — SODIUM CHLORIDE: 4.5 INJECTION, SOLUTION INTRAVENOUS at 05:52

## 2020-01-01 RX ADMIN — PROPOFOL 80 MCG/KG/MIN: 10 INJECTION, EMULSION INTRAVENOUS at 23:49

## 2020-01-01 RX ADMIN — LINEZOLID 600 MG: 600 INJECTION, SOLUTION INTRAVENOUS at 18:10

## 2020-01-01 RX ADMIN — ENOXAPARIN SODIUM 70 MG: 80 INJECTION SUBCUTANEOUS at 08:01

## 2020-01-01 RX ADMIN — BUPRENORPHINE AND NALOXONE 2 FILM: 2; .5 FILM BUCCAL; SUBLINGUAL at 12:17

## 2020-01-01 RX ADMIN — IBUPROFEN 800 MG: 800 TABLET, FILM COATED ORAL at 16:41

## 2020-01-01 RX ADMIN — QUETIAPINE FUMARATE 25 MG: 25 TABLET ORAL at 21:01

## 2020-01-01 RX ADMIN — OYSTER SHELL CALCIUM WITH VITAMIN D 1 TABLET: 500; 200 TABLET, FILM COATED ORAL at 20:15

## 2020-01-01 RX ADMIN — LOPERAMIDE HYDROCHLORIDE 2 MG: 2 CAPSULE ORAL at 09:46

## 2020-01-01 RX ADMIN — ENOXAPARIN SODIUM 60 MG: 60 INJECTION SUBCUTANEOUS at 09:32

## 2020-01-01 RX ADMIN — PROMETHAZINE HYDROCHLORIDE 25 MG: 25 TABLET ORAL at 08:27

## 2020-01-01 RX ADMIN — Medication 2 PUFF: at 08:05

## 2020-01-01 RX ADMIN — ENOXAPARIN SODIUM 40 MG: 40 INJECTION SUBCUTANEOUS at 10:02

## 2020-01-01 RX ADMIN — CYANOCOBALAMIN TAB 1000 MCG 500 MCG: 1000 TAB at 20:36

## 2020-01-01 RX ADMIN — LORAZEPAM 0.5 MG: 2 INJECTION INTRAMUSCULAR; INTRAVENOUS at 22:38

## 2020-01-01 RX ADMIN — Medication 2 PUFF: at 20:44

## 2020-01-01 RX ADMIN — LORAZEPAM 0.5 MG: 2 INJECTION INTRAMUSCULAR; INTRAVENOUS at 15:29

## 2020-01-01 RX ADMIN — PROPOFOL 80 MCG/KG/MIN: 10 INJECTION, EMULSION INTRAVENOUS at 20:39

## 2020-01-01 RX ADMIN — HYOSCYAMINE SULFATE: 16 SOLUTION at 09:30

## 2020-01-01 RX ADMIN — VASOPRESSIN 0.02 UNITS/MIN: 20 INJECTION INTRAVENOUS at 03:08

## 2020-01-01 RX ADMIN — BUSPIRONE HYDROCHLORIDE 5 MG: 5 TABLET ORAL at 08:27

## 2020-01-01 RX ADMIN — BUPRENORPHINE AND NALOXONE 2 FILM: 2; .5 FILM BUCCAL; SUBLINGUAL at 09:33

## 2020-01-01 RX ADMIN — PROPOFOL 30 MCG/KG/MIN: 10 INJECTION, EMULSION INTRAVENOUS at 22:25

## 2020-01-01 RX ADMIN — FENTANYL CITRATE 50 MCG/HR: 50 INJECTION, SOLUTION INTRAMUSCULAR; INTRAVENOUS at 00:11

## 2020-01-01 RX ADMIN — ENOXAPARIN SODIUM 70 MG: 80 INJECTION SUBCUTANEOUS at 08:23

## 2020-01-01 RX ADMIN — LORAZEPAM 0.5 MG: 2 INJECTION INTRAMUSCULAR; INTRAVENOUS at 11:14

## 2020-01-01 RX ADMIN — PROMETHAZINE HYDROCHLORIDE 25 MG: 25 TABLET ORAL at 20:36

## 2020-01-01 RX ADMIN — SODIUM CHLORIDE 0.2 MCG/KG/HR: 9 INJECTION, SOLUTION INTRAVENOUS at 11:59

## 2020-01-01 RX ADMIN — Medication 2 PUFF: at 15:59

## 2020-01-01 RX ADMIN — HYOSCYAMINE SULFATE: 16 SOLUTION at 20:47

## 2020-01-01 RX ADMIN — OYSTER SHELL CALCIUM WITH VITAMIN D 1 TABLET: 500; 200 TABLET, FILM COATED ORAL at 09:42

## 2020-01-01 RX ADMIN — IBUPROFEN 800 MG: 800 TABLET, FILM COATED ORAL at 09:43

## 2020-01-01 RX ADMIN — HYOSCYAMINE SULFATE: 16 SOLUTION at 09:07

## 2020-01-01 RX ADMIN — REMDESIVIR 100 MG: 5 INJECTION INTRAVENOUS at 17:47

## 2020-01-01 RX ADMIN — Medication 50 MCG/HR: at 20:16

## 2020-01-01 RX ADMIN — HYDROCODONE BITARTRATE AND ACETAMINOPHEN 1 TABLET: 7.5; 325 TABLET ORAL at 15:02

## 2020-01-01 RX ADMIN — FOLIC ACID 1 MG: 1 TABLET ORAL at 21:29

## 2020-01-01 RX ADMIN — REMDESIVIR 100 MG: 100 INJECTION, POWDER, LYOPHILIZED, FOR SOLUTION INTRAVENOUS at 12:36

## 2020-01-01 RX ADMIN — Medication 10 ML: at 22:26

## 2020-01-01 RX ADMIN — Medication 100 MCG/HR: at 23:05

## 2020-01-01 RX ADMIN — Medication 10 ML: at 10:25

## 2020-01-01 RX ADMIN — LORAZEPAM 0.5 MG: 2 INJECTION INTRAMUSCULAR; INTRAVENOUS at 20:13

## 2020-01-01 RX ADMIN — PROPOFOL 55 MCG/KG/MIN: 10 INJECTION, EMULSION INTRAVENOUS at 02:02

## 2020-01-01 RX ADMIN — SODIUM CHLORIDE: 9 INJECTION, SOLUTION INTRAVENOUS at 12:49

## 2020-01-01 RX ADMIN — PANTOPRAZOLE SODIUM 40 MG: 40 TABLET, DELAYED RELEASE ORAL at 07:03

## 2020-01-01 RX ADMIN — OXYCODONE HYDROCHLORIDE AND ACETAMINOPHEN 500 MG: 500 TABLET ORAL at 20:15

## 2020-01-01 RX ADMIN — PANTOPRAZOLE SODIUM 40 MG: 40 TABLET, DELAYED RELEASE ORAL at 05:53

## 2020-01-01 RX ADMIN — HYDROCODONE BITARTRATE AND ACETAMINOPHEN 1 TABLET: 7.5; 325 TABLET ORAL at 05:35

## 2020-01-01 RX ADMIN — HYOSCYAMINE SULFATE: 16 SOLUTION at 09:14

## 2020-01-01 RX ADMIN — LOPERAMIDE HYDROCHLORIDE 2 MG: 2 CAPSULE ORAL at 20:36

## 2020-01-01 RX ADMIN — APIXABAN 10 MG: 5 TABLET, FILM COATED ORAL at 22:26

## 2020-01-01 RX ADMIN — HYDROCODONE BITARTRATE AND ACETAMINOPHEN 1 TABLET: 7.5; 325 TABLET ORAL at 16:56

## 2020-01-01 RX ADMIN — DEXTROSE MONOHYDRATE: 50 INJECTION, SOLUTION INTRAVENOUS at 05:19

## 2020-01-01 RX ADMIN — BUSPIRONE HYDROCHLORIDE 5 MG: 5 TABLET ORAL at 09:43

## 2020-01-01 RX ADMIN — VASOPRESSIN 0.03 UNITS/MIN: 20 INJECTION INTRAVENOUS at 02:40

## 2020-01-01 RX ADMIN — MULTIPLE VITAMINS W/ MINERALS TAB 1 TABLET: TAB at 20:14

## 2020-01-01 RX ADMIN — Medication 2 PUFF: at 03:35

## 2020-01-01 RX ADMIN — Medication 10 ML: at 08:26

## 2020-01-01 RX ADMIN — Medication 30 MCG/MIN: at 05:12

## 2020-01-01 RX ADMIN — LEVOFLOXACIN 750 MG: 5 INJECTION, SOLUTION INTRAVENOUS at 13:00

## 2020-01-01 RX ADMIN — IBUPROFEN 800 MG: 800 TABLET, FILM COATED ORAL at 12:02

## 2020-01-01 RX ADMIN — METOCLOPRAMIDE 10 MG: 5 INJECTION, SOLUTION INTRAMUSCULAR; INTRAVENOUS at 10:33

## 2020-01-01 RX ADMIN — BUPRENORPHINE AND NALOXONE 2 FILM: 2; .5 FILM BUCCAL; SUBLINGUAL at 09:26

## 2020-01-01 RX ADMIN — CYANOCOBALAMIN TAB 1000 MCG 500 MCG: 1000 TAB at 21:29

## 2020-01-01 RX ADMIN — MORPHINE SULFATE 5 MG: 10 INJECTION INTRAVENOUS at 10:39

## 2020-01-01 RX ADMIN — MULTIPLE VITAMINS W/ MINERALS TAB 1 TABLET: TAB at 20:15

## 2020-01-01 RX ADMIN — PANTOPRAZOLE SODIUM 40 MG: 40 TABLET, DELAYED RELEASE ORAL at 06:07

## 2020-01-01 RX ADMIN — ENOXAPARIN SODIUM 70 MG: 80 INJECTION SUBCUTANEOUS at 09:45

## 2020-01-01 RX ADMIN — SODIUM CHLORIDE 1000 ML: 9 INJECTION, SOLUTION INTRAVENOUS at 14:09

## 2020-01-01 RX ADMIN — PROPOFOL 50 MCG/KG/MIN: 10 INJECTION, EMULSION INTRAVENOUS at 13:41

## 2020-01-01 RX ADMIN — Medication 10 ML: at 08:40

## 2020-01-01 RX ADMIN — VANCOMYCIN HYDROCHLORIDE 1000 MG: 1 INJECTION, POWDER, LYOPHILIZED, FOR SOLUTION INTRAVENOUS at 15:33

## 2020-01-01 RX ADMIN — DEXAMETHASONE SODIUM PHOSPHATE 20 MG: 4 INJECTION, SOLUTION INTRA-ARTICULAR; INTRALESIONAL; INTRAMUSCULAR; INTRAVENOUS; SOFT TISSUE at 09:18

## 2020-01-01 RX ADMIN — Medication 10 ML: at 09:27

## 2020-01-01 RX ADMIN — APIXABAN 5 MG: 5 TABLET, FILM COATED ORAL at 20:37

## 2020-01-01 RX ADMIN — DEXAMETHASONE SODIUM PHOSPHATE 20 MG: 4 INJECTION, SOLUTION INTRA-ARTICULAR; INTRALESIONAL; INTRAMUSCULAR; INTRAVENOUS; SOFT TISSUE at 10:54

## 2020-01-01 RX ADMIN — DEXAMETHASONE SODIUM PHOSPHATE 20 MG: 4 INJECTION, SOLUTION INTRA-ARTICULAR; INTRALESIONAL; INTRAMUSCULAR; INTRAVENOUS; SOFT TISSUE at 09:43

## 2020-01-01 RX ADMIN — FOLIC ACID 1 MG: 1 TABLET ORAL at 09:27

## 2020-01-01 RX ADMIN — PROPOFOL 80 MCG/KG/MIN: 10 INJECTION, EMULSION INTRAVENOUS at 06:50

## 2020-01-01 RX ADMIN — FAMOTIDINE 20 MG: 10 INJECTION, SOLUTION INTRAVENOUS at 08:23

## 2020-01-01 RX ADMIN — OYSTER SHELL CALCIUM WITH VITAMIN D 1 TABLET: 500; 200 TABLET, FILM COATED ORAL at 21:08

## 2020-01-01 RX ADMIN — PROMETHAZINE HYDROCHLORIDE 25 MG: 25 TABLET ORAL at 21:09

## 2020-01-01 RX ADMIN — FAMOTIDINE 20 MG: 10 INJECTION, SOLUTION INTRAVENOUS at 21:00

## 2020-01-01 RX ADMIN — FOLIC ACID 1 MG: 1 TABLET ORAL at 08:40

## 2020-01-01 RX ADMIN — ENOXAPARIN SODIUM 70 MG: 80 INJECTION SUBCUTANEOUS at 07:56

## 2020-01-01 RX ADMIN — LORAZEPAM 0.5 MG: 2 INJECTION INTRAMUSCULAR; INTRAVENOUS at 21:59

## 2020-01-01 RX ADMIN — OYSTER SHELL CALCIUM WITH VITAMIN D 1 TABLET: 500; 200 TABLET, FILM COATED ORAL at 20:14

## 2020-01-01 RX ADMIN — REMDESIVIR 100 MG: 100 INJECTION, POWDER, LYOPHILIZED, FOR SOLUTION INTRAVENOUS at 12:02

## 2020-01-01 RX ADMIN — METRONIDAZOLE 500 MG: 500 INJECTION, SOLUTION INTRAVENOUS at 03:40

## 2020-01-01 RX ADMIN — FAMOTIDINE 20 MG: 10 INJECTION, SOLUTION INTRAVENOUS at 09:45

## 2020-01-01 RX ADMIN — BUSPIRONE HYDROCHLORIDE 5 MG: 5 TABLET ORAL at 20:16

## 2020-01-01 RX ADMIN — FOLIC ACID 1 MG: 1 TABLET ORAL at 21:01

## 2020-01-01 RX ADMIN — LINEZOLID 600 MG: 600 INJECTION, SOLUTION INTRAVENOUS at 06:20

## 2020-01-01 RX ADMIN — IBUPROFEN 800 MG: 800 TABLET, FILM COATED ORAL at 08:27

## 2020-01-01 RX ADMIN — PROPOFOL 20 MCG/KG/MIN: 10 INJECTION, EMULSION INTRAVENOUS at 21:41

## 2020-01-01 RX ADMIN — FOLIC ACID 1 MG: 1 TABLET ORAL at 09:31

## 2020-01-01 RX ADMIN — Medication 20 MEQ: at 10:53

## 2020-01-01 RX ADMIN — SODIUM CHLORIDE: 4.5 INJECTION, SOLUTION INTRAVENOUS at 11:31

## 2020-01-01 RX ADMIN — DEXAMETHASONE SODIUM PHOSPHATE 10 MG: 4 INJECTION, SOLUTION INTRA-ARTICULAR; INTRALESIONAL; INTRAMUSCULAR; INTRAVENOUS; SOFT TISSUE at 11:04

## 2020-01-01 RX ADMIN — Medication 100 MCG/HR: at 13:39

## 2020-01-01 RX ADMIN — METRONIDAZOLE 500 MG: 500 INJECTION, SOLUTION INTRAVENOUS at 21:29

## 2020-01-01 RX ADMIN — DEXAMETHASONE SODIUM PHOSPHATE 20 MG: 4 INJECTION, SOLUTION INTRA-ARTICULAR; INTRALESIONAL; INTRAMUSCULAR; INTRAVENOUS; SOFT TISSUE at 19:00

## 2020-01-01 RX ADMIN — DEXTROSE MONOHYDRATE: 50 INJECTION, SOLUTION INTRAVENOUS at 10:48

## 2020-01-01 RX ADMIN — DEXAMETHASONE SODIUM PHOSPHATE 10 MG: 10 INJECTION, SOLUTION INTRAMUSCULAR; INTRAVENOUS at 12:49

## 2020-01-01 RX ADMIN — FOLIC ACID 1 MG: 1 TABLET ORAL at 21:10

## 2020-01-01 RX ADMIN — MEROPENEM 1 G: 1 INJECTION, POWDER, FOR SOLUTION INTRAVENOUS at 23:43

## 2020-01-01 RX ADMIN — Medication 10 ML: at 22:05

## 2020-01-01 RX ADMIN — SODIUM CHLORIDE: 4.5 INJECTION, SOLUTION INTRAVENOUS at 02:08

## 2020-01-01 RX ADMIN — HYDROCODONE BITARTRATE AND ACETAMINOPHEN 1 TABLET: 7.5; 325 TABLET ORAL at 01:00

## 2020-01-01 RX ADMIN — REMDESIVIR 200 MG: 100 INJECTION, POWDER, LYOPHILIZED, FOR SOLUTION INTRAVENOUS at 17:57

## 2020-01-01 RX ADMIN — HEPARIN SODIUM 5550 UNITS: 1000 INJECTION INTRAVENOUS; SUBCUTANEOUS at 14:08

## 2020-01-01 RX ADMIN — SODIUM CHLORIDE: 9 INJECTION, SOLUTION INTRAVENOUS at 02:04

## 2020-01-01 RX ADMIN — LORAZEPAM 0.5 MG: 2 INJECTION INTRAMUSCULAR; INTRAVENOUS at 10:45

## 2020-01-01 RX ADMIN — PROPOFOL 80 MCG/KG/MIN: 10 INJECTION, EMULSION INTRAVENOUS at 12:52

## 2020-01-01 RX ADMIN — FAMOTIDINE 20 MG: 10 INJECTION, SOLUTION INTRAVENOUS at 21:29

## 2020-01-01 RX ADMIN — IOPAMIDOL 75 ML: 755 INJECTION, SOLUTION INTRAVENOUS at 12:04

## 2020-01-01 RX ADMIN — VANCOMYCIN HYDROCHLORIDE 750 MG: 750 INJECTION, POWDER, LYOPHILIZED, FOR SOLUTION INTRAVENOUS at 12:30

## 2020-01-01 RX ADMIN — LOPERAMIDE HYDROCHLORIDE 2 MG: 2 CAPSULE ORAL at 08:24

## 2020-01-01 RX ADMIN — SODIUM CHLORIDE: 9 INJECTION, SOLUTION INTRAVENOUS at 20:53

## 2020-01-01 RX ADMIN — Medication 4 MCG/MIN: at 10:46

## 2020-01-01 RX ADMIN — CEFEPIME HYDROCHLORIDE 2 G: 2 INJECTION, POWDER, FOR SOLUTION INTRAVENOUS at 14:44

## 2020-01-01 RX ADMIN — VANCOMYCIN HYDROCHLORIDE 750 MG: 750 INJECTION, POWDER, LYOPHILIZED, FOR SOLUTION INTRAVENOUS at 16:14

## 2020-01-01 RX ADMIN — FAMOTIDINE 20 MG: 10 INJECTION, SOLUTION INTRAVENOUS at 08:00

## 2020-01-01 RX ADMIN — QUETIAPINE FUMARATE 25 MG: 25 TABLET ORAL at 20:37

## 2020-01-01 RX ADMIN — NALOXONE HYDROCHLORIDE 2 MG: 1 INJECTION PARENTERAL at 09:06

## 2020-01-01 ASSESSMENT — PAIN SCALES - GENERAL
PAINLEVEL_OUTOF10: 0
PAINLEVEL_OUTOF10: 3
PAINLEVEL_OUTOF10: 0
PAINLEVEL_OUTOF10: 8
PAINLEVEL_OUTOF10: 0
PAINLEVEL_OUTOF10: 4
PAINLEVEL_OUTOF10: 8
PAINLEVEL_OUTOF10: 0
PAINLEVEL_OUTOF10: 7
PAINLEVEL_OUTOF10: 2
PAINLEVEL_OUTOF10: 0
PAINLEVEL_OUTOF10: 10
PAINLEVEL_OUTOF10: 0
PAINLEVEL_OUTOF10: 2
PAINLEVEL_OUTOF10: 0
PAINLEVEL_OUTOF10: 6
PAINLEVEL_OUTOF10: 1
PAINLEVEL_OUTOF10: 0
PAINLEVEL_OUTOF10: 7
PAINLEVEL_OUTOF10: 7
PAINLEVEL_OUTOF10: 0
PAINLEVEL_OUTOF10: 8
PAINLEVEL_OUTOF10: 9
PAINLEVEL_OUTOF10: 0
PAINLEVEL_OUTOF10: 2
PAINLEVEL_OUTOF10: 0
PAINLEVEL_OUTOF10: 7
PAINLEVEL_OUTOF10: 0
PAINLEVEL_OUTOF10: 3
PAINLEVEL_OUTOF10: 8
PAINLEVEL_OUTOF10: 0
PAINLEVEL_OUTOF10: 3
PAINLEVEL_OUTOF10: 0
PAINLEVEL_OUTOF10: 3
PAINLEVEL_OUTOF10: 7
PAINLEVEL_OUTOF10: 9
PAINLEVEL_OUTOF10: 0
PAINLEVEL_OUTOF10: 0
PAINLEVEL_OUTOF10: 3
PAINLEVEL_OUTOF10: 0
PAINLEVEL_OUTOF10: 7
PAINLEVEL_OUTOF10: 0
PAINLEVEL_OUTOF10: 8
PAINLEVEL_OUTOF10: 0
PAINLEVEL_OUTOF10: 10
PAINLEVEL_OUTOF10: 0
PAINLEVEL_OUTOF10: 3
PAINLEVEL_OUTOF10: 0
PAINLEVEL_OUTOF10: 6
PAINLEVEL_OUTOF10: 10
PAINLEVEL_OUTOF10: 0
PAINLEVEL_OUTOF10: 0
PAINLEVEL_OUTOF10: 10
PAINLEVEL_OUTOF10: 0

## 2020-01-01 ASSESSMENT — PULMONARY FUNCTION TESTS
PIF_VALUE: 16
PIF_VALUE: 31
PIF_VALUE: 25
PIF_VALUE: 23
PIF_VALUE: 30
PIF_VALUE: 24
PIF_VALUE: 26
PIF_VALUE: 24
PIF_VALUE: 18
PIF_VALUE: 24
PIF_VALUE: 24
PIF_VALUE: 17
PIF_VALUE: 24
PIF_VALUE: 33
PIF_VALUE: 18
PIF_VALUE: 26
PIF_VALUE: 17
PIF_VALUE: 16
PIF_VALUE: 29
PIF_VALUE: 30
PIF_VALUE: 32
PIF_VALUE: 25
PIF_VALUE: 27
PIF_VALUE: 20

## 2020-01-01 ASSESSMENT — PAIN DESCRIPTION - PROGRESSION
CLINICAL_PROGRESSION: NOT CHANGED
CLINICAL_PROGRESSION: GRADUALLY WORSENING
CLINICAL_PROGRESSION: NOT CHANGED
CLINICAL_PROGRESSION: NOT CHANGED
CLINICAL_PROGRESSION: GRADUALLY IMPROVING

## 2020-01-01 ASSESSMENT — PAIN DESCRIPTION - LOCATION
LOCATION: BUTTOCKS;COCCYX
LOCATION: BUTTOCKS
LOCATION: BACK
LOCATION: LEG
LOCATION: BACK;NECK;LEG
LOCATION: BACK;NECK
LOCATION: BACK;NECK
LOCATION: BACK;BUTTOCKS
LOCATION: BACK;NECK
LOCATION: BACK;NECK
LOCATION: BACK
LOCATION: LEG

## 2020-01-01 ASSESSMENT — ENCOUNTER SYMPTOMS
TROUBLE SWALLOWING: 0
ABDOMINAL PAIN: 0
NAUSEA: 0
TROUBLE SWALLOWING: 0
COLOR CHANGE: 0
STRIDOR: 0
EYE DISCHARGE: 0
COUGH: 1
DIARRHEA: 0
EYE DISCHARGE: 0
COUGH: 1
RHINORRHEA: 0
NAUSEA: 0
COUGH: 0
RHINORRHEA: 0
STRIDOR: 0
VOMITING: 0
FACIAL SWELLING: 0
CHOKING: 0
COLOR CHANGE: 0
CHOKING: 0
PHOTOPHOBIA: 0
FACIAL SWELLING: 0
PHOTOPHOBIA: 0
ABDOMINAL PAIN: 0
APNEA: 0
RHINORRHEA: 0
SHORTNESS OF BREATH: 1
EYE REDNESS: 0
APNEA: 0
NAUSEA: 0
BLOOD IN STOOL: 0
SHORTNESS OF BREATH: 0
BLOOD IN STOOL: 0
ABDOMINAL PAIN: 0
EYE REDNESS: 0
SHORTNESS OF BREATH: 1

## 2020-01-01 ASSESSMENT — PAIN DESCRIPTION - DESCRIPTORS
DESCRIPTORS: SHARP
DESCRIPTORS: ACHING;SHARP
DESCRIPTORS: SHARP
DESCRIPTORS: DULL
DESCRIPTORS: DULL

## 2020-01-01 ASSESSMENT — PAIN DESCRIPTION - PAIN TYPE
TYPE: ACUTE PAIN
TYPE: ACUTE PAIN;CHRONIC PAIN
TYPE: ACUTE PAIN
TYPE: CHRONIC PAIN
TYPE: CHRONIC PAIN
TYPE: ACUTE PAIN
TYPE: CHRONIC PAIN

## 2020-01-01 ASSESSMENT — PAIN DESCRIPTION - ORIENTATION
ORIENTATION: LEFT
ORIENTATION: MID
ORIENTATION: LEFT
ORIENTATION: LOWER
ORIENTATION: MID

## 2020-01-01 ASSESSMENT — PAIN DESCRIPTION - FREQUENCY
FREQUENCY: CONTINUOUS
FREQUENCY: INTERMITTENT
FREQUENCY: OTHER (COMMENT)
FREQUENCY: INTERMITTENT
FREQUENCY: CONTINUOUS
FREQUENCY: OTHER (COMMENT)

## 2020-01-01 ASSESSMENT — PAIN DESCRIPTION - ONSET
ONSET: ON-GOING
ONSET: ON-GOING
ONSET: AWAKENED FROM SLEEP
ONSET: ON-GOING
ONSET: ON-GOING
ONSET: GRADUAL
ONSET: ON-GOING

## 2020-01-01 ASSESSMENT — PAIN - FUNCTIONAL ASSESSMENT: PAIN_FUNCTIONAL_ASSESSMENT: PREVENTS OR INTERFERES WITH MANY ACTIVE NOT PASSIVE ACTIVITIES

## 2020-09-18 PROBLEM — J96.00 ACUTE RESPIRATORY FAILURE (HCC): Status: ACTIVE | Noted: 2020-01-01

## 2020-09-18 NOTE — CARE COORDINATION
Discharge Planning Assessment     discharge planner met with patient to discuss reason for admission, current living situation, and potential needs at the time of discharge. Pt in ED d/t multiple subsegmental pulmonary emboli    Demographics/Insurance verified:  Yes    Current type of dwelling:  Pt came from [facility]. Terry stated she was there for an ankle fracture. Patient from ECF/ confirmed with:  Chelsey Velasquez in admissions    Tentative discharge plan:  Back to [facility] most likely for continued rehab. Terry stated pt will need new evals and precert. Sent hospitalist a message informing of PT/OT orders needed. Transportation at the time of discharge:  Pt will need assistance back to facility.     Electronically signed by TOM Navarro LSW on 9/18/2020 at 2:59 PM

## 2020-09-18 NOTE — ED NOTES
20mg etomodate 1400  69mg rocc 1400    Dr. Abdi Quiñones intubation at 1401   7.5 tube  24 at lip  Positive color change  Pulling back tube 1cm  23 at lip 1402  Bilateral breath sounds noted, no sounds over stomach.    VSS at this time       Rosalba Evans RN  09/18/20 1402

## 2020-09-18 NOTE — ED NOTES
Patient is awake and able to follow commands, mother at bedside, patient knows not to pull tube or any line, nods yes to this RN when giving education on tube and importance of not pulling.       Micheal Peña RN  09/18/20 0353

## 2020-09-18 NOTE — ED NOTES
Pt arouses to sternal rub, the falls back to sleep. pt mother comes  at bed side states pt was okay last night. . Pt was found unresponsive at [facility] this am. EMS reports [place]  gave Narcan 2 mg per nasal Pt noted with redness and blister to right ankle. .pt mother reports previous fracture to right foot. Right foot drop noted. Pt is pale, skin is warm and dry. PT afebrile upon arrival. EMS place PIV to to right ac. Pplaced rasheed cathter, patent with dark yellow urine, scant amount, purlent urine also noted. Pt placed on continuous pulse oximetry and telemetry monitoring. Pt placed on cycling blood pressure. Fall risk precautions in place, call light in reach, bed side table within reach, bed alarm on, will continue to monitor.        Nataliya Tierney RN  09/18/20 3662

## 2020-09-18 NOTE — ED NOTES
Dr. Ila Coker preparing with respiratory for intubation     Mini Simpson, RN  09/18/20 2917 Angelica Crockett RN  09/18/20 2342

## 2020-09-18 NOTE — ED NOTES
Bed: 02  Expected date:   Expected time:   Means of arrival:   Comments:  Bed 6 please     Dalton Garcia RN  09/18/20 6463

## 2020-09-18 NOTE — ED NOTES
Bed: 06  Expected date:   Expected time:   Means of arrival:   Comments:  naman Brown  09/18/20 1053

## 2020-09-18 NOTE — ED PROVIDER NOTES
[facility]        Pt Name: Yemi Lowery  MRN: [id number]  [place] 1975  Date of evaluation: 9/18/2020  Provider: TORI Moran CNP  PCP: Referring Not In System (Inactive)     I have seen and evaluated this patient with my supervising physician Dr. Mitch Cutler   Patient presents with    Altered Mental Status     pt brought in by [facility], pt was found unresponsive justin [place], pt was given Narcan 2mg nasal x1. pt is alert upon arrival.        HISTORY OF PRESENT ILLNESS   (Location, Timing/Onset, Context/Setting, Quality, Duration, Modifying Factors, Severity, Associated Signs and Symptoms)  Note limiting factors. Yemi Lowery is a 50 y.o. 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 Patient was sent from [facility] as she was more drowsy and lethargic than normal.  Said patient is usually more awake and alert and conversive. She did have a recent leg fracture and therefore has a boot in place and ambulates in a wheelchair. Denies any recent trauma, accidents, injuries, or falls. Appears patient does have a history of anemia and frequently gets iron infusions. Patient denies any associated chest pain, cough, fever, nausea, vomiting, diarrhea, headache, neck pain, or back pain. Nursing Notes were all reviewed and agreed with or any disagreements were addressed in the HPI.     Review of medical records:  [address]  [facility] [address]  Dept: [phone]    Follow-up Note    Primary Physician: Alexis Rendon DO     Reason for Visit: Follow-up (weak, dizzy,blurred vision) and pancytopenia      Encounter Diagnoses:   1. Pancytopenia ([address]     Treatment History:  S/p gastric bypass  IV Iron  Vitamin B12 injections  Copper infusions    Interval History:   Patient today presents for follow-up. Patient currently resides in a rehabilitation facility. Patient was recently hospitalized. Patient was admitted to [facility] on 7/31/24 left ankle pain and was found to have a hemoglobin of 2.3. Due to severe anemia patient was transferred to ICU at [facility]. Patient was transfused a total of 4 units of packed red cells and 2 units of FFP. Her hemoglobin stabilized around 8. Patient was given Venofer for 300 mg ×3 doses. Patient was discharged on 8/5/2020. Patient has a long history of pancytopenia secondary to gastric bypass. She has a long-standing history of chronic iron deficiency, vitamin B12 deficiency and copper insisting deficiency. Patient today presents for follow-up. She arrives in a wheelchair. She is wearing and left ankle brace. Patient states that she is currently residing in the rehabilitation facility. Patient states that she feels fatigued. She denies any tingling in her hands or feet. She has not have any shortness of breath or cough. She denies any bleeding from any site. She has been noncompliant in the past.    Assessment:   1. Chronic pancytopenia: Bone marrow biopsy from 7/17/19 negative for underlying myeloproliferative disease. Pancytopenia is secondary to nutritional deficiency secondary to gastric bypass. Patient unfortunately is noncompliant following up with our team. I had a long discussion with the patient today, emphasizing compliance to avoid repetitive readmissions, vitaliy for anemia and to avoid transfusions. I recommend to check a CBC with this, CMP, folate level, vitamin B12 level, copper and Zinc today. If deficient, we will replace. Patient states that she will follow up with us in 1 week to review her blood work.   2. Iron ALPRAZOLAM (XANAX) 1 MG TABLET    Take 1 mg by mouth nightly as needed for Sleep    ASCORBIC ACID (VITAMIN C) 500 MG CAPS    Take 500 mg by mouth daily    BUPRENORPHINE HCL-NALOXONE HCL (BUNAVAIL) 6.3-1 MG FILM    Place 1 strip inside cheek daily    BUSPIRONE (BUSPAR) 5 MG TABLET    Take 5 mg by mouth 3 times daily    BUTALBITAL-ACETAMINOPHEN-CAFFEINE (FIORICET, ESGIC) -40 MG PER TABLET    Take 2 tablets by mouth 2 times daily    CALCIUM CARB-CHOLECALCIFEROL (CALCIUM 600-D PO)    Take by mouth    CHOLECALCIFEROL (VITAMIN D3) 63406 UNITS CAPS    Take 1 capsule by mouth every 7 days. CLONIDINE (CATAPRES) 0.1 MG TABLET    Take 0.1 mg by mouth every evening Takes 1/2 of a tablet    CUPRIC CHLORIDE (COPPER) 0.4 MG/ML INJECTION    Infuse 5 mLs intravenously once a week for 5 doses    CYANOCOBALAMIN 1000 MCG/ML INJECTION        CYANOCOBALAMIN 1000 MCG/ML INJECTION    Inject 1 mL into the skin every 30 days    FLUOXETINE (PROZAC) 20 MG CAPSULE    Take 20 mg by mouth nightly    FOLIC ACID (FOLVITE) 1 MG TABLET    Take 1 mg by mouth daily. FUROSEMIDE (LASIX) 20 MG TABLET    Take 20 mg by mouth daily    IBUPROFEN (ADVIL;MOTRIN) 800 MG TABLET    Take 800 mg by mouth every 6 hours as needed for Pain    LISINOPRIL (PRINIVIL;ZESTRIL) 20 MG TABLET    Take 20 mg by mouth daily    MAGNESIUM OXIDE (MAG-OX) 400 (241.3 MG) MG TABS TABLET    Take 1 tablet by mouth daily    MULTIPLE VITAMINS-MINERALS (THERAPEUTIC MULTIVITAMIN-MINERALS) TABLET    Take 1 tablet by mouth daily    PANTOPRAZOLE (PROTONIX) 40 MG TABLET    Take 40 mg by mouth daily    POTASSIUM (POTASSIMIN PO)    Take by mouth    POTASSIUM CHLORIDE SA (K-DUR;KLOR-CON M) 10 MEQ TABLET    Take 1 tablet by mouth daily for 7 days. POTASSIUM CHLORIDE SA (K-DUR;KLOR-CON M) 10 MEQ TABLET    Take 10 mEq by mouth daily.     PROMETHAZINE (PHENERGAN) 25 MG TABLET    Take 0.5-1 tablets by mouth every 6 hours as needed for Nausea    QUETIAPINE (SEROQUEL) 200 MG TABLET    Take 150 mg by mouth nightly     SUMATRIPTAN (IMITREX) 25 MG TABLET        TOPIRAMATE (TOPAMAX) 100 MG TABLET    Take 100 mg by mouth 2 times daily         ALLERGIES     Acetaminophen; Codeine; Morphine; Penicillins; Morphine and related; and Ondansetron hcl    FAMILYHISTORY       Family History   Problem Relation Age of Onset    Dementia Mother     High Blood Pressure Mother     Thyroid Cancer Mother     Heart Disease Father     Stroke Father     Heart Attack Father     Thyroid Cancer Maternal Grandmother     High Blood Pressure Maternal Grandmother     Heart Disease Maternal Grandmother     Lung Cancer Paternal Grandfather           SOCIAL HISTORY       Social History     Tobacco Use    Smoking status: Former Smoker     Packs/day: 1.00     Types: Cigarettes    Smokeless tobacco: Never Used   Substance Use Topics    Alcohol use: No    Drug use: No       SCREENINGS             PHYSICAL EXAM    (up to 7 for level 4, 8 or more for level 5)     ED Triage Vitals [09/18/20 1100]   Enc Vitals Group      /77      Pulse 115      Resp 18      Temp 98.3 °F (36.8 °C)      Temp Source Oral      SpO2 (!) 89 %      Weight 153 lb (69.4 kg)      Height 5' 7\" (1.702 m)         Physical Exam  Vitals signs and nursing note reviewed. Constitutional:       General: She is sleeping. Appearance: Normal appearance. She is well-developed and normal weight. She is ill-appearing. HENT:      Head: Normocephalic and atraumatic. Nose: Nose normal.      Mouth/Throat:      Mouth: Mucous membranes are dry. Eyes:      General:         Right eye: No discharge. Left eye: No discharge. Extraocular Movements: Extraocular movements intact. Pupils: Pupils are equal, round, and reactive to light. Neck:      Musculoskeletal: Normal range of motion. Cardiovascular:      Rate and Rhythm: Normal rate and regular rhythm. Heart sounds: Normal heart sounds.    Pulmonary:      Effort: Pulmonary effort is normal. Tachypnea present. No respiratory distress. Breath sounds: Examination of the right-lower field reveals decreased breath sounds. Examination of the left-lower field reveals decreased breath sounds. Decreased breath sounds present. Chest:       Abdominal:      General: Bowel sounds are normal.      Palpations: Abdomen is soft. Tenderness: There is no abdominal tenderness. Musculoskeletal: Normal range of motion. Skin:     General: Skin is warm and dry. Coloration: Skin is not pale. Neurological:      General: No focal deficit present. Sensory: Sensation is intact. Comments: Patient will awake with verbal stimuli and then falls back asleep. She has difficulty staying awake to follow commands. Psychiatric:         Behavior: Behavior normal. Behavior is cooperative.          DIAGNOSTIC RESULTS   LABS:    Labs Reviewed   CBC WITH AUTO DIFFERENTIAL - Abnormal; Notable for the following components:       Result Value    RBC 3.46 (*)     Hemoglobin 11.2 (*)     Hematocrit 34.5 (*)     Lymphocytes Absolute 0.4 (*)     All other components within normal limits    Narrative:     Performed at:  [facility] [address], [place], [address] [employer]   Phone [phone]   COMPREHENSIVE METABOLIC PANEL W/ REFLEX TO MG FOR LOW K - Abnormal; Notable for the following components:    Sodium 135 (*)     Chloride 96 (*)     Glucose 114 (*)     BUN 21 (*)     CREATININE <0.5 (*)     Total Bilirubin 1.2 (*)     All other components within normal limits    Narrative:     Performed at:  [facility] [address], [place], [address] [employer]   Phone [phone]   BRAIN NATRIURETIC PEPTIDE - Abnormal; Notable for the following components:    Pro- (*)     All other components within normal limits    Narrative:     Performed at:  [facility] [address], [place], [address] [employer]   Phone [phone] URINE RT REFLEX TO CULTURE - Abnormal; Notable for the following components:    Clarity, UA CLOUDY (*)     Protein, UA 30 (*)     Urobilinogen, Urine 4.0 (*)     All other components within normal limits    Narrative:     Performed at:  [facility] [address], [place], [address] [employer]   Phone [phone]   BLOOD GAS, ARTERIAL - Abnormal; Notable for the following components:    pH, Arterial 7.191 (*)     pCO2, Arterial 97.7 (*)     pO2, Arterial 131.0 (*)     HCO3, Arterial 37.4 (*)     Base Excess, Arterial 6.4 (*)     Hemoglobin, Art, Extended 11.2 (*)     Carboxyhgb, Arterial 3.7 (*)     All other components within normal limits    Narrative:     CALL  [facility] tel. [phone],  Chemistry results called to and read back by Sundar Obrien, 09/18/2020 12:54,  by [facility]   Performed at:  [facility] [address], [place], [address] [employer]   Phone [phone]   ACETAMINOPHEN LEVEL - Abnormal; Notable for the following components:    Acetaminophen Level <5 (*)     All other components within normal limits    Narrative:     Performed at:  [facility] [address], [place], [address] [employer]   Phone [phone]   SALICYLATE LEVEL - Abnormal; Notable for the following components:    Salicylate, Serum <3.3 (*)     All other components within normal limits    Narrative:     Performed at:  [facility] [address], [place], [address] [employer]   Phone [phone]   MICROSCOPIC URINALYSIS - Abnormal; Notable for the following components:    Hyaline Casts, UA 3-5 (*)     WBC, UA 10-20 (*)     Epithelial Cells, UA 6-10 (*)     Bacteria, UA 3+ (*)     All other components within normal limits    Narrative:     Performed at:  [facility] [address], [place], [address] [employer]   Phone [phone]   CULTURE, BLOOD 2   CULTURE, BLOOD 1   CULTURE, URINE   LIPASE Narrative:     Performed at:  [facility] [address]  [place], [address]   Phone [phone]   PROTIME-INR    Narrative:     Performed at:  [facility] [address] [place], [address]   Phone [phone]   TROPONIN    Narrative:     Performed at:  [facility] [address] [place]  [place], [address]   Phone [phone]   LACTIC ACID, PLASMA    Narrative:     Performed at:  [facility] [address]  [place], [address]   Phone [phone]   URINE DRUG SCREEN    Narrative:     Performed at:  [facility] [address]  [place], [address]   Phone [phone]   ETHANOL    Narrative:     Performed at:  [facility] [address]  [place], [address]   Phone [phone]   HCG, SERUM, QUALITATIVE    Narrative:     Performed at:  [facility] [address]  [place], [address]   Phone [phone]   APTT    Narrative:     Performed at:  [facility] [address]  [place], [address]   Phone [phone]   IRON AND TIBC   TYPE AND SCREEN    Narrative:     Performed at:  [facility] [address]  [place], [address]   Phone [phone]   ANTIBODY SCREEN    Narrative:     Performed at:  [facility] [address] [place] [employer], [address]   Phone [phone]       All other labs were within normal range or not returned as of this dictation. EKG: All EKG's are interpreted by the Emergency Department Physician in the absence of a cardiologist.  Please see their note for interpretation of EKG.       RADIOLOGY:   Non-plain film images such as CT, Ultrasound and MRI are read by the radiologist. Plain radiographic images are visualized and propofol injection 10 mg (has no administration in time range)   heparin (porcine) injection 5,550 Units (5,550 Units Intravenous Given 9/18/20 1408)   etomidate (AMIDATE) injection 20.8 mg (20.8 mg Intravenous Given 9/18/20 1404)   rocuronium (ZEMURON) injection 69 mg (69 mg Intravenous Given 9/18/20 1404)           Pertinent Labs & Imaging studies reviewed. (See chart for details)   -  Patient seen and evaluated in the emergency department. -  Triage and nursing notes reviewed and incorporated. -  Old chart records reviewed and incorporated. -  Patient case discussed with attending physician, Dr. Halmia Mccrary. They saw and examined patient. -  Differential diagnosis includes:  CVA, TIA, drug overdose, alcohol intoxication, dehydration, UTI, encephalopathy, MI, COPD, CHF, seizure, tumor, SAH, SDH, ICH, PE, pneumonia, verse COVID-19  -  Work-up included:  See above CBC, CMP, lipase, INR, PT, BNP, troponin, UA, ABG, lactic acid, UDS, ethanol level, acetaminophen level, salicylate level, hCG, CXR, CT head without, CT chest pulmonary embolism, COVID-19, APTT, blood culture x2, iron, TIBC, type and screen  -  ED treatment included:   Normal saline, Levaquin, cefepime, heparin high-dose bolus and drip  - Consults: Hospitalist  -  Results discussed with patient. Labs show  UA shows cloudy clarity, 30 protein, 4 urobilinogen, 3-5 hyaline casts, 10-20 WBC, 3+ bacteria, 6-10 epithelial cells. UDS is unremarkable. ABG with pH arterial 7.91, PCO2 97.7, PO2 131, bicarb 37.4. CBC with RBC 3.46, hemoglobin 11.2 hematocrit 34.5. CMP sodium 135, chloride 96, glucose 114, BUN 21 with creatinine less than 0.5. Lipase 19. Type and screen A+. Pro time 11.2, INR 0.97. . Troponin negative. Lactic acid 0.5. Ethanol level nondetected. Acetaminophen level less than 5. hCG negative. APTT 26.6. Blood culture x2 pending. COVID-19 swab pending at time of admission.   CXR shows new left basilar opacity could represent atelectasis or left lower lobe pneumonia. CT head shows no acute intracranial abnormality. CT chest with contrast shows multiple right sided pulmonary emboli. Bibasilar segmental atelectasis versus pneumonia. The patient is agreeable with plan of care and disposition.  -  Disposition:   Admission    CRITICAL CARE TIME   Total Critical Care time was 38 minutes, excluding separately reportable procedures. There was a high probability of clinically significant/life threatening deterioration in the patient's condition which required my urgent intervention. FINAL IMPRESSION      1. Multiple subsegmental pulmonary emboli without acute cor pulmonale    2. Altered mental status, unspecified altered mental status type    3. Respiratory acidosis    4. Pneumonia due to organism    5.  Hypercarbia          DISPOSITION/PLAN   DISPOSITION    Admission         (Please note that portions of this note were completed with a voice recognition program.  Efforts were made to edit the dictations but occasionally words are mis-transcribed.)    TORI Ley CNP (electronically signed)            TORI Ley CNP  09/18/20 1201 [facility], APRN - CNP  09/18/20 9521

## 2020-09-18 NOTE — ED NOTES
Placed meplilex to right ankle. Pt has no open wound on her buttock or coccyx.       Julissa Connell RN  09/18/20 9440

## 2020-09-18 NOTE — PROGRESS NOTES
Pt admitted to [address], awake and alert, following commands, oriented x4 (yes/no questions), follows commands, SPO2 100% on spontaneous, PS 10/5, 40% FiO2, achieving TV tidal volumes in the 600s with a respiratory rate of 14 breaths per minute, bradypnea at time, low dose Propofol stopped. HR 89, /89. Lung sounds clear throughout. 1800: Talked to Dr. Catalina Trivedi, discussed case, approval to extubate to nasal cannula and continue heparin gtt (see MAR). 1815: Extubated per RT, suctioned, placed on 3L of O2 via nasal cannula, tolerating well, SPO2 100%, RR 16, HR 84.     1825: pt talking on phone with her mother, RN updated mother on PoC.  Titrating down O2

## 2020-09-18 NOTE — ED PROVIDER NOTES
I independently performed a history and physical on Mehrdad Merino. All diagnostic, treatment, and disposition decisions were made by myself in conjunction with the advanced practice provider. Briefly, this is a 50 y.o. female here for her mental status. Patient lives in a nursing facility. Majority of history is provided by the mother who states that she talked with the patient on the phone at 5 PM yesterday. Patient was conversational with the mother and able to answer questions normally. She does not ambulate at baseline per mother. Today patient was found unresponsive given 2 mg of Narcan by EMS with some improvement in mental status. She provides minimal history, denies any pain to me. 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 She can tell me her name and answer yes and no. She is moving all 4 extremities spontaneously,squeezes my hands to command. Sinus tachycardia. No rubs. Lungs sound clear bilaterally. Diminished soft and nondistended. Urine is cloudy appearing. EKG  The Ekg interpreted by me in the absence of a cardiologist shows. sinus tachycardia, yngs=169  Axis is   Normal  QTc is  normal  Intervals and Durations are unremarkable. No specific ST-T wave changes appreciated. No evidence of acute ischemia. CT CHEST PULMONARY EMBOLISM W CONTRAST   Final Result   Multiple right-sided pulmonary emboli. Bibasilar segmental atelectasis versus pneumonia. Findings were discussed with Edu Ivey at 1:33 pm on 9/18/2020. CT Head WO Contrast   Final Result   1. No acute intracranial abnormality.          XR CHEST PORTABLE   Final Result   New left basilar opacity could represent atelectasis or left lower lobe pneumonia         XR CHEST 1 VIEW    (Results Pending)     Labs Reviewed   CBC WITH AUTO DIFFERENTIAL - Abnormal; Notable for the following components:       Result Value    RBC 3.46 (*)     Hemoglobin 11.2 (*)     Hematocrit 34.5 (*)     Lymphocytes Absolute 0.4 (*)     All other components within normal limits    Narrative:     Performed at:  [facility] [address] [facility] [place]   Phone [phone]   COMPREHENSIVE METABOLIC PANEL W/ REFLEX TO MG FOR LOW K - Abnormal; Notable for the following components:    Sodium 135 (*)     Chloride 96 (*)     Glucose 114 (*)     BUN 21 (*)     CREATININE <0.5 (*)     Total Bilirubin 1.2 (*)     All other components within normal limits    Narrative:     Performed at:  [facility] [address] [facility] [place]   Phone [phone]   BRAIN NATRIURETIC PEPTIDE - Abnormal; Notable for the following components:    Pro- (*)     All other components within normal limits    Narrative:     Performed at:  [facility] [address] [facility] [place]   Phone [phone]   URINE RT REFLEX TO CULTURE - Abnormal; Notable for the following components:    Clarity, UA CLOUDY (*)     Protein, UA 30 (*)     Urobilinogen, Urine 4.0 (*)     All other components within normal limits    Narrative:     Performed at:  [facility] [address] [facility] [place]   Phone [phone]   BLOOD GAS, ARTERIAL - Abnormal; Notable for the following components:    pH, Arterial 7.191 (*)     pCO2, Arterial 97.7 (*)     pO2, Arterial 131.0 (*)     HCO3, Arterial 37.4 (*)     Base Excess, Arterial 6.4 (*)     Hemoglobin, Art, Extended 11.2 (*)     Carboxyhgb, Arterial 3.7 (*)     All other components within normal limits    Narrative:     CALL  [facility] tel. [phone],  Chemistry results called to and read back by ja monsalve, 09/18/2020 12:54,  by DRE  Performed at:  [facility] [address], [address] [place]   Phone [phone]   ACETAMINOPHEN LEVEL - Abnormal; Notable for the following components:    Acetaminophen Level <5 (*)     All other components within normal limits    Narrative:     Performed at:  [facility] [address], [address] [place]   Phone [phone]   SALICYLATE LEVEL - Abnormal; Notable for the following components:    Salicylate, Serum <4.3 (*)     All other components within normal limits    Narrative:     Performed at:  [facility] [address], [address]   Phone [phone]   MICROSCOPIC URINALYSIS - Abnormal; Notable for the following components:    Hyaline Casts, UA 3-5 (*)     WBC, UA 10-20 (*)     Epithelial Cells, UA 6-10 (*)     Bacteria, UA 3+ (*)     All other components within normal limits    Narrative:     Performed at:  [facility] [address], [address] [place]   Phone [phone]   CULTURE, BLOOD 2   CULTURE, BLOOD 1   CULTURE, URINE   LIPASE    Narrative:     Performed at:  [facility] [address], [address] [place]   Phone [phone]   PROTIME-INR    Narrative:     Performed at:  [facility] [address], [address] [place]   Phone [phone]   TROPONIN    Narrative:     Performed at:  [facility] [address], [address] [place]   Phone [phone]   LACTIC ACID, PLASMA    Narrative:     Performed at:  [facility] [address], [address]   Phone [phone]   URINE DRUG SCREEN    Narrative:     Performed at:  [facility] [address], [address] [place]   Phone [phone]   ETHANOL    Narrative: Performed at:  [facility]  [address], [address]   Phone [phone]   HCG, SERUM, QUALITATIVE    Narrative:     Performed at:  [facility]  [address], [address]   Phone [phone]   IRON AND TIBC   APTT   TYPE AND SCREEN    Narrative:     Performed at:  [facility]  [address], [address]   Phone [phone]   ANTIBODY SCREEN    Narrative:     Performed at:  [facility]  [address],  [place], [address]   Phone [phone]     Medications   0.9 % sodium chloride bolus (1,000 mLs Intravenous New Bag 9/18/20 1409)   iopamidol (ISOVUE-370) 76 % injection 75 mL (has no administration in time range)   cefepime (MAXIPIME) 2 g IVPB minibag (has no administration in time range)     And   levoFLOXacin (LEVAQUIN) 750 MG/150ML infusion 750 mg (has no administration in time range)   heparin (porcine) injection 5,550 Units (has no administration in time range)   heparin (porcine) injection 2,780 Units (has no administration in time range)   heparin 25,000 units in dextrose 5% 250 mL infusion (18 Units/kg/hr × 69.4 kg Intravenous New Bag 9/18/20 1408)   heparin (porcine) injection 5,550 Units (5,550 Units Intravenous Given 9/18/20 1408)   etomidate (AMIDATE) injection 20.8 mg (20.8 mg Intravenous Given 9/18/20 1404)   rocuronium (ZEMURON) injection 69 mg (69 mg Intravenous Given 9/18/20 1404)       PROCEDURE:  ENDOTRACHEAL INTUBATION  The benefits, risks, and alternatives were discussed with Smitha Casey mother . An opportunity to ask questions was provided, and consent was given for the procedure. Smitha Casey was pre-oxygenated as best as possible. Suction was ready. The patient was sedated, then paralyzed; please see the chart for the medications and dosages administered.  The vocal cords were visualized, and the endotracheal tube was inserted without complication. Tracheal position was confirmed using auscultation, capnometry, tube condensation, and chest x-ray. The tube was appropriately secured. Sedation was provided for endotracheal tube and ventilatory comfort. Course and MDM:  Patient is 61-year-old female presenting for altered mental status with last known well yesterday. She is tachycardic and afebrile here. Initially following commands but very sleepy. Did not have pinpoint pupils or respiratory depression here so I did not proceed with additional narcan. Workup shows no ICH. UTI and possible pneumonia were noted thought she does not meet sepsis criteria. Broad spectrum abx were initiated. Multiple PE was found on CT without signs of R heart strain. ABG was concerning for severe hypercarbia, respiratory acidosis. Possibly due to pulmonary clot burden vs seizure, less likely acute opiate  intoxication. I had detailed discussion with patient's mother regarding need for mechanical ventilation due to excessive hypercarbia causing worsening somnolence. Patient would not be appropriate for bipap due to worsening mentation. Mother agreed to proceed with intubation which was performed as above, with plan to hyperventilate on vent. The total Critical Care time is 60 minutes which excludes separately billable procedures. Patient Referrals:  No follow-up provider specified. Discharge Medications:  New Prescriptions    No medications on file       FINAL IMPRESSION  1. Multiple subsegmental pulmonary emboli without acute cor pulmonale    2. Altered mental status, unspecified altered mental status type    3. Respiratory acidosis    4. Pneumonia due to organism    5. Hypercarbia        Blood pressure (!) 162/98, pulse 84, temperature 98.3 °F (36.8 °C), temperature source Oral, resp.  rate 19, height 5' 7\" (1.702 m), weight 153 lb (69.4 kg), last menstrual period 05/17/2014, SpO2 100 %, not currently breastfeeding.      For further details of [facility] emergency department encounter, please see documentation by advanced practice provider        Raven Maddox MD  09/18/20 [address], [place]  09/18/20 [id number]

## 2020-09-18 NOTE — H&P
Yes Historical Provider, MD   pantoprazole (PROTONIX) 40 MG tablet Take 40 mg by mouth daily   Yes Historical Provider, MD   busPIRone (BUSPAR) 5 MG tablet Take 5 mg by mouth 3 times daily   Yes Historical Provider, MD   Calcium Carb-Cholecalciferol (CALCIUM 600-D PO) Take by mouth   Yes Historical Provider, MD   Multiple Vitamins-Minerals (THERAPEUTIC MULTIVITAMIN-MINERALS) tablet Take 1 tablet by mouth daily   Yes Historical Provider, MD   Ascorbic Acid (VITAMIN C) 500 MG CAPS Take 500 mg by mouth daily 2/14/17  Yes Radha Garnett MD   cloNIDine (CATAPRES) 0.1 MG tablet Take 0.1 mg by mouth every evening Takes 1/2 of a tablet   Yes Historical Provider, MD   promethazine (PHENERGAN) 25 MG tablet Take 0.5-1 tablets by mouth every 6 hours as needed for Nausea 2/13/17  Yes Radha Garnett MD   QUEtiapine (SEROQUEL) 200 MG tablet Take 150 mg by mouth nightly    Yes Historical Provider, MD   Buprenorphine HCl-Naloxone HCl (BUNAVAIL) 6.3-1 MG FILM Place 1 strip inside cheek daily   Yes Historical Provider, MD   topiramate (TOPAMAX) 100 MG tablet Take 100 mg by mouth 2 times daily    Historical Provider, MD   cyanocobalamin 1000 MCG/ML injection Inject 1 mL into the skin every 30 days 5/24/16   Radha Garnett MD   lisinopril (PRINIVIL;ZESTRIL) 20 MG tablet Take 20 mg by mouth daily    Historical Provider, MD   FLUoxetine (PROZAC) 20 MG capsule Take 20 mg by mouth nightly    Historical Provider, MD   furosemide (LASIX) 20 MG tablet Take 20 mg by mouth daily    Historical Provider, MD   butalbital-acetaminophen-caffeine (FIORICET, ESGIC) -40 MG per tablet Take 2 tablets by mouth 2 times daily    Historical Provider, MD   magnesium oxide (MAG-OX) 400 (241.3 MG) MG TABS tablet Take 1 tablet by mouth daily 7/21/15   Radha Garnett MD   cyanocobalamin 1000 MCG/ML injection  6/19/15   Historical Provider, MD   SUMAtriptan (IMITREX) 25 MG tablet  6/25/15   Historical Provider, MD   ALPRAZolam Celine Oleksandr) 1 MG tablet Take 1 mg by mouth nightly as needed for Sleep    Historical Provider, MD   Cupric Chloride (COPPER) 0.4 MG/ML injection Infuse 5 mLs intravenously once a week for 5 doses 6/4/15 7/3/15  Gatito Robles MD   Potassium (POTASSIMIN PO) Take by mouth    Historical Provider, MD   Cholecalciferol (VITAMIN D3) 69703 UNITS CAPS Take 1 capsule by mouth every 7 days. Historical Provider, MD   potassium chloride SA (K-DUR;KLOR-CON M) 10 MEQ tablet Take 10 mEq by mouth daily. 5/25/14 9/12/14  Jacques Obrien MD   potassium chloride SA (K-DUR;KLOR-CON M) 10 MEQ tablet Take 1 tablet by mouth daily for 7 days. 5/17/14 5/24/14  Jacques Obrien MD   folic acid (FOLVITE) 1 MG tablet Take 1 mg by mouth daily. Historical Provider, MD       Allergies:  Acetaminophen; Codeine; Morphine; Penicillins; Morphine and related; and Ondansetron hcl    Social History:      The patient currently lives at [facility]    TOBACCO:   reports that she has quit smoking. Her smoking use included cigarettes. She smoked 1.00 pack per day. She has never used smokeless tobacco.  ETOH:   reports no history of alcohol use. E-Cigarettes Vaping or Juuling     Questions Responses    Vaping Use     Start Date     Does device contain nicotine? Quit Date     Vaping Type             Family History:       Reviewed in detail and negative for DM, CAD, Cancer, CVA. Positive as follows:        Problem Relation Age of Onset    Dementia Mother     High Blood Pressure Mother     Thyroid Cancer Mother     Heart Disease Father     Stroke Father     Heart Attack Father     Thyroid Cancer Maternal Grandmother     High Blood Pressure Maternal Grandmother     Heart Disease Maternal Grandmother     Lung Cancer Paternal Grandfather        REVIEW OF SYSTEMS:   Pertinent positives as noted in the HPI. All other systems reviewed and negative. PHYSICAL EXAM PERFORMED:    /83   Pulse 99 Comment: Simultaneous filing. User may not have seen previous data.   Temp 98.3 °F (36.8 °C) (Oral)   Resp 13 Comment: Simultaneous filing. User may not have seen previous data. Ht 5' 7\" (1.702 m)   Wt 153 lb (69.4 kg)   LMP 05/17/2014   SpO2 99% Comment: Simultaneous filing. User may not have seen previous data. BMI 23.96 kg/m²     General appearance:  Appears cachectic  HEENT:  ETT tube, disconjugate gaze  Neck: Supple, with full range of motion. No jugular venous distention. Trachea midline. Respiratory:  Normal respiratory effort. Clear to auscultation, bilaterally without Rales/Wheezes/Rhonchi. Cardiovascular:  tachycardic and normal rhythm with normal S1/S2 without murmurs, rubs or gallops. Abdomen: Soft, non-tender, non-distended with normal bowel sounds. Musculoskeletal:  No clubbing, cyanosis or edema bilaterally. Full range of motion without deformity. Skin: Skin color, texture, turgor normal.  No rashes or lesions. Neurologic:  Unable to assess. She doesn't withdraw feet with light tough. Her right arm appears flaccid but she moves it. Psychiatric: on vent      Labs:     Recent Labs     09/18/20  1200   WBC 7.8   HGB 11.2*   HCT 34.5*        Recent Labs     09/18/20  1200   *   K 4.3   CL 96*   CO2 32   BUN 21*   CREATININE <0.5*   CALCIUM 9.1     Recent Labs     09/18/20  1200   AST 20   ALT 28   BILITOT 1.2*   ALKPHOS 117     Recent Labs     09/18/20  1200   INR 0.97     Recent Labs     09/18/20  1200   TROPONINI <0.01       Urinalysis:      Lab Results   Component Value Date    NITRU Negative 09/18/2020    WBCUA 10-20 09/18/2020    BACTERIA 3+ 09/18/2020    RBCUA 0-2 09/18/2020    BLOODU Negative 09/18/2020    SPECGRAV >=1.030 09/18/2020    GLUCOSEU Negative 09/18/2020       Radiology:     EKG: Sinus tachycardia @101bpm, no ST elevated or depression    CT CHEST PULMONARY EMBOLISM W CONTRAST   Final Result   Multiple right-sided pulmonary emboli. Bibasilar segmental atelectasis versus pneumonia.       Findings were discussed with Marcell Walter at 1:33 pm on 9/18/2020. CT Head WO Contrast   Final Result   1. No acute intracranial abnormality. XR CHEST PORTABLE   Final Result   New left basilar opacity could represent atelectasis or left lower lobe   pneumonia         XR CHEST 1 VIEW    (Results Pending)       ASSESSMENT:    There are no active hospital problems to display for this patient. #Acute Hypercarbic Respiratory failure--concerning for central etiology vs medication related. No hx of COPD or lung disease  -Continue mechanical ventilation  -Consult Pulmonology  -Check MRI for CVA and Consult Neurology. Patient has disconjugate gaze. -Critical Care Consultation    #Acute encephalopathy-due to hypercarbia. s/p narcan x 2. Urine drug tox negative  -Treating hypercarbia  -Holding suboxone,seroquel and other sedating medications  -ruling out CVA    #Right sided PE-due to immobilization. No RV strain on CT.  -Continue heparin gtt  -Novel anticoagulant on discharge  -Check Echocardiogram    #Hx Chronic pancytopenia  -Bone marrow biopsy from 7/17/19 negative for underlying myeloproliferative disease. Pancytopenia is secondary to nutritional deficiency secondary to gastric bypass. #Hx Vitamin B12 deficiency-due to gastric bypass  -She is on monthly b12    #Hx Copper deficiency  -Replacement co-ordinated by [facility]    #Hx Gastric bypass  -Complicated by multiple nutritional deficiencies    #Opiate dependence  -Holding suboxone due to sedation    Hx Left malleolus fracture  -Continue left LE boot    DVT Prophylaxis: Heparin gtt  Diet: No diet orders on file  Code Status: No Order    PT/OT Eval Status: Ordered. Pt is non ambulatory. Dispo - ICU admission       Haresh Nath MD    Thank you Referring Not In System (Inactive) for the opportunity to be involved in this patient's care. If you have any questions or concerns please feel free to contact me at [phone].

## 2020-09-19 NOTE — PROGRESS NOTES
Pharmacy Medication History Note      List of current medications patient is taking is complete. Source of information: Med List from nursing home    Changes made to medication list:    Medications removed (include reason, ex. therapy complete or physician discontinued): Alprazolam- therapy completed  Buprenorphine- wrong dose  Fioricet- therapy completed  Vitamin D3- therapy completed  Copper- therapy completed  Vitamin B12 injection- therapy completed  Fluoxetine- therapy completed  Folic acid- therapy completed  Furosemide- therapy completed  Lisinopril- therapy completed  Magnesium- therapy completed  Potassium- therapy completed  Sumatriptan- therapy completed  Topiramate- therapy completed  Medications added/doses adjusted:  Clonidine- updated sig  Ibuprofen- updated sig  Loperamide  Melatonin  Promethazine- updated sig  Vitamin B12 tablet    Other notes (ex. Recent course of antibiotics, Coumadin dosing):  Denies use of other OTC or herbal medications. Home Medicine Reconciliation:    No current facility-administered medications on file prior to encounter. Current Outpatient Medications on File Prior to Encounter   Medication Sig Dispense Refill    buprenorphine-naloxone (SUBOXONE) 8-2 MG FILM SL film Place 1 Film under the tongue 2 times daily.       vitamin B-12 (CYANOCOBALAMIN) 500 MCG tablet Take 500 mcg by mouth daily      promethazine (PHENERGAN) 25 MG tablet Take 25 mg by mouth every 12 hours      melatonin 3 MG TABS tablet Take 3-6 mg by mouth nightly as needed (Sleep)      loperamide (IMODIUM) 2 MG capsule Take 2 mg by mouth every 12 hours      ibuprofen (ADVIL;MOTRIN) 800 MG tablet Take 800 mg by mouth every 12 hours as needed for Pain       pantoprazole (PROTONIX) 40 MG tablet Take 40 mg by mouth daily      busPIRone (BUSPAR) 5 MG tablet Take 5 mg by mouth 2 times daily       Calcium Carb-Cholecalciferol (CALCIUM 600-D PO) Take 1 tablet by mouth 2 times daily       Multiple Vitamins-Minerals (THERAPEUTIC MULTIVITAMIN-MINERALS) tablet Take 1 tablet by mouth daily      Ascorbic Acid (VITAMIN C) 500 MG CAPS Take 500 mg by mouth daily 30 capsule 3    cloNIDine (CATAPRES) 0.1 MG tablet Take 0.1 mg by mouth 2 times daily       QUEtiapine (SEROQUEL) 200 MG tablet Take 150 mg by mouth nightly       [DISCONTINUED] promethazine (PHENERGAN) 25 MG tablet Take 0.5-1 tablets by mouth every 6 hours as needed for Nausea (Patient taking differently: Take 25 mg by mouth every 12 hours ) 30 tablet 0    [DISCONTINUED] topiramate (TOPAMAX) 100 MG tablet Take 100 mg by mouth 2 times daily      [DISCONTINUED] Buprenorphine HCl-Naloxone HCl (BUNAVAIL) 6.3-1 MG FILM Place 1 strip inside cheek daily      [DISCONTINUED] cyanocobalamin 1000 MCG/ML injection Inject 1 mL into the skin every 30 days 1 mL 11    [DISCONTINUED] lisinopril (PRINIVIL;ZESTRIL) 20 MG tablet Take 20 mg by mouth daily      [DISCONTINUED] FLUoxetine (PROZAC) 20 MG capsule Take 20 mg by mouth nightly      [DISCONTINUED] furosemide (LASIX) 20 MG tablet Take 20 mg by mouth daily      [DISCONTINUED] butalbital-acetaminophen-caffeine (FIORICET, ESGIC) -40 MG per tablet Take 2 tablets by mouth 2 times daily      [DISCONTINUED] magnesium oxide (MAG-OX) 400 (241.3 MG) MG TABS tablet Take 1 tablet by mouth daily 60 tablet 3    [DISCONTINUED] cyanocobalamin 1000 MCG/ML injection       [DISCONTINUED] SUMAtriptan (IMITREX) 25 MG tablet       [DISCONTINUED] ALPRAZolam (XANAX) 1 MG tablet Take 1 mg by mouth nightly as needed for Sleep      [DISCONTINUED] Cupric Chloride (COPPER) 0.4 MG/ML injection Infuse 5 mLs intravenously once a week for 5 doses 25 mL 0    [DISCONTINUED] Potassium (POTASSIMIN PO) Take by mouth      [DISCONTINUED] Cholecalciferol (VITAMIN D3) 80045 UNITS CAPS Take 1 capsule by mouth every 7 days. [DISCONTINUED] potassium chloride SA (K-DUR;KLOR-CON M) 10 MEQ tablet Take 10 mEq by mouth daily.       [DISCONTINUED] potassium chloride SA (K-DUR;KLOR-CON M) 10 MEQ tablet Take 1 tablet by mouth daily for 7 days. 7 tablet 0    [DISCONTINUED] folic acid (FOLVITE) 1 MG tablet Take 1 mg by mouth daily.        Jose Rafael Newberry, PharmD  PGY1 Pharmacy Resident  [id number]

## 2020-09-19 NOTE — CONSULTS
PULMONARY AND CRITICAL CARE MEDICINE CONSULT NOTE      Name: Kerri Leyva  Sex: female  : 1975  MRN: [id number]  Admission Date: 2020  Admission Diagnosis: Acute respiratory failure ([facility] [address] [J96.00]  Acute respiratory failure ([facility] [address] [J96.00]  Date of ICU admission: 20    Reason for ICU admission: Acute hypercapnic respiratory failure    HPI: Patient is a 50 y.o. female with past medical history significant for narcotic abuse, congestive heart failure, history of contractures of upper extremities, Parish-en Y gastric bypass, seizure disorder, hep C, Erler Danlos syndrome who is a nursing home resident at [place] who was brought in for unresponsiveness. Patient was given Narcan in route without much improvement in respiratory status and responsiveness. When she was brought to ER, she was unresponsive and was mechanically intubated for respiratory failure. ABG was suggestive of acute hypercapnic respiratory failure. Patient was recently placed on Suboxone on 2020. Urine tox screen was negative. Patient was noted to have CTA suggestive of multiple right lower lobe pulmonary artery embolism for which she was started on heparin drip. She was also a copy rule out. When patient arrived in ICU, she was alert and responsive. Her hemodynamics were stable. She was extubated after nurse discussed the case with me. Currently she is on nasal cannula and completely alert and oriented x3. She does have soft blood pressure in 90s and as per patient, she runs low blood pressure. Troponins were negative proBNP was only mildly elevated. Patient states that she does not move around in the nursing home. She generally uses a wheelchair but mostly is immobile. REVIEW OF SYSTEMS:   Constitutional symptoms: The patient denies fever, fatigue, night sweats, weight loss or weight gain. HEENT: No vision changes. No tinnitus, Denies sinus pain. No hoarseness, or dysphagia.    Neck: Patient denies current efforts to prevent transmission of COVID-19 and also the need to preserve PPE for other caregivers, a face-to-face encounter with the patient was not performed. That being said, all relevant records and diagnostic tests were reviewed, including laboratory results and imaging. Please reference any relevant documentation elsewhere. Care will be coordinated with the primary service. BP (!) 95/53   Pulse 66   Temp 97.7 °F (36.5 °C) (Temporal)   Resp 19   Ht 5' 7\" (1.702 m)   Wt 131 lb 2.8 oz (59.5 kg)   LMP 05/17/2014   SpO2 98%   BMI 20.54 kg/m²  I/O last 3 completed shifts:  In: -   Out: 400 [Urine:400] No intake/output data recorded. Intake/Output Summary (Last 24 hours) at 9/19/2020 1330  Last data filed at 9/19/2020 0600  Gross per 24 hour   Intake --   Output 400 ml   Net -400 ml         LABS:    Recent Labs     09/18/20  1200 09/18/20  1803 09/19/20  0415   WBC 7.8 7.4 5.6   RBC 3.46* 3.50* 3.00*   HGB 11.2* 11.3* 9.8*   HCT 34.5* 34.9* 30.0*   MCH 32.3 32.2 32.5   MCHC 32.5 32.2 32.6   RDW 13.4 13.3 13.3    160 127*   MPV 9.0 9.7 9.6   NEUTOPHILPCT 90.7  --   --    MONOPCT 4.2  --   --    BASOPCT 0.2  --   --      Recent Labs     09/18/20  1200 09/19/20  0415   * 141   K 4.3 4.6   CL 96* 102   ANIONGAP 7 5   CO2 32 34*   BUN 21* 15   CREATININE <0.5* <0.5*   CALCIUM 9.1 9.0   PROT 6.4  --    BILITOT 1.2*  --    ALKPHOS 117  --    ALT 28  --    AST 20  --    GLUCOSE 114* 115*     Recent Labs     09/18/20  1242   PHART 7.191*   DOO5PZX 97.7*   PO2ART 131.0*   JBL0MMR 37.4*   R4NRWDQT 98.6   BEART 6.4*     Recent Labs     09/18/20  1200   INR 0.97       Recent Labs     09/18/20  1200   TROPONINI <0.01       IMAGING:  I personally reviewed the CT chest from 9/18/2020 and my interpretation is as follows. Right lower lobe pulmonary artery branches pulmonary embolism with bilateral lower lobe atelectasis.       IMPRESSION:  Acute hypercapnic respiratory failure  Right lower lobe addressed with the provider for clarification.

## 2020-09-19 NOTE — CONSULTS
NEUROLOGY CONSULTATION     Patient's Name :   Kristen Hurley        YOB: 1975                    Date of Consultation : 9/19/2020 11:55 AM    Referring Physician :  Khadijah Swartz MD    Reason for Consultation :  Rule out stroke    HISTORY OF PRESENT ILLNESS      Kristen Hurley is a 50 y.o. female   History was obtained from patient and from dictations in the chart. Additional history was obtained from the patient's nurse. Patient had gastric bypass surgery several years ago. She has history of chronic opiate use seizures hepatitis C oral Danlos syndrome who was brought to the hospital for unresponsiveness. She was noted to have elevated CO2 level. Patient was intubated emergently initially. Now she is extubated. Patient had a CTA chest showing multiple right-sided pulmonary embolism. She is on a heparin drip. Patient has had significant nutritional deficiencies and progressive weakness as well as neuropathy. I was asked to see the patient to rule out a possible stroke. REVIEW OF SYSTEMS     Patient complains of significant generalized pain. She also complains of being weak all over. Denies any chest pain palpitation breathlessness or abdominal pain.   Rest of the 14 system review was reviewed and was negative except for the symptoms noted above    Past Medical History:   Diagnosis Date    Adult failure to thrive     Allergic     Anemia     related to gastric bypass, followed by Dr. Astrid Linares CHF (congestive heart failure) ([address]     Chronic neck pain     Copper deficiency     Depression     Hypertension     Sciatica     Seizures ([address] 3/2014    hypoglycemia and/or benzo withdrawal    Vitamin B12 deficiency neuropathy ([facility] [address]      Family History   Problem Relation Age of Onset    Dementia Mother     High Blood Pressure Mother     Thyroid Cancer Mother     Heart Disease Father     Stroke Father  Heart Attack Father     Thyroid Cancer Maternal Grandmother     High Blood Pressure Maternal Grandmother     Heart Disease Maternal Grandmother     Lung Cancer Paternal Grandfather      Social History     Socioeconomic History    Marital status:      Spouse name: None    Number of children: None    Years of education: None    Highest education level: None   Occupational History    None   Social Needs    Financial resource strain: None    Food insecurity     Worry: None     Inability: None    Transportation needs     Medical: None     Non-medical: None   Tobacco Use    Smoking status: Former Smoker     Packs/day: 1.00     Types: Cigarettes    Smokeless tobacco: Never Used   Substance and Sexual Activity    Alcohol use: No    Drug use: No    Sexual activity: None   Lifestyle    Physical activity     Days per week: None     Minutes per session: None    Stress: None   Relationships    Social connections     Talks on phone: None     Gets together: None     Attends Hinduism service: None     Active member of club or organization: None     Attends meetings of clubs or organizations: None     Relationship status: None    Intimate partner violence     Fear of current or ex partner: None     Emotionally abused: None     Physically abused: None     Forced sexual activity: None   Other Topics Concern    None   Social History Narrative    None     Current Facility-Administered Medications   Medication Dose Route Frequency Provider Last Rate Last Dose    enoxaparin (LOVENOX) injection 60 mg  1 mg/kg Subcutaneous BID Milton Umaña MD        iopamidol (ISOVUE-370) 76 % injection 75 mL  75 mL Intravenous ONCE PRN Leila Porter APRN - CNP        vitamin C (ASCORBIC ACID) tablet 500 mg  500 mg Oral Daily Katty Aburto MD   500 mg at 09/19/20 0823    pantoprazole (PROTONIX) tablet 40 mg  40 mg Oral QAM AC Yina Collazo MD   40 mg at 09/19/20 0607    sodium chloride flush 0.9 % injection 10 mL  10 mL Intravenous 2 times per day Shira Lovelace MD   10 mL at 09/19/20 0826    sodium chloride flush 0.9 % injection 10 mL  10 mL Intravenous PRN Shira Lovelace MD        polyethylene glycol (GLYCOLAX) packet 17 g  17 g Oral Daily PRN Shira Lovelace MD        perflutren lipid microspheres (DEFINITY) injection 1.65 mg  1.5 mL Intravenous ONCE PRN Shira Lovelace MD        potassium chloride 10 mEq/100 mL IVPB (Peripheral Line)  10 mEq Intravenous PRN Shira Lovelace MD        magnesium sulfate 2 g in 50 mL IVPB premix  2 g Intravenous PRN Shira Lovelace MD        folic acid (FOLVITE) tablet 1 mg  1 mg Oral Daily Shira Lovelace MD   1 mg at 09/19/20 0387    therapeutic multivitamin-minerals 1 tablet  1 tablet Oral Daily Shira Lovelace MD   1 tablet at 09/19/20 0823    albuterol sulfate  (90 Base) MCG/ACT inhaler 2 puff  2 puff Inhalation Q4H Shira Lovelace MD   2 puff at 09/19/20 1154     Allergies   Allergen Reactions    Acetaminophen Other (See Comments)     Pt states \"I am not supposed to take it because of my liver syndrome\"    Codeine Hives and Itching    Morphine Itching    Penicillins Hives and Swelling     Facial swelling    Morphine And Related Nausea And Vomiting    Ondansetron Hcl Itching and Rash       PHYSICAL EXAMINATION:  BP (!) 90/54   Pulse 61   Temp 97.7 °F (36.5 °C) (Temporal)   Resp 14   Ht 5' 7\" (1.702 m)   Wt 131 lb 2.8 oz (59.5 kg)   LMP 05/17/2014   SpO2 98%   BMI 20.54 kg/m²   Appearance: Well appearing, thinly built and in no distress  Mental Status Exam: Patient is alert, oriented to person, place and time.    Recent and remote memory is normal  Fund of Knowledge is normal  Attention/concentration is normal.   Speech : No dysarthria  Language : No aphasia  Funduscopic Exam: sharp disc margins  Cranial Nerves:   II: Visual fields:  Full to confrontation  III: Pupils:  equal, round, reactive to light  III,IV,VI: Extra Ocular Movements are intact. No nystagmus  V: Facial sensation is intact to pin prick and light touch  VII: Facial strength and movements: intact and symmetric smile,cheek puffing and eyebrow elevation  VIII: Hearing:  Intact to finger rub bilaterally  IX: Palate  elevation is symmetric  XI: Shoulder shrug is intact  XII: Tongue movements are normal  Motor:  Muscle tone is decreased. .   Strength is 3+ to 4/5 all over. Patient has bilateral wrist drop. .  Sensory: Decreased to light touch in the distal upper and lower extremities  Coordination: Impaired finger to Nose and Heel to Shin bilaterally    . Reflexes:  DTR 1 and symmetric bilaterally in the upper extremities and diminished in the lower extremities  Plantar response: Flexor bilaterally  Gait: Unable to ambulate at this time  Romberg: Could not be tested  Vascular: No carotid bruit bilaterally        DATA:  LABS:  General Labs:    CBC:   Lab Results   Component Value Date    WBC 5.6 09/19/2020    RBC 3.00 09/19/2020    RBC 3.58 02/02/2017    HGB 9.8 09/19/2020    HCT 30.0 09/19/2020    MCV 99.9 09/19/2020    MCH 32.5 09/19/2020    MCHC 32.6 09/19/2020    RDW 13.3 09/19/2020     09/19/2020    MPV 9.6 09/19/2020     BMP:    Lab Results   Component Value Date     09/19/2020    K 4.6 09/19/2020     09/19/2020    CO2 34 09/19/2020    BUN 15 09/19/2020    LABALBU 4.0 09/18/2020    CREATININE <0.5 09/19/2020    CALCIUM 9.0 09/19/2020    GFRAA >60 09/19/2020    LABGLOM >60 09/19/2020    GLUCOSE 115 09/19/2020   CT head was normal    IMPRESSION :  Acute encephalopathy probably due to hypercapnia  Multiple nutritional deficiencies with the neuropathy and neurological deficits  Patient has generalized weakness and bilateral wrist drop  Patient has had known copper deficiency and has had a myeloneuropathy  Intestinal malabsorption following gastrectomy  Patient Active Problem List   Diagnosis    Vitamin B12 deficiency    Intestinal malabsorption following gastrectomy    KETAN (iron deficiency anemia)    Copper deficiency myeloneuropathy (HCC)    Acute respiratory failure ([facility] [address]     RECOMMENDATIONS ;  Discussed with patient  Discussed with patient's nurse  I will check vitamin B1, vitamin B6, vitamin B12 and TSH levels  I will also check a serum copper level  Await MRI brain  Thank you for this consultation. Please note a portion of this chart was generated using dragon dictation software. Although every effort was made to ensure the accuracy of this automated transcription, some errors in transcription may have occurred.

## 2020-09-19 NOTE — PROGRESS NOTES
Shift assessment complete, see flow sheets. VSS, pt a/ox4, on 1L of O2, tolerating well. Pt passed 3oz swallow eval, see flow sheets. Meds given, see MAR. Pt denies chest pain or pain with inspiration, complains of chronic neck/back pain. Purewick in place. Pt demonstrates ability to reposition self adequately. Discussed PoC, questions answered. Bed alarm on, call light in reach, denies further needs at this time, will continue to monitor.

## 2020-09-19 NOTE — PROGRESS NOTES
[facility] PROGRESS NOTE    9/19/2020 2:59 PM        Name: Cintia Schaefer . Admitted: 9/18/2020  Primary Care Provider: Referring Not In System (Inactive) (Tel: None)    Brief Course:    Patient is 50years old and was admitted from nursing home when she was found unresponsive, her past medical history significant for failure to thrive, chronic opiate use, history of Parish-en-Y gastric bypass, seizure, hepatitis C, Lilia-Danlos syndrome. She was given Suboxone recently on September 16 at nursing home, she was given Narcan, EMS was called and patient transferred to ER and admitted to ICU for further evaluation. She was initially intubated and extubated to the same day in the hospital.  CTA chest shows multiple right-sided pulmonary embolism. She was started on heparin GTT and she was switched to Lovenox twice daily. CC:  Unresponsive from nursing home    Subjective:  .   Alert oriented  No acute event overnight  Now extubated  Heparin GTT stopped  Neurology on board to see  Pending MRI brain  Critical care on board    Reviewed interval ancillary notes    Current Medications  enoxaparin (LOVENOX) injection 60 mg, BID  iopamidol (ISOVUE-370) 76 % injection 75 mL, ONCE PRN  vitamin C (ASCORBIC ACID) tablet 500 mg, Daily  pantoprazole (PROTONIX) tablet 40 mg, QAM AC  sodium chloride flush 0.9 % injection 10 mL, 2 times per day  sodium chloride flush 0.9 % injection 10 mL, PRN  polyethylene glycol (GLYCOLAX) packet 17 g, Daily PRN  perflutren lipid microspheres (DEFINITY) injection 1.65 mg, ONCE PRN  potassium chloride 10 mEq/100 mL IVPB (Peripheral Line), PRN  magnesium sulfate 2 g in 50 mL IVPB premix, PRN  folic acid (FOLVITE) tablet 1 mg, Daily  therapeutic multivitamin-minerals 1 tablet, Daily  albuterol sulfate  (90 Base) MCG/ACT inhaler 2 puff, Q4H        Objective:  BP (!) 95/53   Pulse 66   Temp 97.7 °F (36.5 °C) (Temporal)   Resp 19   Ht 5' 7\" (1.702 m)   Wt 131 lb 2.8 oz (59.5 kg)   LMP 05/17/2014   SpO2 98%   BMI 20.54 kg/m²     Intake/Output Summary (Last 24 hours) at 9/19/2020 1459  Last data filed at 9/19/2020 0600  Gross per 24 hour   Intake --   Output 400 ml   Net -400 ml      Wt Readings from Last 3 Encounters:   09/19/20 131 lb 2.8 oz (59.5 kg)   02/09/17 153 lb (69.4 kg)   02/02/17 149 lb 12.8 oz (67.9 kg)       General appearance:  Appears comfortable  Eyes: Sclera clear. Pupils equal.  ENT: Moist oral mucosa. Trachea midline, no adenopathy. Cardiovascular: Regular rhythm, normal S1, S2. No murmur. No edema in lower extremities  Respiratory: Not using accessory muscles. Good inspiratory effort. Clear to auscultation bilaterally, no wheeze or crackles. GI: Abdomen soft, no tenderness, not distended, normal bowel sounds  Musculoskeletal: No cyanosis in digits, neck supple  Neurology: CN 2-12 grossly intact. No speech or motor deficits  Psych: Normal affect. Alert and oriented in time, place and person  Skin: Warm, dry, normal turgor  Extremity exam shows brisk capillary refill. Peripheral pulses are palpable in lower extremities     Labs and Tests:  CBC:   Recent Labs     09/18/20  1200 09/18/20  1803 09/19/20  0415   WBC 7.8 7.4 5.6   HGB 11.2* 11.3* 9.8*    160 127*     BMP:    Recent Labs     09/18/20  1200 09/19/20  0415   * 141   K 4.3 4.6   CL 96* 102   CO2 32 34*   BUN 21* 15   CREATININE <0.5* <0.5*   GLUCOSE 114* 115*     Hepatic:   Recent Labs     09/18/20  1200   AST 20   ALT 28   BILITOT 1.2*   ALKPHOS 117     XR CHEST 1 VIEW   Final Result   1. Endotracheal tube tip approximately 12 mm above the trixie   2. Persistent left basilar opacity could represent atelectasis         CT CHEST PULMONARY EMBOLISM W CONTRAST   Final Result   Multiple right-sided pulmonary emboli. Bibasilar segmental atelectasis versus pneumonia.       Findings were discussed with Emiliana Escobar VICTORINO at 1:33 pm on 9/18/2020. CT Head WO Contrast   Final Result   1. No acute intracranial abnormality. XR CHEST PORTABLE   Final Result   New left basilar opacity could represent atelectasis or left lower lobe   pneumonia         MRI BRAIN WO CONTRAST    (Results Pending)       Discussed with patient and ICU nurse    Problem List  Active Problems:    Acute respiratory failure ([address]  Resolved Problems:    * No resolved hospital problems. *       Assessment & Plan:     Acute hypercapnic respiratory failure  Right-sided pulmonary embolism on CAT scan  Chronic pancytopenia  Vitamin D deficiency  History of gastric bypass  History of copper deficiency  Opiate dependence  History of left malleolus fracture  History of Ehler's Danlos syndrome    Continue ICU care  Critical care on board  Switched from heparin to Lovenox twice daily  Neurology on board  Vitamin B1, B6, B12, TSH level, serum copper pending. Pending MRI  Pending echo  She was extubated yesterday. COVID testing is negative    IV Access: Peripheral  Graf: Yes  Diet: DIET GENERAL;  Code:Full Code  DVT PPX Lovenox twice daily  Disposition : Patient can be transfer out of ICU with remote telemetry.     Marianne Waller MD   9/19/2020 2:59 PM

## 2020-09-19 NOTE — FLOWSHEET NOTE
Dr Ciara Antonio notified of bp 90's/50's and no urine output with rasheed in place. Pt has been NPO as well with no IVF. New orders given for 1L LR bolus which is now complete. Pts bed checked and she is incontinent of urine. Rasheed changed but no urine noted in tube. Will monitor and remove tube and place purewick if she continues to leak.

## 2020-09-20 NOTE — PROGRESS NOTES
[facility] Orthopedic Surgery  Consult Note          Orthopedic Consult, full note to follow tomorrow. Juliocesar Veras 50 y.o. admitted for unresponsiveness,  left ankle pain. Xray reviewed, and showed left distal tibia and fibula fracture with callus indicated subacute fracture. Plan:  - We will discuss with the patient the different treatment options including both surgical and non surgical.    Thank you very much for the kind consultation and allowing me to participate in this patient's care. I will continue to keep you apprised of her progress.          Nikko Paz MD 9/20/2020 7:08 PM

## 2020-09-20 NOTE — PROGRESS NOTES
Clinical Pharmacy Note: Pharmacy to Dose Vancomycin    Jay Wade is a 50 y.o. female started on Vancomycin for Enterococcus UTI; consult received from Dr. Misael Wilkerson to manage therapy. Also receiving the following antibiotics: none. Goal Trough Level: 10-15 mcg/mL    Assessment/Plan:  Initiate vancomycin 750 mg IV every 12 hours. A vancomycin trough has been ordered for 9/22 @ 1130. Changes in regimen will be determined based on culture results, renal function, and clinical response. Pharmacy will continue to monitor and adjust regimen as necessary. Allergies:  Acetaminophen; Codeine; Morphine; Penicillins; Morphine and related; and Ondansetron hcl         Recent Labs     09/19/20  0415 09/20/20  0412   CREATININE <0.5* <0.5*       Recent Labs     09/19/20  0415 09/20/20  0412   WBC 5.6 5.0       Ht Readings from Last 1 Encounters:   09/18/20 5' 7\" (1.702 m)        Wt Readings from Last 1 Encounters:   09/20/20 130 lb (59 kg)         CrCl cannot be calculated (This lab value cannot be used to calculate CrCl because it is not a number: <0.5).       Thank you for the consult,    Gabriela Wooten, PharmD  PGY1 Pharmacy Resident  [id number]

## 2020-09-20 NOTE — PROGRESS NOTES
4 Eyes Skin Assessment     The patient is being assess for  Transfer to New Unit    I agree that 2 RN's have performed a thorough Head to Toe Skin Assessment on the patient. ALL assessment sites listed below have been assessed. Areas assessed by both nurses: Black Faster  [x]   Head, Face, and Ears   [x]   Shoulders, Back, and Chest  [x]   Arms, Elbows, and Hands   [x]   Coccyx, Sacrum, and IschIum   [x]   Legs, Feet, and Heels        Does the Patient have Skin Breakdown?   Yes LDA WOUND CARE was Initiated documentation include the Stephanie-wound, Wound Assessment, Measurements, Dressing Treatment, Drainage, and Color\",         Lv Prevention initiated:  Yes   Wound Care Orders initiated:  No      WOC nurse consulted for Pressure Injury (Stage 3,4, Unstageable, DTI, NWPT, and Complex wounds), New and Established Ostomies:  No      Nurse 1 eSignature: Electronically signed by Kaia Bach RN on 9/20/20 at 12:12 PM EDT    **SHARE this note so that the co-signing nurse is able to place an eSignature**    Nurse 2 eSignature: Electronically signed by Rosalind Jaime RN on 9/20/20 at 12:48 PM EDT

## 2020-09-20 NOTE — PROGRESS NOTES
[facility] PROGRESS NOTE    9/20/2020 1:41 PM        Name: Valery Shah . Admitted: 9/18/2020  Primary Care Provider: Referring Not In System (Inactive) (Tel: None)    Brief Course:    Patient is 50years old and was admitted from nursing home when she was found unresponsive, her past medical history significant for failure to thrive, chronic opiate use, history of Parish-en-Y gastric bypass, seizure, hepatitis C, Lilia-Danlos syndrome. She was given Suboxone recently on September 16 at nursing home, she was given Narcan, EMS was called and patient transferred to ER and admitted to ICU for further evaluation. She was initially intubated and extubated to the same day in the hospital.  CTA chest shows multiple right-sided pulmonary embolism. She was started on heparin GTT and she was switched to Lovenox twice daily. CC:  Unresponsive from nursing home    Subjective:  .   Alert oriented  No acute event overnight  Now extubated  Heparin GTT stopped  Neurology on board to see  Pending MRI brain  Urine growing enterococcus    Reviewed interval ancillary notes    Current Medications  buprenorphine-naloxone (SUBOXONE) 2-0.5 MG SL film 2 Film, Daily  vancomycin (VANCOCIN) 750 mg in dextrose 5 % 250 mL IVPB, Q12H  enoxaparin (LOVENOX) injection 60 mg, BID  iopamidol (ISOVUE-370) 76 % injection 75 mL, ONCE PRN  vitamin C (ASCORBIC ACID) tablet 500 mg, Daily  pantoprazole (PROTONIX) tablet 40 mg, QAM AC  sodium chloride flush 0.9 % injection 10 mL, 2 times per day  sodium chloride flush 0.9 % injection 10 mL, PRN  polyethylene glycol (GLYCOLAX) packet 17 g, Daily PRN  perflutren lipid microspheres (DEFINITY) injection 1.65 mg, ONCE PRN  potassium chloride 10 mEq/100 mL IVPB (Peripheral Line), PRN  magnesium sulfate 2 g in 50 mL IVPB premix, PRN  folic acid (FOLVITE) tablet 1 mg, Daily  therapeutic multivitamin-minerals 1 Final Result   Multiple right-sided pulmonary emboli. Bibasilar segmental atelectasis versus pneumonia. Findings were discussed with Melia Du at 1:33 pm on 9/18/2020. CT Head WO Contrast   Final Result   1. No acute intracranial abnormality. XR CHEST PORTABLE   Final Result   New left basilar opacity could represent atelectasis or left lower lobe   pneumonia         MRI BRAIN WO CONTRAST    (Results Pending)   XR ANKLE LEFT (MIN 3 VIEWS)    (Results Pending)       Discussed with patient and ICU nurse    Problem List  Active Problems:    Acute respiratory failure ([address]  Resolved Problems:    * No resolved hospital problems. *       Assessment & Plan:     Acute hypercapnic respiratory failure  Right-sided pulmonary embolism on CAT scan  Urinary tract infection, enterococcus  Chronic pancytopenia  Vitamin D deficiency  History of gastric bypass  History of copper deficiency  Opiate dependence  History of left malleolus fracture  History of Ehler's Danlos syndrome    Patient transferred to [address] [place] [address]  Switched from heparin to Lovenox twice daily  Neurology on board  Vitamin B1, B6, B12, TSH level, serum copper pending.   Pending MRI  Pending echo  Started vancomycin for urinary tract infection, enterococcus  Left ankle pain possible fracture will obtain x-ray pending  COVID testing is negative    IV Access: Peripheral  Graf: Yes  Diet: DIET GENERAL;  Code:Full Code  DVT PPX Lovenox twice daily  Disposition : Inpatient    Devante Bernal MD   9/20/2020 1:41 PM

## 2020-09-20 NOTE — PROGRESS NOTES
Message sent to on-call Dr. Elza Lawton regarding xray results. Per Dr. Izzy Foster, wanted this RN to message when resulted. Waiting for response.

## 2020-09-20 NOTE — PROGRESS NOTES
Pt up to unit. Shift and skin assessment complete, see flowsheet. VSS. Pt placed on bedpan but could not go, purewick changed and pt repositioned. Boots placed on pt. MD at bedside. Atb to start. Suboxone to be restarted. Xray to be done on L ankle, pt stated she broke it back in August. Pt gives delayed responses, isn't sure why she is here. Pt expressed no further needs. Posey and bed alarm engaged. Call light in reach.

## 2020-09-20 NOTE — FLOWSHEET NOTE
09/20/20 0932   Provider Notification   Reason for Communication New orders   Provider Name [facility]   Provider Notification Physician   Method of Communication Secure Message   Response Waiting for response   Notification Time 0932     \"Patient is requesting something for pain. C/o back and neck pain 8/10.  Thanks\"

## 2020-09-20 NOTE — PROGRESS NOTES
Pt shift assessment completed. Pt is alert and orient * 4. Doesn't appear to be in pain. Pt respiration are regular and unlabored and on room air. Breath sounds clear. PIV flushed and patent. Scheduled medications given as ordered. Patient encouraged to use call light with any needs. Patient states understanding, call light in reach, bed alarm on. All safety checks in place and will continue to monitor pt.

## 2020-09-21 NOTE — PROGRESS NOTES
Occupational Therapy/Physical Therapy  Josse Primes    Patient LAKEISHA to ECHO at time of OT/PT evaluation attempt. Additionally, patient with pending plans from ortho for treatment of (L) distal tibia and fibula fracture with callus indicated subacute fracture. Will follow up for OT/PT assessment as medically appropriate to participate.     Thank you,    Vidya Santiago, MOT OTR/L [id number]  [address], [facility]

## 2020-09-21 NOTE — PLAN OF CARE
Problem: Falls - Risk of:  Goal: Will remain free from falls  Description: Will remain free from falls  Outcome: Met This Shift  Goal: Absence of physical injury  Description: Absence of physical injury  Outcome: Met This Shift     Problem: Skin Integrity:  Goal: Will show no infection signs and symptoms  Description: Will show no infection signs and symptoms  Outcome: Met This Shift  Goal: Absence of new skin breakdown  Description: Absence of new skin breakdown  Outcome: Met This Shift     Problem: Pain:  Goal: Pain level will decrease  Description: Pain level will decrease  Outcome: Met This Shift  Goal: Control of acute pain  Description: Control of acute pain  Outcome: Met This Shift

## 2020-09-21 NOTE — PLAN OF CARE
Problem: Falls - Risk of:  Goal: Will remain free from falls  Description: Will remain free from falls  Outcome: Ongoing  Note: Fall precautions in place: bed locked and in lowest position, call light and over head table within reach, bed alarm on. Patient knows when to appropriately call out for help.    Goal: Absence of physical injury  Description: Absence of physical injury  Outcome: Ongoing     Problem: Skin Integrity:  Goal: Will show no infection signs and symptoms  Description: Will show no infection signs and symptoms  Outcome: Ongoing  Goal: Absence of new skin breakdown  Description: Absence of new skin breakdown  Outcome: Ongoing     Problem: Pain:  Goal: Pain level will decrease  Description: Pain level will decrease  Outcome: Ongoing  Goal: Control of acute pain  Description: Control of acute pain  Outcome: Ongoing  Goal: Control of chronic pain  Description: Control of chronic pain  Outcome: Ongoing

## 2020-09-21 NOTE — CONSULTS
[facility] Orthopedic Surgery  Consult Note        This patient is seen in consultation at the request of Dr Lezlie Holter, MD     Reason for Consult:   Left ankle pain / distal tibia shaft and lateral malleolus fracture. CHIEF COMPLAINT:   Left ankle pain / fall. History Obtained From:  patient, electronic medical record    HISTORY OF PRESENT ILLNESS:    Ms. Lencho Umanzor 50 y.o.  female seen today for consultation and evaluation of a left ankle injury which occurred when she fell few weeks ago. She was first seen and evaluated in  ED 9/18/2020, where she was x-rayed, with Orthopedic consultation. She is complaining of medial & lateral left ankle pain and swelling. This is better with elevation and worse with bearing any wt. The pain is sharp and not radiating. No numbness or tingling sensation. Alleviating factors: rest.  She was admitted for failure to thrive, and change of mental status, brought in to [facility] ED for unresponsiveness. She is residing at [facility] presently. They gave her narcan without much response, EMS was called. Patient remained somnolent in ER. Narcan was given again and was intubated emergently. She had CTA chest showing multiple right sided PE, and was started  on heparin. She has a h/o chronic opiate use, hx Parish-en Y gastric bypass, seizures, Hep C, Ehler-Danlos Syndrome. She was placed on suboxone recently on 9/16/20.        Past Medical History:        Diagnosis Date    Adult failure to thrive     Allergic     Anemia     related to gastric bypass, followed by Dr. Michelle Alcantara    Anxiety     [facility] ([facility]estive [facility] failure) [facility]     Chronic neck pain     Copper deficiency     Depression     Hypertension     Sciatica     Seizures ([facility] 3/2014    hypoglycemia and/or benzo withdrawal    Vitamin B12 deficiency neuropathy (HCC)        Past Surgical History:        Procedure Laterality Date    ABDOMINOPLASTY      CHOLECYSTECTOMY      GASTRIC BYPASS SURGERY      TUNNELED VENOUS PORT PLACEMENT Right 5/2/2014    Dr. Stern Other       Medications prior to admission:   Prior to Admission medications    Medication Sig Start Date End Date Taking? Authorizing Provider   buprenorphine-naloxone (SUBOXONE) 8-2 MG FILM SL film Place 1 Film under the tongue 2 times daily.    Yes Historical Provider, MD   vitamin B-12 (CYANOCOBALAMIN) 500 MCG tablet Take 500 mcg by mouth daily   Yes Historical Provider, MD   promethazine (PHENERGAN) 25 MG tablet Take 25 mg by mouth every 12 hours   Yes Historical Provider, MD   melatonin 3 MG TABS tablet Take 3-6 mg by mouth nightly as needed (Sleep)   Yes Historical Provider, MD   loperamide (IMODIUM) 2 MG capsule Take 2 mg by mouth every 12 hours   Yes Historical Provider, MD   ibuprofen (ADVIL;MOTRIN) 800 MG tablet Take 800 mg by mouth every 12 hours as needed for Pain    Yes Historical Provider, MD   pantoprazole (PROTONIX) 40 MG tablet Take 40 mg by mouth daily   Yes Historical Provider, MD   busPIRone (BUSPAR) 5 MG tablet Take 5 mg by mouth 2 times daily    Yes Historical Provider, MD   Calcium Carb-Cholecalciferol (CALCIUM 600-D PO) Take 1 tablet by mouth 2 times daily    Yes Historical Provider, MD   Multiple Vitamins-Minerals (THERAPEUTIC MULTIVITAMIN-MINERALS) tablet Take 1 tablet by mouth daily   Yes Historical Provider, MD   Ascorbic Acid (VITAMIN C) 500 MG CAPS Take 500 mg by mouth daily 2/14/17  Yes Huber Kam MD   cloNIDine (CATAPRES) 0.1 MG tablet Take 0.1 mg by mouth 2 times daily    Yes Historical Provider, MD   QUEtiapine (SEROQUEL) 50 MG tablet Take 150 mg by mouth nightly    Yes Historical Provider, MD       Current Medications:   Current Facility-Administered Medications: buprenorphine-naloxone (SUBOXONE) 2-0.5 MG SL film 2 Film, 2 Film, Sublingual, Daily  vancomycin (VANCOCIN) 750 mg in dextrose 5 % 250 mL IVPB, 750 mg, Intravenous, Q12H  enoxaparin (LOVENOX) injection 60 mg, 1 mg/kg, Subcutaneous, BID  iopamidol (ISOVUE-370) 76 % injection 75 mL, 75 mL, Intravenous, ONCE PRN  vitamin C (ASCORBIC ACID) tablet 500 mg, 500 mg, Oral, Daily  pantoprazole (PROTONIX) tablet 40 mg, 40 mg, Oral, QAM AC  sodium chloride flush 0.9 % injection 10 mL, 10 mL, Intravenous, 2 times per day  sodium chloride flush 0.9 % injection 10 mL, 10 mL, Intravenous, PRN  polyethylene glycol (GLYCOLAX) packet 17 g, 17 g, Oral, Daily PRN  perflutren lipid microspheres (DEFINITY) injection 1.65 mg, 1.5 mL, Intravenous, ONCE PRN  potassium chloride 10 mEq/100 mL IVPB (Peripheral Line), 10 mEq, Intravenous, PRN  magnesium sulfate 2 g in 50 mL IVPB premix, 2 g, Intravenous, PRN  folic acid (FOLVITE) tablet 1 mg, 1 mg, Oral, Daily  therapeutic multivitamin-minerals 1 tablet, 1 tablet, Oral, Daily  albuterol sulfate  (90 Base) MCG/ACT inhaler 2 puff, 2 puff, Inhalation, Q4H    Allergies:  Acetaminophen; Codeine; Morphine; Penicillins; Morphine and related; and Ondansetron hcl    Social History     Socioeconomic History    Marital status:      Spouse name: Not on file    Number of children: Not on file    Years of education: Not on file    Highest education level: Not on file   Occupational History    Not on file   Social Needs    Financial resource strain: Not on file    Food insecurity     Worry: Not on file     Inability: Not on file    Transportation needs     Medical: Not on file     Non-medical: Not on file   Tobacco Use    Smoking status: Former Smoker     Packs/day: 1.00     Types: Cigarettes    Smokeless tobacco: Never Used   Substance and Sexual Activity    Alcohol use: No    Drug use: No    Sexual activity: Not on file   Lifestyle    Physical activity     Days per week: Not on file     Minutes per session: Not on file    Stress: Not on file   Relationships    Social connections     Talks on phone: Not on file     Gets together: Not on file     Attends [employer] service: Not on file     Active member of club or organization: Not on file Attends meetings of clubs or organizations: Not on file     Relationship status: Not on file    Intimate partner violence     Fear of current or ex partner: Not on file     Emotionally abused: Not on file     Physically abused: Not on file     Forced sexual activity: Not on file   Other Topics Concern    Not on file   Social History Narrative    Not on file       Family History:  Family History   Problem Relation Age of Onset    Dementia Mother     High Blood Pressure Mother     Thyroid Cancer Mother     Heart Disease Father     Stroke Father     Heart Attack Father     Thyroid Cancer Maternal Grandmother     High Blood Pressure Maternal Grandmother     Heart Disease Maternal Grandmother     Lung Cancer Paternal Grandfather          REVIEW OF SYSTEMS:   CONSTITUTIONAL: unexplained weight loss, no fevers, chills or fatigue  NEUROLOGICAL:  unsteady gait and progressive weakness, not walking for several months. PSYCHOLOGICAL: Denies anxiety, depression   SKIN: Denies skin changes, delayed healing, rash, itching   HEMATOLOGIC: Denies easy bleeding or bruising  ENDOCRINE: Denies excessive thirst, urination, heat/cold  RESPIRATORY: Denies current dyspnea, cough  CARDIOVASCULAR: Negative for chest pain at this time. EYES: Negative for photophobia and visual disturbance. ENT:  Negative for rhinorrhea, epistaxis, sore throat, or hearing loss. GI: Denies nausea, vomiting, diarrhea   : Denies bowel or bladder issues   MUSCULOSKELETAL: Left ankle pain, weakness all extremities. All other ROS reviewed in chart or with patient or family and are grossly negative. PHYSICAL EXAMINATION:  Ms. Micheline Castanon is a very pleasant 50 y.o. female who seen today in no acute distress, awake, alert, and oriented. She is well nourished and groomed. Patient with normal affect. Body mass index is 20.72 kg/m². . Skin warm and dry. Resting respiratory rate is 16. Resp deep and easy.  Pulse is with regular rate and rhythm    /79   Pulse 74   Temp 97.9 °F (36.6 °C) (Oral)   Resp 14   Ht 5' 7\" (1.702 m)   Wt 132 lb 4.4 oz (60 kg)   LMP 05/17/2014   SpO2 94%   BMI 20.72 kg/m²        Airway is intact  Chest: chest clear, no wheezing, rales, normal symmetric air entry, no tachypnea, retractions or cyanosis  Heart: regular rate and rhythm ; heart sounds normal   Hearing intact, pupil equal and reactive bilateral  Lymphatics; No groin or axillary enlarged lymph nodes. Neck; No swelling  Abdomen; soft, non distended. MUSCULOSKELETAL:   Examination of both ankles showing a decreased range of motion of the left ankle compare to the other side. There is mild swelling that can be seen, no ecchymosis. She has intact sensation and good pedal pulses. She has tenderness on deep palpation over the lateral malleolus and distal tibia shaft of the left ankle. She has generalized weakness of bilateral UE and LE.    NEUROLOGIC:   Sensory:    Touch:                     Right Upper Extremity:  normal                   Left Upper Extremity:  normal                  Right Lower Extremity:  normal                  Left Lower Extremity:  normal        DATA:    CBC:   Lab Results   Component Value Date    WBC 5.5 09/21/2020    RBC 3.21 09/21/2020    RBC 3.58 02/02/2017    HGB 10.4 09/21/2020    HCT 32.0 09/21/2020    MCV 99.6 09/21/2020    MCH 32.3 09/21/2020    MCHC 32.5 09/21/2020    RDW 14.0 09/21/2020     09/21/2020    MPV 9.3 09/21/2020     WBC:    Lab Results   Component Value Date    WBC 5.5 09/21/2020     PT/INR:    Lab Results   Component Value Date    PROTIME 11.2 09/18/2020    INR 0.97 09/18/2020     PTT:    Lab Results   Component Value Date    APTT 32.2 09/19/2020   [APTT    IMAGING: Xrays, 3 views of the left ankle dated 9/20/2020 from [facility],  were reviewed, and showed a minimally displaced distal tibia shaft and lateral malleolus fracture with callus formation.        IMPRESSION: Left tibia and fibula fracture. PLAN:  I discussed with Rock Vaughn the treatment options and that the overall alignment of this fracture is good and we can try to treat this non-operatively with NWB left ankle for 6 weeks. We discussed the risk of nonunion and or malunion. We will see her  back in 6 weeks at which time we will get a new xray of the left ankle. Thank you very much for the kind consultation and allowing me to participate in this patient's care. I will continue to keep you apprised of her progress.         Rupinder Malik MD   9/21/2020  3:00 PM

## 2020-09-21 NOTE — PROGRESS NOTES
Pt has her lip pierced and her tongue. This RN made pt aware that they would need to be removed p/t MRI but pt states they are plastic. This was relayed to MRI who said that would be fine if they were left in.

## 2020-09-21 NOTE — PROGRESS NOTES
Pharmacy to check patient copays for Eliquis/Xarelto:    Copay for patient will be: $0/month for either Eliquis or Xarelto. Pharmacy will continue to follow the decision for anticoagulation and  the patient if appropriate.      Jayy Cardoso, PharmD  Clinical Pharmacist [id number]  9/21/2020

## 2020-09-21 NOTE — PROGRESS NOTES
Initial assessment completed, VSS, afebrile, pt currently reports 9/10 pain to lower back and LLE, message sent to pharmacy requesting pt's scheduled Suboxone. Pt a/o x4, peripheral vascular assessment WDL to LLE, pt reports pain with palpation and movement to LLE. POC discussed with pt, questions/concerns addressed at this time, fall px in place, bed alarm engaged, call light within reach, will continue to monitor.

## 2020-09-22 PROBLEM — N39.0 ENTEROCOCCUS UTI: Status: ACTIVE | Noted: 2020-01-01

## 2020-09-22 PROBLEM — B95.2 ENTEROCOCCUS UTI: Status: ACTIVE | Noted: 2020-01-01

## 2020-09-22 PROBLEM — I26.99 PULMONARY EMBOLUS (HCC): Status: ACTIVE | Noted: 2020-01-01

## 2020-09-22 NOTE — PROGRESS NOTES
Occupational Therapy   Occupational Therapy Initial Assessment  Date: 2020   Patient Name: Kerri Leyva  MRN: [id number]     : 1975    Date of Service: 2020    Discharge Recommendations: Kerri Leyva scored a 10/24 on the AM-PAC ADL Inpatient form. Current research shows that an AM-PAC score of 17 or less is typically not associated with a discharge to the patient's home setting. Based on the patient's AM-PAC score and their current ADL deficits, it is recommended that the patient have 3-5 sessions per week of Occupational Therapy at d/c to increase the patient's independence. Please see assessment section for further patient specific details. If patient discharges prior to next session this note will serve as a discharge summary. Please see below for the latest assessment towards goals. OT Equipment Recommendations  Other: defer to next level of care    Assessment   Performance deficits / Impairments: Decreased functional mobility ; Decreased endurance;Decreased coordination;Decreased ADL status; Decreased sensation;Decreased posture;Decreased ROM; Decreased balance;Decreased strength;Decreased high-level IADLs;Decreased fine motor control;Decreased cognition  Assessment: Pt is limited in the above areas impacting participation in ADLs and functional transfers. Pt would benefit from skilled inpatient OT to address these deficits and increase independence.   Treatment Diagnosis: Decreased functional status secondary to acute respiratory failure and L distal tib/fib fx  Prognosis: Fair  Decision Making: Medium Complexity  OT Education: OT Role;Plan of Care;ADL Adaptive Strategies  Patient Education: d/c planning, benefits of activity- pt verbalized understanding and would benefit from reinforcement  Barriers to Learning: level of arousal  REQUIRES OT FOLLOW UP: Yes  Activity Tolerance  Activity Tolerance: Patient limited by fatigue  Safety Devices  Safety Devices in place: Yes  Type of devices: All fall risk precautions in place; Left in bed;Bed alarm in place;Call light within reach;Nurse notified; Patient at risk for falls  Restraints  Initially in place: No           Patient Diagnosis(es): The primary encounter diagnosis was Multiple subsegmental pulmonary emboli without acute cor pulmonale. Diagnoses of Altered mental status, unspecified altered mental status type, Respiratory acidosis, Pneumonia due to organism, and Hypercarbia were also pertinent to this visit. has a past medical history of Adult failure to thrive, Allergic, Anemia, Anxiety, CHF (congestive heart failure) ([address] Chronic neck pain, Copper deficiency, Depression, Hypertension, Sciatica, Seizures ([address] and Vitamin B12 deficiency neuropathy ([address] has a past surgical history that includes Cholecystectomy; Gastric bypass surgery; Abdominoplasty; and Tunneled venous port placement (Right, 5/2/2014). Treatment Diagnosis: Decreased functional status secondary to acute respiratory failure and L distal tib/fib fx      Restrictions  Restrictions/Precautions  Restrictions/Precautions: Weight Bearing, Fall Risk  Required Braces or Orthoses?: Yes(boot)  Lower Extremity Weight Bearing Restrictions  Left Lower Extremity Weight Bearing: Non Weight Bearing  Position Activity Restriction  Other position/activity restrictions: Les Matta is a 50 y.o. 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 Patient was sent from [facility] as she was more drowsy and lethargic than normal.  Said patient is usually more awake and alert and conversive. She did have a recent leg fracture and therefore has a boot in place and ambulates in a wheelchair. Found to have R pulmonary emboli.   ABG was concerning for severe hypercarbia, respiratory acidosis. Possibly due to pulmonary clot burden vs seizure. Intubated and extubated 9/18. Subjective   General  Chart Reviewed: Yes  Patient assessed for rehabilitation services?: Yes  Additional Pertinent Hx: recent L distal tib/fib fx, hx of FTT and opioid abuse  Family / Caregiver Present: No  Diagnosis: acute respiratory failure  Subjective  Subjective: Pt supine in bed upon arrival; agreeable to eval with encouragement. Patient Currently in Pain: Yes  Pain Assessment  Pain Assessment: FLACC  Pain Level: 3  Pain Type: Chronic pain  Pain Location: Leg  Pain Orientation: Left  Pre Treatment Pain Screening  Intervention List: Patient able to continue with treatment  Vital Signs  Temp: 98 °F (36.7 °C)  Temp Source: Axillary  Pulse: 78  Heart Rate Source: Monitor  Resp: 16  BP: 128/75  BP Location: Left upper arm  BP Upper/Lower: Upper  MAP (mmHg): 92  Patient Position: Semi fowlers  Level of Consciousness: Alert  MEWS Score: 1  Patient Currently in Pain: Yes  Oxygen Therapy  SpO2: 93 %  O2 Device: None (Room air)  FiO2 : 93 %  Social/Functional History  Social/Functional History  Type of Home: Facility(rehab at [facility] for the last 2 months)  Home Equipment: Wheelchair-manual  ADL Assistance: Needs assistance(pt uses adapted utensils for eating, pt toilets in a depends and gets assistance for showering/bathing)  Homemaking Responsibilities: No  Ambulation Assistance: Needs assistance(non ambulatory, needs assistance to propel w/c)  Transfer Assistance: Needs assistance(maximal assist of 1 person for a squat pivot transfer)  Active : No  Leisure & Hobbies: \"none\"  Additional Comments: The pt has not ambulated in 9 months. She was admitted to [facility] for rehab 2 months ago after a fall that caused a tib/fib fracture. The pt fell at home and stated it was her 's fault. The pt plans to return to [facility] and ultimately find a place to live alone. Pt has had no other recent falls. Pt stated her extreme weakness is due to malnutrition. Objective   Vision: Within Functional Limits  Hearing: Within functional limits    Orientation  Overall Orientation Status: Within Functional Limits  Observation/Palpation  Posture: Fair  Observation: rounded back and shoulders, flat affect  Balance  Sitting Balance: Stand by assistance(sat EOB ~10 minutes with encouragement; pt expressed fatigue after 4 minutes, but was willing to finish ADLs)  Standing Balance: Unable to assess(comment)  Standing Balance  Comment: Did not assess on this date due to L NWB status and pt reporting she has not stood in [place] long time\"  Functional Mobility  Functional Mobility Comments: Did not assess on this date due to L NWB status and pt reporting she has not ambulated in 9 months  ADL  Feeding: Bringing food to mouth assist;Maximum assistance; Increased time to complete;Scoop assist(Pt typically self-feeds with set up and adapted utensils)  Grooming: Maximum assistance;Setup; Increased time to complete(washing face with wipes seated EOB)  UE Bathing: Maximum assistance;Setup; Increased time to complete(washing underarms with wipes seated EOB)  UE Dressing: Moderate assistance(doff/don gown)  LE Dressing: Dependent/Total(doff/don brief)  Toileting: Dependent/Total(purewick and brief)  Additional Comments: Pt is limited in ADLs due to extreme weakness and wrist drop.   Tone RUE  RUE Tone: Hypotonic  Tone LUE  LUE Tone: Hypotonic  Coordination  Movements Are Fluid And Coordinated: No  Coordination and Movement description: Decreased speed;Fine motor impairments     Bed mobility  Rolling to Left: Minimal assistance  Rolling to Right: Minimal assistance  Supine to Sit: Maximum assistance  Sit to Supine: Maximum assistance  Scooting: Maximal assistance  Transfers  Transfer Comments: Did not attempt on this date due to L NWB status and no recliner available  Vision - Basic Assessment  Prior Vision: No visual deficits  Patient Visual Report: No visual complaint reported. Cognition  Overall Cognitive Status: Exceptions  Arousal/Alertness: Delayed responses to stimuli  Following Commands: Follows one step commands with increased time; Follows one step commands with repetition  Attention Span: Difficulty dividing attention; Attends with cues to redirect  Memory: Appears intact  Initiation: Does not require cues  Sequencing: Requires cues for some  Cognition Comment: Pt has flat affect with deficits in attention, arousal, and some command following. Pt received suboxone prior to session. Perception  Overall Perceptual Status: WFL     Sensation  Overall Sensation Status: Impaired  Light Touch: Partial deficits in the LLE;Partial deficits in the RLE;Partial deficits in the LUE;Partial deficits in the RUE        LUE PROM (degrees)  LUE PROM: WFL  LUE AROM (degrees)  LUE General AROM: severe wrist drop present; WFL shoulder and elbow ROM  Left Hand PROM (degrees)  Left Hand PROM: WFL  RUE PROM (degrees)  RUE PROM: WFL  RUE AROM (degrees)  RUE General AROM: severe wrist drop present; WFL shoulder and elbow ROM  Right Hand PROM (degrees)  Right Hand PROM: WFL  LUE Strength  L Hand General: 2/5  RUE Strength  R Hand General: 2/5                   Plan   Plan  Times per week: 3-5  Times per day: Daily  Current Treatment Recommendations: Strengthening, ROM, Balance Training, Endurance Training, Self-Care / ADL, Patient/Caregiver Education & Training    G-Code     OutComes Score                                                  AM-PAC Score             Goals  Short term goals  Time Frame for Short term goals: d/c  Short term goal 1: Pt will complete bed mobility mod A. Short term goal 2: Pt will complete functional transfer max A. Short term goal 3: Pt will complete UB ADLs with min A. Short term goal 4: Pt will complete LB ADLs with max A.   Short term goal 5: Pt will complete functional task seated EOB for 20 minutes with supervision for balance.   Long term goals  Time Frame for Long term goals : LTG=STG  Patient Goals   Patient goals : did not state       Therapy Time   Individual Concurrent Group Co-treatment   Time In 5258         Time Out 1004         Minutes 46         Timed Code Treatment Minutes: Dougie Gilmore 12, [address]

## 2020-09-22 NOTE — PROGRESS NOTES
Shift assessment completed and scheduled medications administered. Routine vital signs stable. PIV flushed without difficulty. Patient denies any further needs at this time. Fall precautions are in place and the call light is within reach. Will continue to monitor.

## 2020-09-22 NOTE — CARE COORDINATION
CM received call back from [facility] at [facility]. She states they are starting pre cert but since pt has been there for skilled and returning skilled she will call me back and let me know if she can be admitted later today with pre cert pending. Also verified pt only returning on oral Zyvox and no IV abx for facility. [facility] aware pt will be on oral Zyvox for 5 days and states [facility] covers it. CM met with patient and informed her of poc. She verbalized understanding.     Ibis Noel, RN, BSN  [phone]

## 2020-09-22 NOTE — PROGRESS NOTES
Physical Therapy    Facility/Department: [address] ONCOLOGY  Initial Assessment    NAME: Jonah Espitia  : 1975  MRN: [id number]    Date of Service: 2020    Discharge Recommendations: Jonah Espitia scored a  on the AM-PAC short mobility form. Current research shows that an AM-PAC score of 17 or less is typically not associated with a discharge to the patient's home setting. Based on the patient's AM-PAC score and their current functional mobility deficits, it is recommended that the patient have 3-5 sessions per week of Physical Therapy at d/c to increase the patient's independence. Please see assessment section for further patient specific details. If patient discharges prior to next session this note will serve as a discharge summary. Please see below for the latest assessment towards goals. PT Equipment Recommendations  Equipment Needed: Yes  Other: Pt stated she is working on getting an AFO and an electric w/c. Assessment   Body structures, Functions, Activity limitations: Decreased functional mobility ; Decreased ROM; Decreased strength;Decreased endurance;Decreased balance;Decreased sensation; Increased pain  Assessment: The pt presents with severe muscle atrophy in UEs and LEs, decreased sensation, decreased balance and decreased activity tolerance. She has decreased PROM of BLEs. She did not tolerate transferring to a chair today. Prognosis: Fair  Decision Making: Medium Complexity  PT Education: Goals; General Safety;PT Role;Plan of Care  Patient Education: pt verbalized understanding  REQUIRES PT FOLLOW UP: Yes  Activity Tolerance  Activity Tolerance: Patient Tolerated treatment well;Patient limited by fatigue  Activity Tolerance: pt fatigued quickly sitting EOB       Patient Diagnosis(es): The primary encounter diagnosis was Multiple subsegmental pulmonary emboli without acute cor pulmonale.  Diagnoses of Altered mental status, unspecified altered mental status type, Respiratory acidosis, Pneumonia due to organism, and Hypercarbia were also pertinent to this visit. has a past medical history of Adult failure to thrive, Allergic, Anemia, Anxiety, CHF (congestive heart failure) ([address] Chronic neck pain, Copper deficiency, Depression, Hypertension, Sciatica, Seizures ([address] and Vitamin B12 deficiency neuropathy ([address] has a past surgical history that includes Cholecystectomy; Gastric bypass surgery; Abdominoplasty; and Tunneled venous port placement (Right, 5/2/2014). Restrictions  Restrictions/Precautions  Restrictions/Precautions: Weight Bearing, Fall Risk  Required Braces or Orthoses?: Yes(boot)  Lower Extremity Weight Bearing Restrictions  Left Lower Extremity Weight Bearing: Non Weight Bearing  Position Activity Restriction  Other position/activity restrictions: Josse Caballero is a 50 y.o. 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 Patient was sent from [facility] as she was more drowsy and lethargic than normal.  Said patient is usually more awake and alert and conversive. She did have a recent leg fracture and therefore has a boot in place and ambulates in a wheelchair. Found to have R pulmonary emboli. ABG was concerning for severe hypercarbia, respiratory acidosis. Possibly due to pulmonary clot burden vs seizure. Intubated and extubated 9/18.   Vision/Hearing  Vision: Within Functional Limits  Hearing: Within functional limits     Subjective  General  Chart Reviewed: Yes  Patient assessed for rehabilitation services?: Yes  Family / Caregiver Present: No  Diagnosis: acute respiratory failure  Follows Commands: Within Functional Limits  Other (Comment): pt has a flat affect and limits interaction  General Comment  Comments: pt was supine in bed upon PT arrival, agreeable to PT  Subjective  Subjective: pt did not rate her pain intensity when asked, pt indicated L ankle pain with mobility  Pain Screening  Patient Currently in Pain: Yes  Pain Assessment  Pain Assessment: FLACC  Pain Level: 3  Pain Type: Chronic pain  Pain Location: Leg  Pain Orientation: Left  Vital Signs  Pulse: 78  Patient Currently in Pain: Yes  Oxygen Therapy  O2 Device: None (Room air)  FiO2 : 93 %       Orientation  Orientation  Overall Orientation Status: Within Functional Limits  Social/Functional History  Social/Functional History  Type of Home: Facility(rehab at [facility] for the last 2 months)  Home Equipment: Wheelchair-manual  ADL Assistance: Needs assistance(pt uses adapted utensils for eating, pt toilets in a depends and gets assistance for showering/bathing)  Homemaking Responsibilities: No  Ambulation Assistance: Needs assistance(non ambulatory, needs assistance to propel w/c)  Transfer Assistance: Needs assistance(maximal assist of 1 person for a squat pivot transfer)  Active : No  Leisure & Hobbies: \"none\"  Additional Comments: The pt has not ambulated in 9 months. She was admitted to [facility] for rehab 2 months ago after a fall that caused a tib/fib fracture. The pt fell at home and stated it was her 's fault. The pt plans to return to [facility] and ultimately find a place to live alone. Pt has had no other recent falls. Pt stated her extreme weakness is due to malnutrition.     Objective  PROM RLE (degrees)  RLE PROM: Exceptions  RLE General PROM: hamstring tightness causing pt to lack around 30 degrees of full knee extension, ankle DF to neutral, hip rotation and flexion ROM WFL, unsure if pt can extend hips beyond neutral  PROM LLE (degrees)  LLE PROM: Exceptions  LLE General PROM: hamstring tightness causing pt to lack around 45 degrees of full knee extension, ankle ROM not assessed due to pain, hip rotation and flexion ROM WFL, unsure if pt can extend hips beyond neutral  Strength RLE  Strength RLE: Exception  Comment: ankle DF 0/5, ankle PF 1/5, knee extension 3-/5  Strength LLE  Strength LLE: Exception  Comment: ankle DF 2/5, ankle PF 2/5, knee extension 3-/5  Strength RUE  Comment: severe atrophy in hands and wrists  Strength LUE  Comment: severe atrophy in hands and wrists  Tone RLE  RLE Tone: Normotonic  Tone LLE  LLE Tone: Normotonic  Sensation  Overall Sensation Status: Impaired  Light Touch: Partial deficits in the LLE;Partial deficits in the RLE;Partial deficits in the LUE;Partial deficits in the RUE  Bed mobility  Rolling to Left: Minimal assistance  Rolling to Right: Minimal assistance  Supine to Sit: Maximum assistance  Sit to Supine: Maximum assistance  Scooting: Maximal assistance  Transfers  Bed to Chair: Unable to assess(pt refused to complete today and no recliner chair was available)        Balance  Posture: Fair(rounded shoulders)  Sitting - Static: Fair(SBA sitting EOB for 10 minutes, pt relies on single UE support)  Sitting - Dynamic: -;[address]  Times per week: 3-5  Current Treatment Recommendations: Strengthening, ROM, Balance Training, Functional Mobility Training, Transfer Training, Neuromuscular Re-education, Endurance Training, Positioning, Safety Education & Training  Safety Devices  Type of devices: All fall risk precautions in place, Call light within reach, Bed alarm in place, Nurse notified, Left in bed      AM-PAC Score  AM-PAC Inpatient Mobility Raw Score : 9 (09/22/20 1024)  AM-PAC Inpatient T-Scale Score : 30.55 (09/22/20 1024)  Mobility Inpatient CMS 0-100% Score: 81.38 (09/22/20 1024)  Mobility Inpatient CMS G-Code Modifier : CM (09/22/20 1024)          Goals  Short term goals  Time Frame for Short term goals:  To be met prior to DC  Short term goal 1: Pt will perform bed mobility with mod A  Short term goal 2: Pt will sit upright for 20 minutes with SBA  Short term goal 3: Pt will complete a lateral transer from bed to a chair with max A.   Patient Goals   Patient goals : pt stated she wants to go back to [facility] and then ultimately live alone       Therapy Time   Individual Concurrent Group Co-treatment   Time In 0919         Time Out 1004         Minutes 45         Timed Code Treatment Minutes: Arianna [address], [place] DPT [id number]

## 2020-09-22 NOTE — CARE COORDINATION
Discharge order placed. Therapy evaluations completed this am. CM called Farzad Junior at [address] [place] [address] and notified of pt's negative covid test and that she is ready for d/c and asked if pre cert required per  note. She states she will review chart and call me back.      Lester Ruff RN, BSN  [phone]

## 2020-09-22 NOTE — CARE COORDINATION
CM notified by Seun Madrid at [facility] pt can return to facility pending pre cert. Informed pt not yet released by ortho and that transport is set up for noon tomorrow with [facility] in anticipation of d/c.      Jake Campos RN, BSN  [phone]

## 2020-09-22 NOTE — DISCHARGE INSTR - COC
Continuity of Care Form    Patient Name: Chrissy Cho   :  1975  MRN:  [id number]    Admit date:  2020  Discharge date:  ***    Code Status Order: Full Code   Advance Directives:   [facility] Documentation       Date/Time Healthcare Directive Type of Healthcare Directive Copy in [facility] [address] Agent's Name Healthcare Agent's Phone Number    20 1833  No, patient does not have an advance directive for healthcare treatment -- -- -- -- --            Admitting Physician:  Bridger Green MD  PCP: Referring Not In System (Inactive)    Discharging Nurse: [facility] Unit/Room#: 7IT-0647/9773-27  Discharging Unit Phone Number: ***    Emergency Contact:   Extended Emergency Contact Information  Primary Emergency Contact: [facility] Phone: [phone]  Relation: Parent  Secondary Emergency Contact: [facility]  Address: [address] [place] [address], [facility] [address]  Home Phone: [phone]  Relation: Domestic Partner    Past Surgical History:  Past Surgical History:   Procedure Laterality Date    ABDOMINOPLASTY      CHOLECYSTECTOMY      GASTRIC BYPASS SURGERY      TUNNELED VENOUS PORT PLACEMENT Right 2014    Dr. Irvin De Jesus       Immunization History: There is no immunization history on file for this patient.     Active Problems:  Patient Active Problem List   Diagnosis Code    Vitamin B12 deficiency E53.8    Intestinal malabsorption following gastrectomy K91.2, Z90.3    KETAN (iron deficiency anemia) D50.9    Copper deficiency myeloneuropathy ([address] E61.0, G99.2, G63    Acute respiratory failure ([address] J96.00    Closed nondisplaced transverse fracture of shaft of left tibia S82.225A    Closed fracture of left distal fibula S82.832A       Isolation/Infection:   Isolation            No Isolation          Patient Infection Status       Infection Onset Added Last Indicated Last Indicated By Review Planned Expiration Resolved YTPU:[id number]:::7}    Treatments at the Time of Hospital Discharge:   Respiratory Treatments: ***  Oxygen Therapy:  {Therapy; copd oxygen:59385:::0}  Ventilator:    {MH CC Vent List:[id number]:::0}    Rehab Therapies: {THERAPEUTIC INTERVENTION:[id number]}  Weight Bearing Status/Restrictions: Non weight bearing  on LEFT leg x 6 weeks  Other Medical Equipment (for information only, NOT a DME order):  {EQUIPMENT:256734473}  Other Treatments: ***    Patient's personal belongings (please select all that are sent with patient):  {CHP DME Belongings:908732898:::0}    RN SIGNATURE:  {Esignature:[id number]:::0}    CASE MANAGEMENT/SOCIAL WORK SECTION    Inpatient Status Date: 9/18/2020    Readmission Risk Assessment Score:  Readmission Risk              Risk of Unplanned Readmission:        11           Discharging to Facility/ Agency   · Name: [facility]  · [facility]  · Phone:[phone] for report  · Fax:[phone]    Dialysis Facility (if applicable)   · Name:  · Address:  · Dialysis Schedule:  · Phone:  · Fax:    / signature: Izzy Cooper RN, BSN  [phone]      PHYSICIAN SECTION    Prognosis: Fair    Condition at Discharge: Stable    Rehab Potential (if transferring to Rehab): Fair    Recommended Labs or Other Treatments After Discharge:   Continue Linezolid for 5days for Enterococcus UTI  Start on Eliquis 10mg BID for 7days then maintain on 5mg BID thereafter for 6 to 9months at PCP's descretion    Physician Certification: I certify the above information and transfer of Anna Fan  is necessary for the continuing treatment of the diagnosis listed and that she requires [facility] for greater 30 days.      Update Admission H&P: No change in H&P    PHYSICIAN SIGNATURE:  Electronically signed by Ricco Smart MD on 9/22/20 at 11:19 AM EDT

## 2020-09-22 NOTE — PROGRESS NOTES
[facility] PROGRESS NOTE    9/21/2020 8:15 PM        Name: Jay Wade . Admitted: 9/18/2020  Primary Care Provider: Referring Not In System (Inactive) (Tel: None)      Subjective:  . Patient is 50years old and was admitted from nursing home when she was found unresponsive, her past medical history significant for failure to thrive, chronic opiate use, history of Parish-en-Y gastric bypass, seizure, hepatitis C, Lilia-Danlos syndrome. She was given Suboxone recently on September 16 at nursing home, she was given Narcan, EMS was called and patient transferred to ER and admitted to ICU for further evaluation. She was initially intubated and extubated to the same day in the hospital.  CTA chest shows multiple right-sided pulmonary embolism. She was started on heparin GTT and she was switched to Lovenox twice daily.     Reviewed interval ancillary notes    Current Medications  buprenorphine-naloxone (SUBOXONE) 2-0.5 MG SL film 2 Film, Daily  vancomycin (VANCOCIN) 750 mg in dextrose 5 % 250 mL IVPB, Q12H  enoxaparin (LOVENOX) injection 60 mg, BID  iopamidol (ISOVUE-370) 76 % injection 75 mL, ONCE PRN  vitamin C (ASCORBIC ACID) tablet 500 mg, Daily  pantoprazole (PROTONIX) tablet 40 mg, QAM AC  sodium chloride flush 0.9 % injection 10 mL, 2 times per day  sodium chloride flush 0.9 % injection 10 mL, PRN  polyethylene glycol (GLYCOLAX) packet 17 g, Daily PRN  perflutren lipid microspheres (DEFINITY) injection 1.65 mg, ONCE PRN  potassium chloride 10 mEq/100 mL IVPB (Peripheral Line), PRN  magnesium sulfate 2 g in 50 mL IVPB premix, PRN  folic acid (FOLVITE) tablet 1 mg, Daily  therapeutic multivitamin-minerals 1 tablet, Daily  albuterol sulfate  (90 Base) MCG/ACT inhaler 2 puff, Q4H        Objective:  /66   Pulse 94   Temp 97.5 °F (36.4 °C) (Oral)   Resp 16   Ht 5' 7\" (1.702 m)   Wt 132 lb 4.4 oz (60 kg)   LMP 05/17/2014   SpO2 96%   BMI 20.72 kg/m²     Intake/Output Summary (Last 24 hours) at 9/21/2020 2015  Last data filed at 9/21/2020 1850  Gross per 24 hour   Intake 480 ml   Output 1658 ml   Net -1178 ml      Wt Readings from Last 3 Encounters:   09/21/20 132 lb 4.4 oz (60 kg)   02/09/17 153 lb (69.4 kg)   02/02/17 149 lb 12.8 oz (67.9 kg)       General appearance:  Appears comfortable  Eyes: Sclera clear. Pupils equal.  ENT: Moist oral mucosa. Trachea midline, no adenopathy. Cardiovascular: Regular rhythm, normal S1, S2. No murmur. No edema in lower extremities  Respiratory: Not using accessory muscles. Good inspiratory effort. Clear to auscultation bilaterally, no wheeze or crackles. GI: Abdomen soft, no tenderness, not distended, normal bowel sounds  Musculoskeletal: No cyanosis in digits, neck supple  Neurology: CN 2-12 grossly intact. No speech or motor deficits  Psych: Normal affect. Alert and oriented in time, place and person  Skin: Warm, dry, normal turgor    Labs and Tests:  CBC:   Recent Labs     09/19/20  0415 09/20/20  0412 09/21/20  0540   WBC 5.6 5.0 5.5   HGB 9.8* 10.2* 10.4*   * 137 149     BMP:    Recent Labs     09/19/20  0415 09/20/20  0412 09/21/20  0540    141 142   K 4.6 3.9 3.8    102 103   CO2 34* 35* 32   BUN 15 16 19   CREATININE <0.5* <0.5* <0.5*   GLUCOSE 115* 96 72     Hepatic: No results for input(s): AST, ALT, ALB, BILITOT, ALKPHOS in the last 72 hours. Discussed care with family  Discussed  with Dr. JOHN viewed CXR images and EKG tracings   Discussed film/CT with reading physician      Spent  minutes with patient and family at bedside and on unit reviewing medical records and labs, spent greater than 50% time counseling patient and family on  and/or coordinating care with     Problem List  Active Problems:    Acute respiratory failure ([address]  Resolved Problems:    * No resolved hospital problems.  *       Assessment & Plan:   Right-sided pulmonary embolism on CAT scan  Urinary tract infection, enterococcus  Chronic pancytopenia  Vitamin D deficiency  History of gastric bypass  History of copper deficiency  Opiate dependence  History of left malleolus fracture  History of Ehler's Danlos syndrome     Patient transferred to [address] [place] [address]  Switched from heparin to Lovenox twice daily  Neurology on board  Vitamin B1, B6, B12, TSH level, serum copper pending.   Pending MRI  Pending echo  Started vancomycin for urinary tract infection, enterococcus    COVID testing is negative      Diet: DIET GENERAL;  Code:Full Code  DVT PP      Davida Peña MD   9/21/2020 8:15 PM

## 2020-09-22 NOTE — PROGRESS NOTES
Routine vital signs stable. New PIV inserted in left AC. Scheduled medications administered. Stephanie-care provided and brief changed. Pure wick changed. Patient repositioned. Patient denies any further needs at this time. Fall precautions are in place and the call light is within reach. Will continue to monitor.

## 2020-09-22 NOTE — PROGRESS NOTES
Hospitalist Progress Note      PCP: Referring Not In System (Inactive)    Date of Admission: 9/18/2020    LOS: 4    Chief Complaint:   Chief Complaint   Patient presents with    Altered Mental Status     pt brought in by [place] [facility], pt was found unresponsive justin [place], pt was given Narcan 2mg nasal x1. pt is alert upon arrival.        Case Summary:   24-year-old lady with with a history of Parish-en-Y gastric bypass, Lilia-Danlos syndrome, seizure disorder, copper deficiency and B12 myelopathy, history of CHF, anxiety, history of substance abuse on Suboxone who is resident in a nursing home and was admitted from nursing home having been found unresponsive despite Narcan requiring brief intubation and ICU admission. Admission CT pulmonary angiogram was noted for multiple right-sided PE. She was started on anticoagulation. CT of the brain and MRI of the brain was unremarkable for acute pathology  Left ankle x-ray was noted for healing subacute fracture of the distal left tibia and fibula. She was complicated by enterococcus UTI and started on vancomycin IV to be switched to oral linezolid for 5 days at discharge      Active Hospital Problems    Diagnosis Date Noted    Pulmonary embolus ([facility] [I26.99] 09/22/2020    Enterococcus UTI [N39.0, B95.2] 09/22/2020    Closed nondisplaced transverse fracture of shaft of left tibia [S82.225A]     Closed fracture of left distal fibula [S82.832A]     Acute respiratory failure ([address] [J96.00] 09/18/2020    Copper deficiency myeloneuropathy ([facility] [E61.0, G99.2, G63] 05/12/2015         Principal Problem:    Pulmonary embolus [facility]): No shortness of breath this morning and appears comfortable. -She was started on a heparin which will switch to oral Eliquis.  -Thrombophilia screen  -Hematology consult      Enterococcus UTI: On vancomycin.   To switch to oral linezolid at discharge    Active Problems:    Closed nondisplaced transverse fracture of shaft of left tibia and fracture of left distal fibula: Seen by orthopedics.    -Will await input from orthopedics regarding management and future follow-ups prior to discharge. -PT OT evaluations in view of discharge planning  -Pain management    Copper deficiency myeloneuropathy with contractures    Resolved Problems:    Acute respiratory failure (HCC)          Medications:  Reviewed  Infusion Medications   Scheduled Medications    apixaban  10 mg Oral BID    buprenorphine-naloxone  2 Film Sublingual Daily    vancomycin  750 mg Intravenous Q12H    vitamin C  500 mg Oral Daily    pantoprazole  40 mg Oral QAM AC    sodium chloride flush  10 mL Intravenous 2 times per day    folic acid  1 mg Oral Daily    therapeutic multivitamin-minerals  1 tablet Oral Daily    albuterol sulfate HFA  2 puff Inhalation Q4H     PRN Meds: iopamidol, sodium chloride flush, polyethylene glycol, perflutren lipid microspheres, potassium chloride, magnesium sulfate      DVT Prophylaxis: Eliquis  Diet: DIET GENERAL;  Code Status: Full Code    Dispo: Anticipate discharge in the next 24hrs    ____________________________________________________________________________    Subjective:   Overnight Events:   Uneventful overnight  Await orthopedic input regarding ankle fracture management prior to discharge  Continue IV antibiotics to switch to oral at discharge      Physical Exam:  BP (!) 142/87   Pulse 74   Temp 98 °F (36.7 °C) (Axillary)   Resp 18   Ht 5' 7\" (1.702 m)   Wt 132 lb 8 oz (60.1 kg)   LMP 05/17/2014   SpO2 95%   BMI 20.75 kg/m²   General appearance: No apparent distress, appears stated age and cooperative. HEENT: Normocephalic, atraumatic, MMM, No sclera icterus/conjuctival palor  Neck: Supple, no thyromegally. No jugular venous distention. Respiratory:  Normal respiratory effort. Clear to auscultation, no Rales/Wheezes/Rhonchi. Cardiovascular: S1/S2 without murmurs, rubs or gallops.  RRR  Abdomen: Soft, non-tender, non-distended,

## 2020-09-22 NOTE — PROGRESS NOTES
Routine vitals stable. Scheduled medications given. Pt denies any further needs at this time. Call light within reach. Bed alarm on. Will continue to monitor.    Vitals:    09/22/20 1612   BP: 106/67   Pulse: 79   Resp: 16   Temp: 97.5 °F (36.4 °C)   SpO2: 95%

## 2020-09-23 NOTE — DISCHARGE SUMMARY
Hospital Medicine Discharge Summary    Patient ID: Josse Primes      Patient's PCP: Referring Not In System (Inactive)    Admit Date: 9/18/2020     Discharge Date:   9/23/2020     Admitting Physician: Eda Fraser MD     Discharge Physician: Lilia Gann MD     Discharge Diagnoses: Active Hospital Problems    Diagnosis    Pulmonary embolus ([facility] [I26.99]    Enterococcus UTI [N39.0, B95.2]    Closed nondisplaced transverse fracture of shaft of left tibia [S82.225A]    Closed fracture of left distal fibula [S82.832A]    Acute respiratory failure (HCC) [J96.00]    Copper deficiency myeloneuropathy ([facility] [E61.0, G99.2, G63]       The patient was seen and examined on day of discharge and this discharge summary is in conjunction with any daily progress note from day of discharge. Hospital Course:   69-year-old lady with with a history of Parish-en-Y gastric bypass, Lilia-Danlos syndrome, seizure disorder, copper deficiency and B12 myelopathy, history of CHF, anxiety, history of substance abuse on Suboxone who is resident in a nursing home and was admitted from nursing home having been found unresponsive despite Narcan requiring brief intubation and ICU admission. Admission CT pulmonary angiogram was noted for multiple right-sided PE. She was started on anticoagulation. CT of the brain and MRI of the brain was unremarkable for acute pathology  Left ankle x-ray was noted for healing subacute fracture of the distal left tibia and fibula. She was complicated by enterococcus UTI and started on vancomycin IV to be switched to oral linezolid for 5 days at discharge     Pulmonary embolus [facility]): Likely due to immobility. No shortness of breath this morning and appears comfortable. She was started on a heparin which will switch to oral Eliquis. Enterococcus UTI: On vancomycin.   Switched to oral linezolid at discharge to take for 5 days     Active Problems:    Closed nondisplaced transverse fracture of shaft of left tibia and fracture of left distal fibula: Seen by orthopedics. They discussed treatment options and opted for nonoperative treatment with nonweightbearing left ankle for 6 weeks. They discussed the risk of nonunion and/or malunion. Patient to follow-up with orthopedic clinic in 6 weeks at which time repeat x-rays will be done of the left ankle.  -Will await input from orthopedics regarding management and future follow-ups prior to discharge. Copper deficiency myeloneuropathy with contractures     Resolved Problems:    Acute respiratory failure (HCC)           Physical Exam Performed:     /70   Pulse 99   Temp 97.6 °F (36.4 °C) (Oral)   Resp 16   Ht 5' 7\" (1.702 m)   Wt 132 lb 8 oz (60.1 kg)   LMP 05/17/2014   SpO2 95%   BMI 20.75 kg/m²       General appearance:  No apparent distress, appears stated age and cooperative. HEENT:  Normal cephalic, atraumatic without obvious deformity. Pupils equal, round, and reactive to light. Extra ocular muscles intact. Conjunctivae/corneas clear. Neck: Supple, with full range of motion. No jugular venous distention. Trachea midline. Respiratory:  Normal respiratory effort. Clear to auscultation, bilaterally without Rales/Wheezes/Rhonchi. Cardiovascular:  Regular rate and rhythm with normal S1/S2 without murmurs, rubs or gallops. Abdomen: Soft, non-tender, non-distended with normal bowel sounds. Musculoskeletal:  No clubbing, cyanosis or edema bilaterally. Full range of motion without deformity. Skin: Skin color, texture, turgor normal.  No rashes or lesions. Neurologic:  Neurovascularly intact without any focal sensory/motor deficits. Cranial nerves: II-XII intact, grossly non-focal.  Psychiatric:  Alert and oriented, thought content appropriate, normal insight  Capillary Refill: Brisk,< 3 seconds   Peripheral Pulses: +2 palpable, equal bilaterally       Labs:  For convenience and continuity at follow-up the following most recent labs are provided:      CBC:    Lab Results   Component Value Date    WBC 5.2 09/23/2020    HGB 10.5 09/23/2020    HCT 32.0 09/23/2020     09/23/2020       Renal:    Lab Results   Component Value Date     09/23/2020    K 4.1 09/23/2020     09/23/2020    CO2 35 09/23/2020    BUN 10 09/23/2020    CREATININE <0.5 09/23/2020    CALCIUM 9.2 09/23/2020         Significant Diagnostic Studies    Radiology:   MRI BRAIN WO CONTRAST   Final Result   No evidence for acute infarct. White matter changes in the periventricular   white matter subcortical white matter         XR ANKLE LEFT (MIN 3 VIEWS)   Final Result   Healing/subacute fractures of the distal left tibia and fibula. The bones   are markedly osteopenic. XR CHEST 1 VIEW   Final Result   1. Endotracheal tube tip approximately 12 mm above the trixie   2. Persistent left basilar opacity could represent atelectasis         CT CHEST PULMONARY EMBOLISM W CONTRAST   Final Result   Multiple right-sided pulmonary emboli. Bibasilar segmental atelectasis versus pneumonia. Findings were discussed with Darlene Burr at 1:33 pm on 9/18/2020. CT Head WO Contrast   Final Result   1. No acute intracranial abnormality.          XR CHEST PORTABLE   Final Result   New left basilar opacity could represent atelectasis or left lower lobe   pneumonia                Consults:     IP CONSULT TO HOSPITALIST  IP CONSULT TO NEUROLOGY  IP CONSULT TO CRITICAL CARE  PHARMACY TO DOSE MEDICATION  PHARMACY TO DOSE VANCOMYCIN  IP CONSULT TO ORTHOPEDIC SURGERY    Disposition:  SNF     Condition at Discharge: Stable    Discharge Instructions/Follow-up:        Code Status:  Full Code     Activity: activity as tolerated    Diet: regular diet      Discharge Medications:     Current Discharge Medication List           Details   apixaban (ELIQUIS DVT/PE STARTER PACK) 5 MG TABS tablet Take 10 mg (2 tablets) orally twice daily for 7 days, then take 5 mg (1 tablet) orally twice daily thereafter. Qty: 74 tablet, Refills: 2      linezolid (ZYVOX) 600 MG tablet Take 1 tablet by mouth 2 times daily for 5 days  Qty: 10 tablet, Refills: 0              Details   QUEtiapine (SEROQUEL) 50 MG tablet Take 0.5 tablets by mouth nightly  Qty: 60 tablet, Refills: 3      folic acid (FOLVITE) 1 MG tablet Take 1 tablet by mouth daily  Qty: 30 tablet, Refills: 3              Details   buprenorphine-naloxone (SUBOXONE) 8-2 MG FILM SL film Place 1 Film under the tongue 2 times daily. vitamin B-12 (CYANOCOBALAMIN) 500 MCG tablet Take 500 mcg by mouth daily      promethazine (PHENERGAN) 25 MG tablet Take 25 mg by mouth every 12 hours      loperamide (IMODIUM) 2 MG capsule Take 2 mg by mouth every 12 hours      ibuprofen (ADVIL;MOTRIN) 800 MG tablet Take 800 mg by mouth every 12 hours as needed for Pain       pantoprazole (PROTONIX) 40 MG tablet Take 40 mg by mouth daily      busPIRone (BUSPAR) 5 MG tablet Take 5 mg by mouth 2 times daily       Calcium Carb-Cholecalciferol (CALCIUM 600-D PO) Take 1 tablet by mouth 2 times daily       Multiple Vitamins-Minerals (THERAPEUTIC MULTIVITAMIN-MINERALS) tablet Take 1 tablet by mouth daily      Ascorbic Acid (VITAMIN C) 500 MG CAPS Take 500 mg by mouth daily  Qty: 30 capsule, Refills: 3      cloNIDine (CATAPRES) 0.1 MG tablet Take 0.1 mg by mouth 2 times daily              Time Spent on discharge is more than 30 minutes in the examination, evaluation, counseling and review of medications and discharge plan. Signed: Lilia Gann MD   9/23/2020      Thank you Referring Not In System (Inactive) for the opportunity to be involved in this patient's care. If you have any questions or concerns please feel free to contact me at [phone].

## 2020-09-23 NOTE — ADT AUTH CERT
regarding management and future follow-ups prior to discharge.     -PT OT evaluations in view of discharge planning    -Pain management      Copper deficiency myeloneuropathy with contractures         Resolved Problems:      Acute respiratory failure (HCC)            Abnormal labs:  Hgb 10.4    Creatinine <0.5       Meds:    ·  apixaban, 10 mg, Oral, BID    ·  buprenorphine-naloxone, 2 Film, Sublingual, Daily    ·  vancomycin, 750 mg, Intravenous, Q12H    ·  vitamin C, 500 mg, Oral, Daily    ·  pantoprazole, 40 mg, Oral, QAM AC    ·  sodium chloride flush, 10 mL, Intravenous, 2 times per day    ·  folic acid, 1 mg, Oral, Daily    ·  therapeutic multivitamin-minerals, 1 tablet, Oral, Daily    ·  albuterol sulfate HFA, 2 puff, Inhalation, Q4H                 Pulmonary Embolism - Care Day 3 (9/20/2020) by Lobo Pringle RN         Review Status  Review Entered    Completed  9/22/2020 13:20        Criteria Review       Care Day: 3 Care Date: 9/20/2020 Level of Care:    Guideline Day 3    Level Of Care    (X) ICU or floor    9/22/2020 1:20 PM EDT by Enrigmart See      oncology    Clinical Status    (X) * No bleeding or evidence of new emboli    (X) * Oxygenation at baseline or improved    (X) * Dangerous arrhythmia absent    (X) * Pain absent or managed    Activity    (X) * Ambulatory    Routes    (X) Oral hydration, medications    (X) Usual diet    Interventions    (X) Possible PTT    (X) Possible PT/INR    (X) Pulse oximetry    (X) Possible cardiac monitoring    9/22/2020 1:20 PM EDT by Enrigue See      tele    Medications    (X) Anticoagulants [I]    * Milestone    Additional Notes    9/21/2020       Care day 3       Oncology       VS: 97.5 16 94 100/66  96% RA       Internal medicine progress  note:    50years old and was admitted from nursing home when she was found unresponsive, her past medical history significant for failure to thrive, chronic opiate use, history of Parish-en-Y gastric

## 2020-09-23 NOTE — PLAN OF CARE
Problem: Falls - Risk of:  Goal: Will remain free from falls  Description: Will remain free from falls  9/23/2020 1220 by Babar Silva RN  Outcome: Completed  9/22/2020 2306 by Miguel Angel Peres RN  Outcome: Ongoing  Goal: Absence of physical injury  Description: Absence of physical injury  9/23/2020 1220 by Babar Silva RN  Outcome: Completed  9/22/2020 2306 by Miguel Angel Peres RN  Outcome: Ongoing     Problem: Skin Integrity:  Goal: Will show no infection signs and symptoms  Description: Will show no infection signs and symptoms  9/23/2020 1220 by Babar Silva RN  Outcome: Completed  9/22/2020 2306 by Miguel Angel Peres RN  Outcome: Ongoing  Goal: Absence of new skin breakdown  Description: Absence of new skin breakdown  9/23/2020 1220 by Babar Silva RN  Outcome: Completed  9/22/2020 2306 by Miguel Angel Peres RN  Outcome: Ongoing     Problem: Pain:  Description: Pain management should include both nonpharmacologic and pharmacologic interventions.   Goal: Pain level will decrease  Description: Pain level will decrease  9/23/2020 1220 by Babar Silva RN  Outcome: Completed  9/22/2020 2306 by Miguel Angel Peres RN  Outcome: Ongoing  Goal: Control of acute pain  Description: Control of acute pain  9/23/2020 1220 by Babar Silva RN  Outcome: Completed  9/22/2020 2306 by Miguel Angel Peres RN  Outcome: Ongoing  Goal: Control of chronic pain  Description: Control of chronic pain  9/23/2020 1220 by Babar Silva RN  Outcome: Completed  9/22/2020 2306 by Miguel Angel Peres RN  Outcome: Ongoing     Problem: Musculor/Skeletal Functional Status  Goal: Highest potential functional level  9/23/2020 1220 by Babar Silva RN  Outcome: Completed  9/22/2020 2306 by Miguel Angel Peres RN  Outcome: Ongoing  Goal: Absence of falls  9/23/2020 1220 by Babar Silva RN  Outcome: Completed  9/22/2020 2306 by Miguel Angel Peres RN  Outcome: Ongoing

## 2020-09-23 NOTE — CARE COORDINATION
CM called [facility] Blood at [place] [id number] and checked again that eliquis and zyvox pt being discharged on is covered under her type of caresource and it is. Discharge Plan:     Patient discharged to: [facility]   SW/VICENTE Planner faxed, [address] and AVS to:[phone]  Narcotic Prescriptions faxed were:n/a  RN: Danette Burdick  will call report to:     [phone]  Medical Transport with: [facility]  [phone]   time:noon today  Family advised of discharge?: no pt states she called her family   HENS Submitted?:  Not required pt came from facility   All discharge needs met per case management.     Yesy Espinoza RN, BSN  [phone]

## 2020-09-23 NOTE — CARE COORDINATION
Cm called and spoke to [facility] at [address] [place] [address] Road and informed transport is still set for noon. CM will faxed dajuan/avs when completed.      Nelsy Godfrey RN, BSN  [phone]

## 2020-09-23 NOTE — PROGRESS NOTES
Patients head to toe assessment completed. Vital signs WNL. Bed alarm engaged, call light within reach. Scheduled medications given per MAR. Patient denies any pain at the moment. Patient resting in bed. Will continue to monitor.

## 2020-11-04 PROBLEM — R41.89 UNRESPONSIVE: Status: ACTIVE | Noted: 2020-01-01

## 2020-11-04 NOTE — ED NOTES
Assume patient care at this time. Agree with previous assessment. Pt resps easy and even. Currently intubated, propofol infusing, pt sedated and seems comfortable. Bed locked and in low position, with side rails up x 2.       Johnson Magallon RN  11/04/20 [id number]

## 2020-11-04 NOTE — CARE COORDINATION
Discharge Planning Assessment     discharge planner met with patient to discuss reason for admission, current living situation, and potential needs at the time of discharge. Pt in ED d/t acute respiratory distress    Demographics/Insurance verified:  Yes    Current type of dwelling:  LTC at [facility]. Patient from ECF/ confirmed with:  Terry in admissions. Terry state pt has been declining rapidly. Pt has lost the use of her hands and unable to transfer herself at this time. Pt is now LTC. Living arrangements:  LTC     Level of function/Support:  Pt currently intubated. Tentative discharge plan:  [facility]. Terry needs new therapy evals as she will be coming back SNF. Stated we do not need to wait for [facility] since she's LTC now. Transportation at the time of discharge: Will need ambulance transport back.       Electronically signed by TOM Lu, AIDAN on 11/4/2020 at 6:44 PM

## 2020-11-04 NOTE — ED NOTES
PT awake in CT grabbing OETT, Propofol gtt as charted. PT remains in bilat UE restraints for safety.      Kenroy Sibley RN  11/04/20 9359

## 2020-11-04 NOTE — PROGRESS NOTES
11/04/20 1354   Vent Patient Data   Plateau Pressure 12 [id number]   Static Compliance 28.6 mL/cmH2O   Dynamic Compliance 43 mL/cmH2O   Total PEEP 6 cmH20   Auto PEEP 1 cmH20

## 2020-11-04 NOTE — H&P
Hospital Medicine History & Physical      PCP: Referring Not In System (Inactive)    Date of Admission: 11/4/2020    Date of Service: Pt seen/examined on 11/4/2020 and Admitted to Inpatient     Chief Complaint: Unresponsive at facility      History Of Present Illness:      Sheree Mendes is a 50 y.o. female presents to the emergency department today for evaluation for altered mental status and unresponsiveness. The patient is from [facility], and the only history that we are able to obtain is that she is there for drug rehab. The patient does have a history of adult failure to thrive, CHF, hypertension. Unsure of the last seen normal, per EMS report, nursing home staff went in to see the patient today, and they noticed that she was unresponsive. Patient is a full code. Apparently has been tested positive for COVID-19. We do not have this test result in the chart and we were not able to get a hold of [facility] to confirm when this actually was done No other history is able to be obtained at this time. The patient is intubated and sedated at the time of my evaluation.     Past Medical History:        Diagnosis Date    Adult failure to thrive     Allergic     Anemia     related to gastric bypass, followed by Dr. Vika Martinez CHF [facility]congestive [facility] failure) [facility]     Chronic neck pain     Copper deficiency     Depression     Hypertension     Sciatica     Seizures ([facility] 3/2014    hypoglycemia and/or benzo withdrawal    Vitamin B12 deficiency neuropathy ([facility]        Past Surgical History:        Procedure Laterality Date    ABDOMINOPLASTY      CHOLECYSTECTOMY      GASTRIC BYPASS SURGERY      TUNNELED VENOUS PORT PLACEMENT Right 5/2/2014    Dr. Lisandro Alberts       Medications Prior to Admission:    Prior to Admission medications    Medication Sig Start Date End Date Taking? Authorizing Provider   apixaban (ELIQUIS DVT/PE STARTER PACK) 5 MG TABS tablet Take 10 mg (2 tablets) orally twice daily for 7 days, then take 5 mg (1 tablet) orally twice daily thereafter. 9/22/20   Aaliyah Armendariz MD   QUEtiapine (SEROQUEL) 50 MG tablet Take 0.5 tablets by mouth nightly 9/22/20   Aaliyah Armendariz MD   folic acid (FOLVITE) 1 MG tablet Take 1 tablet by mouth daily 9/22/20   Aaliyah Armendariz MD   buprenorphine-naloxone (SUBOXONE) 8-2 MG FILM SL film Place 1 Film under the tongue 2 times daily. Historical Provider, MD   vitamin B-12 (CYANOCOBALAMIN) 500 MCG tablet Take 500 mcg by mouth daily    Historical Provider, MD   promethazine (PHENERGAN) 25 MG tablet Take 25 mg by mouth every 12 hours    Historical Provider, MD   loperamide (IMODIUM) 2 MG capsule Take 2 mg by mouth every 12 hours    Historical Provider, MD   ibuprofen (ADVIL;MOTRIN) 800 MG tablet Take 800 mg by mouth every 12 hours as needed for Pain     Historical Provider, MD   pantoprazole (PROTONIX) 40 MG tablet Take 40 mg by mouth daily    Historical Provider, MD   busPIRone (BUSPAR) 5 MG tablet Take 5 mg by mouth 2 times daily     Historical Provider, MD   Calcium Carb-Cholecalciferol (CALCIUM 600-D PO) Take 1 tablet by mouth 2 times daily     Historical Provider, MD   Multiple Vitamins-Minerals (THERAPEUTIC MULTIVITAMIN-MINERALS) tablet Take 1 tablet by mouth daily    Historical Provider, MD   Ascorbic Acid (VITAMIN C) 500 MG CAPS Take 500 mg by mouth daily 2/14/17   Leeanna Farah MD   cloNIDine (CATAPRES) 0.1 MG tablet Take 0.1 mg by mouth 2 times daily     Historical Provider, MD       Allergies:  Acetaminophen; Codeine; Morphine; Penicillins; Morphine and related; and Ondansetron hcl    Social History:  The patient currently lives at [facility]  where she is there for drug rehab    TOBACCO:   reports that she has quit smoking. Her smoking use included cigarettes. She smoked 1.00 pack per day. for pulmonary embolus. 2. Bibasilar atelectasis versus pneumonia. EKG:  I have reviewed the EKG with the following interpretation:    Normal sinus rhythmNormal ECG         CBC   Recent Labs     11/04/20 0925   WBC 4.3   HGB 11.0*   HCT 35.9*         RENAL  Recent Labs     11/04/20 0925 11/04/20  1205     --    K 5.9* 3.9   CL 97*  --    CO2 40*  --    BUN 16  --    CREATININE <0.5*  --      LFT'S  Recent Labs     11/04/20 0925   AST 74*   ALT 54*   BILITOT 1.0   ALKPHOS 161*     COAG  Recent Labs     11/04/20 0925   INR 1.60*     CARDIAC ENZYMES  Recent Labs     11/04/20 0925   CKTOTAL 25*   TROPONINI <0.01       U/A:    Lab Results   Component Value Date    COLORU YELLOW 11/04/2020    WBCUA 3 11/04/2020    RBCUA 7 11/04/2020    BACTERIA 3+ 09/18/2020    CLARITYU CLOUDY 11/04/2020    SPECGRAV 1.023 11/04/2020    LEUKOCYTESUR Negative 11/04/2020    BLOODU Negative 11/04/2020    GLUCOSEU Negative 11/04/2020       ABG    Lab Results   Component Value Date    XMF2CKY 41.4 11/04/2020    BEART 13.7 11/04/2020    J2PNADMY 98.5 11/04/2020    PHART 7.376 11/04/2020    HHW6WCZ 70.0 11/04/2020    PO2ART 97.7 11/04/2020    JCU5NKC 97.5 11/04/2020           Active Hospital Problems    Diagnosis Date Noted    Unresponsive [R41.89] 11/04/2020         PHYSICIANS CERTIFICATION:    I certify that Nisa Vaughn is expected to be hospitalized for greater than than 2 midnights based on the following assessment and plan:      ASSESSMENT/PLAN:    Unresponsive/obtunded  -No clear cause at this point as the patient was at [facility] for drug rehab we suspect that this is probably an accidental overdose. However her tox screen is negative at this time. In the emergency department she received 3 doses of Narcan with no response.     Acute hypercarbic respiratory failure  -Intubated due to obtunded status and inability to protect airway  -No evidence of hypoxia that I am able to see from her vitals.  -Her initial PCO2 on blood gas however was in the high 90s at 97    Hyperkalemia  -Cause is unclear as her renal function is normal  -She does not appear to be taking any potassium supplements  -We will likely improve with IV fluids  -We will monitor    Obtunded  -No clear cause at this time initial suspicion was for possible overdose  -Urine tox is negative for drugs of abuse however other drugs are still a possibility salicylate and Tylenol levels are also within normal limits  -CT of the head is normal    COVID-19  -She is supposedly COVID-19 positive we will have to reswab her as we do not have a result from the nursing facility and so far have been unsuccessful in getting a hold of them. Currently her imaging not appear bad she is not on any steroids she will not be getting plasma or remdesivir at this time    DVT Prophylaxis: Lovenox  Diet: No diet orders on file  Code Status: Prior  PT/OT Eval Status: Evaluate and treat    Dispo -patient is being admitted to the intensive care unit in the inpatient service as she is intubated and sedated. Raymond the pulmonary critical care has been placed not currently requiring any pressors. 33 minutes of critical care time spent       Americo Vazquez MD    Thank you Referring Not In System (Inactive) for the opportunity to be involved in this patient's care. If you have any questions or concerns please feel free to contact me at [phone].

## 2020-11-04 NOTE — ED NOTES
Jamee Ayala, Dr Davie Mcnair, medics, Dimitry Salazar RT and myself at bedside. PT prepped for intubation. Crash cart at bedside.        Mireya Leslie, RN  11/04/20 Randy Schwab, RN  11/04/20 0109

## 2020-11-04 NOTE — ED PROVIDER NOTES
Depression     Hypertension     Sciatica     Seizures ([facility] [facility] 3/2014    hypoglycemia and/or benzo withdrawal    Vitamin B12 deficiency neuropathy ([facility]          SURGICAL HISTORY     Past Surgical History:   Procedure Laterality Date    ABDOMINOPLASTY      CHOLECYSTECTOMY      GASTRIC BYPASS SURGERY      TUNNELED VENOUS PORT PLACEMENT Right 5/2/2014    Dr. Roberto Truong       Previous Medications    APIXABAN (ELIQUIS DVT/PE STARTER PACK) 5 MG TABS TABLET    Take 10 mg (2 tablets) orally twice daily for 7 days, then take 5 mg (1 tablet) orally twice daily thereafter. ASCORBIC ACID (VITAMIN C) 500 MG CAPS    Take 500 mg by mouth daily    BUPRENORPHINE-NALOXONE (SUBOXONE) 8-2 MG FILM SL FILM    Place 1 Film under the tongue 2 times daily.     BUSPIRONE (BUSPAR) 5 MG TABLET    Take 5 mg by mouth 2 times daily     CALCIUM CARB-CHOLECALCIFEROL (CALCIUM 600-D PO)    Take 1 tablet by mouth 2 times daily     CLONIDINE (CATAPRES) 0.1 MG TABLET    Take 0.1 mg by mouth 2 times daily     FOLIC ACID (FOLVITE) 1 MG TABLET    Take 1 tablet by mouth daily    IBUPROFEN (ADVIL;MOTRIN) 800 MG TABLET    Take 800 mg by mouth every 12 hours as needed for Pain     LOPERAMIDE (IMODIUM) 2 MG CAPSULE    Take 2 mg by mouth every 12 hours    MULTIPLE VITAMINS-MINERALS (THERAPEUTIC MULTIVITAMIN-MINERALS) TABLET    Take 1 tablet by mouth daily    PANTOPRAZOLE (PROTONIX) 40 MG TABLET    Take 40 mg by mouth daily    PROMETHAZINE (PHENERGAN) 25 MG TABLET    Take 25 mg by mouth every 12 hours    QUETIAPINE (SEROQUEL) 50 MG TABLET    Take 0.5 tablets by mouth nightly    VITAMIN B-12 (CYANOCOBALAMIN) 500 MCG TABLET    Take 500 mcg by mouth daily         ALLERGIES     Acetaminophen; Codeine; Morphine; Penicillins; Morphine and related; and Ondansetron hcl    FAMILYHISTORY       Family History   Problem Relation Age of Onset    Dementia Mother     High Blood Pressure Mother     Thyroid Cancer Mother     Heart Disease Father     Stroke Father     Heart Attack Father     Thyroid Cancer Maternal Grandmother     High Blood Pressure Maternal Grandmother     Heart Disease Maternal Grandmother     Lung Cancer Paternal Grandfather           SOCIAL HISTORY       Social History     Tobacco Use    Smoking status: Former Smoker     Packs/day: 1.00     Types: Cigarettes    Smokeless tobacco: Never Used   Substance Use Topics    Alcohol use: No    Drug use: No       SCREENINGS             PHYSICAL EXAM    (up to 7 for level 4, 8 or more for level 5)     ED Triage Vitals   BP Temp Temp Source Pulse Resp SpO2 Height Weight   11/04/20 0915 11/04/20 0915 11/04/20 0915 11/04/20 0915 -- 11/04/20 0930 -- 11/04/20 0941   103/64 98.6 °F (37 °C) Temporal 113  96 %  136 lb 11 oz (62 kg)       Physical Exam  Vitals signs and nursing note reviewed. Constitutional:       General: She is in acute distress. Appearance: She is well-developed. HENT:      Head: Normocephalic and atraumatic. Right Ear: External ear normal.      Left Ear: External ear normal.      Nose: Nose normal.   Eyes:      General:         Right eye: No discharge. Left eye: No discharge. Comments: Pupils are unresponsive bilaterally   Neck:      Musculoskeletal: Normal range of motion and neck supple. Trachea: No tracheal deviation. Cardiovascular:      Rate and Rhythm: Normal rate and regular rhythm. Heart sounds: No murmur. Pulmonary:      Effort: Pulmonary effort is normal.   Abdominal:      General: Bowel sounds are normal. There is no distension. Palpations: Abdomen is soft. Musculoskeletal: Normal range of motion. Skin:     General: Skin is warm and dry. Neurological:      Mental Status: She is unresponsive.    Psychiatric:         Behavior: Behavior normal.         DIAGNOSTIC RESULTS   LABS:    Labs Reviewed   CBC WITH AUTO DIFFERENTIAL - Abnormal; Notable for the following components:       Result Value    RBC 3.54 (*) Hemoglobin 11.0 (*)     Hematocrit 35.9 (*)     .4 (*)     MCHC 30.5 (*)     Lymphocytes Absolute 0.2 (*)     All other components within normal limits    Narrative:     Performed at:  [facility] [address] [employer]   Phone [phone]   COMPREHENSIVE METABOLIC PANEL - Abnormal; Notable for the following components:    Potassium 5.9 (*)     Chloride 97 (*)     CO2 40 (*)     Anion Gap 1 (*)     Glucose 119 (*)     CREATININE <0.5 (*)     Total Protein 5.2 (*)     Alb 2.6 (*)     Albumin/Globulin Ratio 1.0 (*)     Alkaline Phosphatase 161 (*)     ALT 54 (*)     AST 74 (*)     All other components within normal limits    Narrative:     Performed at:  [facility] [address] [place]   Phone [phone]   PROTIME-INR - Abnormal; Notable for the following components:    Protime 18.6 (*)     INR 1.60 (*)     All other components within normal limits    Narrative:     Performed at:  [facility] [address], [place]   Phone [phone]   BRAIN NATRIURETIC PEPTIDE - Abnormal; Notable for the following components:    Pro- (*)     All other components within normal limits    Narrative:     Performed at:  [facility] [address], [place]   Phone [phone]   URINE RT REFLEX TO CULTURE - Abnormal; Notable for the following components:    Clarity, UA CLOUDY (*)     Bilirubin Urine SMALL (*)     Protein, UA 30 (*)     Urobilinogen, Urine 4.0 (*)     All other components within normal limits    Narrative:     Performed at:  [facility] [address], [place]   Phone [phone]   CK - Abnormal; Notable for the following components:     Total CK 25 (*)     All other components within normal limits    Narrative:     Performed at:  [facility]  [address]  [place], [facility] [place]   Phone [phone]   BLOOD GAS, ARTERIAL - Abnormal; Notable for the following components:    pCO2, Arterial 70.0 (*)     HCO3, Arterial 41.4 (*)     Base Excess, Arterial 13.7 (*)     Hemoglobin, Art, Extended 10.2 (*)     Carboxyhgb, Arterial 3.9 (*)     All other components within normal limits    Narrative:     Performed at:  [facility] [address],  [place], [facility] [place]   Phone [phone]   MICROSCOPIC URINALYSIS - Abnormal; Notable for the following components:    Hyaline Casts, UA 13 (*)     RBC, UA 7 (*)     All other components within normal limits    Narrative:     Performed at:  [facility] [address],  [place], [facility] [place]   Phone [phone]   ACETAMINOPHEN LEVEL - Abnormal; Notable for the following components:    Acetaminophen Level <5 (*)     All other components within normal limits    Narrative:     Performed at:  [facility] [address],  [place], [facility] [place]   Phone [phone]   SALICYLATE LEVEL - Abnormal; Notable for the following components:    Salicylate, Serum <3.5 (*)     All other components within normal limits    Narrative:     Performed at:  [facility] [address], [place], [facility] [place]   Phone [phone]   CULTURE, BLOOD 1   CULTURE, BLOOD 2   TROPONIN    Narrative:     Performed at:  [facility] [address],  [place], [facility] [place]   Phone [phone]   APTT    Narrative:     Performed at:  [facility] [address],  [place], [employer]   Phone [phone]   LACTATE, SEPSIS    Narrative:     Performed at:  [facility] [address]  [place], [place]   Phone [phone]   HCG, SERUM, QUALITATIVE    Narrative:     Performed at:  [facility]  [address], [address]   Phone [phone]   POTASSIUM    Narrative:     Performed at:  [facility]  [address], [address]   Phone [phone]   URINE DRUG SCREEN    Narrative:     Performed at:  [facility]  [address], [address]   Phone [phone]   ETHANOL    Narrative:     Performed at:  [facility]  [address] [place],  [place], [address]   Phone [phone]   PROCALCITONIN    Narrative:     Performed at:  [facility]  [address] [place],  [place], [address]   Phone [phone]   LACTATE, SEPSIS   COVID-19       All other labs were within normal range or not returned as of this dictation. EKG: All EKG's are interpreted by the Emergency Department Physician in the absence of a cardiologist.  Please see their note for interpretation of EKG. RADIOLOGY:   Non-plain film images such as CT, Ultrasound and MRI are read by the radiologist. Plain radiographic images are visualized and preliminarily interpreted by the ED Provider with the below findings:        Interpretation per the Radiologist below, if available at the time of this note:    CT CHEST PULMONARY EMBOLISM W CONTRAST   Final Result   1. Negative for pulmonary embolus. 2. Bibasilar atelectasis versus pneumonia. CT HEAD WO CONTRAST   Final Result   No acute intracranial abnormality. Stable compared to prior study         XR CHEST PORTABLE   Final Result   New small to moderate size right pleural effusion. New right lower lobe airspace disease due to atelectasis versus pneumonia at   the diaphragm. Unchanged atelectasis in the left lung base.            Ct Head Wo Contrast    Result Date: 11/4/2020  EXAMINATION: CT OF THE HEAD WITHOUT CONTRAST  11/4/2020 9:57 am TECHNIQUE: CT of the head was performed without the OF THE CHEST 11/4/2020 11:59 am TECHNIQUE: CTA of the chest was performed after the administration of intravenous contrast.  Multiplanar reformatted images are provided for review. MIP images are provided for review. Dose modulation, iterative reconstruction, and/or weight based adjustment of the mA/kV was utilized to reduce the radiation dose to as low as reasonably achievable. COMPARISON: 09/18/2020 HISTORY: ORDERING SYSTEM PROVIDED HISTORY: r/o pe TECHNOLOGIST PROVIDED HISTORY: Reason for exam:->r/o pe FINDINGS: Pulmonary Arteries: Pulmonary arteries are adequately opacified for evaluation. No evidence of intraluminal filling defect to suggest pulmonary embolism. Main pulmonary artery is normal in caliber. Mediastinum: No evidence of mediastinal lymphadenopathy. The heart and pericardium demonstrate no acute abnormality. There is no acute abnormality of the thoracic aorta. Lungs/pleura: There Is segmental consolidation within the bilateral lung bases. There are trace bilateral pleural effusions. Upper Abdomen: Limited images of the upper abdomen are unremarkable. Soft Tissues/Bones: No acute bone or soft tissue abnormality. 1. Negative for pulmonary embolus. 2. Bibasilar atelectasis versus pneumonia. PROCEDURES   Unless otherwise noted below, none     Procedures    CRITICAL CARE TIME   Critical Care  There was a high probability of life-threatening clinical deterioration in the patient's condition requiring my urgent intervention. Total critical care time with the patient was 40 minutes excluding separately reportable procedures. Critical care required due to patients respiratory distress requiring intubation, altered mental status requiring multiple lab work, imaging study and admission to ICU.       CONSULTS:  IP CONSULT TO PULMONOLOGY      EMERGENCY DEPARTMENT COURSE and DIFFERENTIAL DIAGNOSIS/MDM:   Vitals:    Vitals:    11/04/20 1230 11/04/20 1300 11/04/20 1354 11/04/20 1432   BP: 125/80 124/80     Pulse: 76 74 75    Temp:       TempSrc:       SpO2: 97% 97% 98% 98%   Weight:           Patient was given the following medications:  Medications   propofol injection (80 mcg/kg/min × 62 kg Intravenous New Bag 11/4/20 1252)   0.9 % sodium chloride infusion ( Intravenous New Bag 11/4/20 1249)   naloxone (NARCAN) 2 MG/2ML injection (2 mg  Given 11/4/20 0906)   etomidate (AMIDATE) injection 20 mg (20 mg Intravenous Given 11/4/20 0912)   succinylcholine (ANECTINE) injection 120 mg (120 mg Intravenous Given 11/4/20 0913)   0.9 % sodium chloride bolus (0 mLs Intravenous Stopped 11/4/20 1224)   dexamethasone (PF) (DECADRON) injection 10 mg (10 mg Intravenous Given 11/4/20 1249)   iopamidol (ISOVUE-370) 76 % injection 75 mL (75 mLs Intravenous Given 11/4/20 1204)   levoFLOXacin (LEVAQUIN) 750 MG/150ML infusion 750 mg (750 mg Intravenous New Bag 11/4/20 1300)           Briefly, this is a 78-year-old female who presents to the emergency department today from the nursing home for evaluation for altered mental status. Patient is apparently at [address] for drug rehab. Unsure of last seen normal, patient was found unresponsive by staff, was sent to the ED. Patient arrives to the ED she does have a pulse, she does not appear to be breathing on her own, patient was then intubated by my attending, please see his note for further details. Patient does have a history of of drug use, multiple doses of Narcan were given, with no response. Her pupils are unresponsive at this time    EKG is documented by my attending, please see his note for further details. Patient does have a port in for access. Her CBC shows no evidence of leukocytosis, she does have a mild anemia. CMP does show a potassium of 5.9, this is following intubation using succinylcholine, fluids were given, t is repeated and is 3.9 . Lactic acid is normal.  Pregnancy is negative.     Her drug screen is negative, tox screen is otherwise negative. Imaging was obtained and is relatively unremarkable. She does have atelectasis versus possible pneumonia. And although this could be due to COVID-19, as she did come in unresponsive, which she was not protecting her airway she was given Levaquin to cover for any aspiration pneumonia. The patient will need to be admitted to the ICU for further care and evaluation. Patient is stable for admission at this time      FINAL IMPRESSION      1. Acute respiratory distress    2. COVID-19    3. Hyperkalemia          DISPOSITION/PLAN   DISPOSITION Admitted 11/04/2020 01:14:18 PM      PATIENT REFERREDTO:  No follow-up provider specified.     DISCHARGE MEDICATIONS:  New Prescriptions    No medications on file       DISCONTINUED MEDICATIONS:  Discontinued Medications    No medications on file              (Please note that portions of this note were completed with a voice recognition program.  Efforts were made to edit the dictations but occasionally words are mis-transcribed.)    Gibson Patricia PA-C (electronically signed)            Gibson Patricia PA-C  11/04/20 5469

## 2020-11-04 NOTE — ED NOTES
PT arrived via EMS from [facility] unresponsive, PT COVID+, PT with pulse being bagged. PT given 2MG nasal narcan with no response. PT received IM narcan en route. PT port accessed.       Micheal Liu RN  11/04/20 [address] Ritu Sawyer RN  11/04/20 9120

## 2020-11-04 NOTE — ED NOTES
PT opening eyes grabbing at OETT with both hands. Propofol gtt increased, soft BUE restraints placed on PT for PT safety. Will continue to monitor.      Micheal Liu RN  11/04/20 4024

## 2020-11-04 NOTE — ED NOTES
Hourly rounding on pt., Pt breathing easy without distress, pt appears comfortable, denies any additional needs or concerns at this time. Pt appears comfortable. Restraints in place no issues. Gave mother an update on her condition.      Collin Fraga RN  11/04/20 0723

## 2020-11-04 NOTE — ED NOTES
Report given to [facility], bedside handoff of gtts infusing. Order for propofol to be updated to include titration parameters.        Meli Redman RN  11/04/20 1550

## 2020-11-04 NOTE — ED NOTES
PT intubated with 7.5 OETT +color change       Meli Redman RN  11/04/20 [address] [place] [id number], RN  11/04/20 8875

## 2020-11-04 NOTE — ED PROVIDER NOTES
I independently performed a history and physical on Darryl Rivera. All diagnostic, treatment, and disposition decisions were made by myself in conjunction with the advanced practice provider. Briefly, this is a 50 y.o. female here for altered mental status. Patient arrives severely obtunded and not responsive, history is obtained from EMS. Per EMS patient is resident of rehabilitation facility due to opiate abuse. Facility is concerned that she may have again overdosed on opiates however this is unclear. Patient received 2 mg Narcan in the field without any response. EMS notes that she was diagnosed with [address] today. No other historians are currently available. On exam, Patient afebrile and toxic appearing. No distress. Heart tachycardic, regular rhythm. Apneic, lungs CTAB. Abdomen soft, nondistended. Patient is completely obtunded, does not withdraw extremities to noxious stimuli. Pupils are equal, round and minimally reactive to light. Thick white oral secretions noted. EKG  EKG was reviewed by emergency department physician in the absence of a cardiologist    Narrow complex sinus rhythm, rate 100, normal axis, normal TX and QRS intervals, normal Qtc, no ST elevations or depressions, normal t-wave morphology, impression NSR, no STEMI      Screenings            Procedures:    Endotracheal Intubation/Rapid sequence intubation  Date: 11/4/20  Indication: Respiratory failure, inability to protect airway  Consent: emergent    A time-out was completed verifying correct patient, procedure and positioning. The patient was placed in a flat position. Sedation was obtained using Etomidate 20mg and Succinylcholine 120 mg. The patient was easily ventilated using an ambu bag. The Glidescope was used and inserted into the oropharynx at which time there was a Grade 1 view of the vocal cords. A 7.5-Dominican endotracheal tube was inserted and visualized going through the vocal cords.  The stylette was removed. Colorimetric change was visualized on the CO2 meter. Breath sounds were heard in both lung fields equally. The endotracheal tube was secured at 23 cm, measured at the teeth. A chest x-ray was ordered to assess for pneumothorax and verify endotracheal tube placement. Estimated Blood Loss: none    The patient tolerated the procedure well and there were no complications          MDM    Patient arrives in respiratory distress and completely obtunded. Received BVM and additional 4 mg Narcan on arrival to the emergency department without meaningful response. Patient was then emergently intubated for respiratory failure and inability to protect her airway. Hypercapnic likely secondary to apnea, no wheezes to suggest asthma/COPD exacerbation. EKG is no STEMI, troponin normal, ACS or malignant dysrhythmia not evident. CT head without evidence of hemorrhage or mass-effect. CVA is considered however unlikely diagnosis and uncertain when patient last seen well. CTA chest without pulmonary embolism. Does show bibasilar atelectasis versus pneumonia, due to high risk for aspiration did cover with Levaquin. Patient is not septic. EtOH, aspirin and salicylates are negative. UDS negative. Patient's potassium was mildly elevated, however this was drawn after receiving succinylcholine for rapid sequence intubation, a repeat potassium had normalized. No other clinically significant electrolyte normalities. Renal function normal.  Uncertain etiology of apnea and altered mental status, would certainly be consistent with opiate overdose toxidrome however this is unclear. Patient hemodynamically stable on mechanical ventilation. Did begin to arouse and make purposeful movements and required sedation. .  Case discussed with internal medicine team and will admit for further evaluation and care. Critical Care:    I have discussed the case with the advanced practice provider.  I have personally performed a history, physical exam, and my own medical decision making. I have reviewed the note and agree with the findings and plan. Upon my evaluation, this patient had a high probability of imminent or life-threatening deterioration due to acute encephalopathy, acute respiratory failure which required my direct attention, intervention, and personal management. The total critical care time personally spent while evaluating and treating this patient was 34 minutes exclusive of any time spent doing separately billable procedures. This includes time at the bedside, data interpretation, medication management, monitoring for potential decompensation and physician consultation. Specifics of interventions taken and potentially life-threatening diagnostic considerations are listed above in the medical decision making. Patient Referrals:  No follow-up provider specified. Discharge Medications:  New Prescriptions    No medications on file       FINAL IMPRESSION  1. Acute respiratory distress    2. COVID-19    3. Hyperkalemia    4. Acute encephalopathy        Blood pressure 132/78, pulse 75, temperature 98.6 °F (37 °C), temperature source Temporal, weight 136 lb 11 oz (62 kg), last menstrual period 05/17/2014, SpO2 98 %, not currently breastfeeding. For further details of [facility] emergency department encounter, please see documentation by advanced practice provider, JOHN Ruano.     Syed Bocanegra DO (electronically signed)  Attending Emergency Physician       Syed Bocanegra DO  11/04/20 1957 [facility],   [facility]

## 2020-11-05 NOTE — ED NOTES
Hourly rounding on pt., Pt breathing easy without distress, pt appears comfortable, denies any additional needs or concerns at this time. Pt sedated with Propofol.      Rox Huber RN  11/04/20 1935

## 2020-11-05 NOTE — PROGRESS NOTES
Pt. Extubated per RT. Pt tolerated extubation well. Surgery with orders for the bedside. Awaiting a 15 blade and Lidocaine per pharmacy.

## 2020-11-05 NOTE — PROGRESS NOTES
Ex- Boyfriend Sharyon Apley continues to call unit for information regarding patient. No information has been given per mothers request while pt. is Intubated. Pt. Now extubated awake, alert, and oriented, she requests that I remove Sharyon Apley from contact list.  She wants no contact with Sharyon Apley and no information given to him.

## 2020-11-05 NOTE — PROGRESS NOTES
Propofol @ 80 mcg/kg/min, discussed w/ Dr Melvina Loomis. Plan to wean propofol down to 50 mcg/kg/min. Added precedex and fentanyl to help with comfort/sedation. RASS -1, CPOT 0 . Pt in no signs of distress. Vitals cycling. Restraints & safety precautions in place.

## 2020-11-05 NOTE — PROGRESS NOTES
Resting quietly in bed. Complaints hungry wants to eat. Will give drinking trial prior to liquids. Very weak difficult to cough up secretions. O2 saturation 99% room air.

## 2020-11-05 NOTE — CARE COORDINATION
Patient has unstageable wound to right buttocks. Wound debrided today by Dr Jimmy Rao, please see his note. After debridement wound discovered to be a stage 3. Dr Jimmy Rao gave orders to wet to dry dressings, changed daily. No new orders at this time.   Kristina Mark, SPENSERN, RN  [facility]

## 2020-11-05 NOTE — PROGRESS NOTES
Reassessment complete, vitals obtained. Propofol, precedex and fentanyl infusing per MAR. RASS -1. Pt opens eyes to name, nods yes to pain, nods yes to the pain being in her throat, nods no to pain anywhere else. Was noted to move BUE upwards some and thrash head side to side, can be reassured / calmed and returned to resting eyes closed. Restraints remain in place. Pt turned / repositioned. AM labs collected from port.

## 2020-11-05 NOTE — ED NOTES
Nursing report called to [facility], RN on receiving unit. RN accepts patient and has no further questions.      Rox Huber RN  11/04/20 [id number]

## 2020-11-05 NOTE — CONSULTS
P Pulmonary and Critical Care   Consult Note      Reason for Consult: Respiratory failure, altered mental status  Requesting Physician: Dr. Genesis Gordillo  Subjective:   [address] [facility]:                The patient is a 50 y.o. female with significant past medical history of:      Diagnosis Date    Adult failure to thrive     Allergic     Anemia     related to gastric bypass, followed by Dr. Heath Golden CHF [facility]congestive heart failure) [facility]     Chronic neck pain     Copper deficiency     Depression     Hypertension     Sciatica     Seizures ([facility] 3/2014    hypoglycemia and/or benzo withdrawal    Vitamin B12 deficiency neuropathy ([facility]      Patient was brought from the nursing home to the emergency department after being found unresponsive. She received Narcan in the field without benefit. She ultimately required intubation and mechanical ventilation.       Past Surgical History:        Procedure Laterality Date    ABDOMINOPLASTY      CHOLECYSTECTOMY      GASTRIC BYPASS SURGERY      TUNNELED VENOUS PORT PLACEMENT Right 5/2/2014    Dr. Lorraine Collet     Current Medications:    Current Facility-Administered Medications: influenza quadrivalent split vaccine (FLUZONE;FLUARIX;FLULAVAL;AFLURIA) injection 0.5 mL, 0.5 mL, Intramuscular, Prior to discharge  potassium chloride 20 mEq/50 mL IVPB (Central Line), 20 mEq, Intravenous, PRN  lidocaine-EPINEPHrine 1 percent-[id number] injection 20 mL, 20 mL, Intradermal, Once  0.9 % sodium chloride infusion, , Intravenous, Continuous  sodium chloride flush 0.9 % injection 10 mL, 10 mL, Intravenous, 2 times per day  sodium chloride flush 0.9 % injection 10 mL, 10 mL, Intravenous, PRN  acetaminophen (TYLENOL) tablet 650 mg, 650 mg, Oral, Q6H PRN **OR** acetaminophen (TYLENOL) suppository 650 mg, 650 mg, Rectal, Q6H PRN  polyethylene glycol (GLYCOLAX) packet 17 g, 17 g, Oral, Daily PRN  promethazine (PHENERGAN) tablet 12.5 mg, 12.5 mg, Oral, Q6H PRN **OR** ondansetron (ZOFRAN) injection 4 mg, 4 mg, Intravenous, Q6H PRN  enoxaparin (LOVENOX) injection 40 mg, 40 mg, Subcutaneous, Daily  propofol injection, 10 mcg/kg/min, Intravenous, Titrated  dexmedetomidine (PRECEDEX) 400 mcg in sodium chloride 0.9 % 100 mL infusion, 0.2 mcg/kg/hr, Intravenous, Continuous  fentaNYL 10 mcg/mL infusion, 50 mcg/hr, Intravenous, Continuous    Allergies   Allergen Reactions    Acetaminophen Other (See Comments)     Pt states \"I am not supposed to take it because of my liver syndrome\"    Codeine Hives and Itching    Morphine Itching    Penicillins Hives and Swelling     Facial swelling    Morphine And Related Nausea And Vomiting    Ondansetron Hcl Itching and Rash       Social History:    TOBACCO:   reports that she has quit smoking. Her smoking use included cigarettes. She smoked 1.00 pack per day. She has never used smokeless tobacco.  ETOH:   reports no history of alcohol use. Patient currently lives independently  Environmental/chemical exposure: None known    Family History:       Problem Relation Age of Onset    Dementia Mother     High Blood Pressure Mother     Thyroid Cancer Mother     Heart Disease Father     Stroke Father     Heart Attack Father     Thyroid Cancer Maternal Grandmother     High Blood Pressure Maternal Grandmother     Heart Disease Maternal Grandmother     Lung Cancer Paternal Grandfather      REVIEW OF SYSTEMS:    CONSTITUTIONAL:  negative for fevers, chills, diaphoresis, activity change, appetite change, fatigue, night sweats and unexpected weight change.    EYES:  negative for blurred vision, eye discharge, visual disturbance and icterus  HEENT:  negative for hearing loss, tinnitus, ear drainage, sinus pressure, nasal congestion, epistaxis and snoring  RESPIRATORY:  See HPI  CARDIOVASCULAR:  negative for chest pain, palpitations, exertional chest pressure/discomfort, edema, syncope  GASTROINTESTINAL:  negative for nausea, vomiting, diarrhea, constipation, blood in stool and abdominal pain  GENITOURINARY:  negative for frequency, dysuria, urinary incontinence, decreased urine volume, and hematuria  HEMATOLOGIC/LYMPHATIC:  negative for easy bruising, bleeding and lymphadenopathy  ALLERGIC/IMMUNOLOGIC:  negative for recurrent infections, angioedema, anaphylaxis and drug reactions  ENDOCRINE:  negative for weight changes and diabetic symptoms including polyuria, polydipsia and polyphagia  MUSCULOSKELETAL:  negative for  pain, joint swelling, decreased range of motion and muscle weakness  NEUROLOGICAL:  negative for headaches, slurred speech, unilateral weakness  PSYCHIATRIC/BEHAVIORAL: negative for hallucinations, behavioral problems, confusion and agitation. Objective:   PHYSICAL EXAM:      VITALS:  /79   Pulse 74   Temp 98 °F (36.7 °C) (Temporal)   Resp 17   Ht 5' 7\" (1.702 m)   Wt 149 lb 12.8 oz (67.9 kg)   LMP 05/17/2014   SpO2 100%   BMI 23.46 kg/m²      24HR INTAKE/OUTPUT:      Intake/Output Summary (Last 24 hours) at 11/5/2020 1341  Last data filed at 11/5/2020 1203  Gross per 24 hour   Intake 1513.21 ml   Output 2625 ml   Net -1111.79 ml     CONSTITUTIONAL:  awake, alert, cooperative, no apparent distress, and appears stated age  NECK:  Supple, symmetrical, trachea midline, no adenopathy, thyroid symmetric, not enlarged and no tenderness, skin normal  LUNGS:  no increased work of breathing and clear to auscultation. No accessory muscle use  CARDIOVASCULAR: S1 and S2, no edema and no JVD  ABDOMEN:  normal bowel sounds, non-distended and no masses palpated, and no tenderness to palpation. No hepatospleenomegaly  LYMPHADENOPATHY:  no axillary or supraclavicular adenopathy. No cervical adnenopathy  PSYCHIATRIC: Oriented to person place and time. No obvious depression or anxiety. MUSCULOSKELETAL: No obvious misalignment or effusion of the joints. No clubbing, cyanosis of the digits.   SKIN:  normal skin color, texture, turgor and no redness, warmth, or swelling. No palpable nodules    DATA:    Old records have been reviewed    CBC:  Recent Labs     11/04/20  0925 11/05/20  0430   WBC 4.3 3.4*   RBC 3.54* 3.32*   HGB 11.0* 10.4*   HCT 35.9* 32.3*    159   .4* 97.3   MCH 30.9 31.3   MCHC 30.5* 32.2   RDW 14.4 14.1      BMP:  Recent Labs     11/04/20  0925 11/04/20  1205 11/05/20  0430     --  146*   K 5.9* 3.9 3.3*   CL 97*  --  104   CO2 40*  --  34*   BUN 16  --  10   CREATININE <0.5*  --  <0.5*   CALCIUM 8.4  --  8.0*   GLUCOSE 119*  --  91      ABG:  Recent Labs     11/04/20  0955 11/05/20  0830   PHART 7.376 7.593*   MVG6JNF 70.0* 35.1   PO2ART 97.7 83.4   VWA3LUB 41.4* 33.9*   H1LXOSNU 98.5 98.1   BEART 13.7* 11.5*       No results found for: BNP  Lab Results   Component Value Date    CKTOTAL 25 (L) 11/04/2020    TROPONINI <0.01 11/04/2020       Cultures:     Abx:    Radiology Review:  All pertinent images / reports were reviewed as a part of this visit. Assessment:     1. Acute respiratory failure  2. Altered mental status  3. COVID-19 infection    I have reviewed laboratories, medical records and images for this visit  I reviewed CT imaging  She does have some basilar atelectasis which could be chronic. Does not have the typical changes of COVID-19 pneumonia  She is afebrile  Procalcitonin is normal  She does not have a leukocytosis  She is SARS-CoV-2 positive from a swab done here. There was history that she was positive at the nursing home as well but we did not have documentation of that. Hard to know how long she has been positive. Stop her sedation and give her a trial liberation from mechanical ventilation  At this time she is not on Decadron, remdesivir or convalescent plasma  We will reassess after she is liberated from mechanical ventilation.   I do not think she needs antibiotics  Her drug screen was negative    Total critical care time caring for this patient with life threatening illness, including direct patient contact, management of life support systems, review of data including imaging and labs, discussions with other team members and physicians is at least 32 minutes so far today, excluding procedures.

## 2020-11-05 NOTE — PROGRESS NOTES
Nimo Zarate  AGE: 50 y.o. GENDER: female  : 1975  TODAY'S DATE:  2020    Chief Complaint   Patient presents with    Loss of Consciousness     PT arrived via EMS unresponsive. HISTORY of PRESENT ILLNESS HPI     Nimo Zarate is a 50 y.o. female who presents today for wound evaluation. History of Wound: Pressure ulceration right buttock  Wound Pain:  mild  Severity:  3 / 10   Wound Type:  pressure  Modifying Factors:  none  Associated Signs/Symptoms:  none    Procedure Note    Performed by: Tea Majano MD    Consent obtained: Yes    Time out taken:  Yes    Pain Control:       Debridement:Excisional Debridement    Using #15 blade scalpel and forceps the wound was sharply debrided    down through and including the removal of subcutaneous tissue. Devitalized Tissue Debrided:  necrotic/eschar    Pre Debridement Measurements:  Are located in the Wound Documentation Flow Sheet    Wound #: 1     Post  Debridement Measurements:  Wound 20 Coccyx Mid (Active)   Wound Etiology Pressure Unstageable 20   Dressing Status New dressing applied;Clean;Dry; Intact 20   Wound Cleansed Wound cleanser 20   Dressing/Treatment Moist to dry;ABD; Foam 20   Dressing Change Due 20   Wound Length (cm) 8 cm 20   Wound Width (cm) 9.2 cm 20   Wound Surface Area (cm^2) 73.6 cm^2 20   Wound Assessment Eschar dry;Pink/red;Slough 20   Number of days: 0     Predebridement measurement- intact  Post debridement measurement-8 x 6.5 x 2.5 cm      Total Surface Area Debrided: 52 sq cm     Bleeding:  Minimal    Hemostasis Achieved:  by pressure    Procedural Pain:  3  / 10     Post Procedural Pain:  0 / 10     Response to treatment:  Well tolerated by patient. The nature of the patient's condition was explained in depth.  The patient was informed that their compliance to the treatment plan is paramount to successful healing and prevention of further ulceration and/or infection     Treatment Plan:   47-year-old female with an unstageable pressure ulceration of the right buttock. At bedside, the ulcer was debrided after injecting 1% lidocaine. The ulcer is a deep stage III pressure ulceration. The post debridement measurements are 8 x 6.5 x 2.5 cm. Orders were written for wet-to-dry dressing changes daily. No need for antibiotics with regards to the ulceration.      Diana Petit M.D.  11/5/2020

## 2020-11-05 NOTE — PROGRESS NOTES
Hospitalist Progress Note      PCP: Referring Not In System (Inactive)    Date of Admission: 11/4/2020    Chief Complaint:  [facility] Course:   Mahin black 50 y.o. female presents to the emergency department today for evaluation for altered mental status and unresponsiveness.  The patient is from [facility], and the only history that we are able to obtain is that she is there for drug rehab.  The patient does have a history of adult failure to thrive, CHF, hypertension.  Unsure of the last seen normal, per EMS report, nursing home staff went in to see the patient today, and they noticed that she was unresponsive. Ivana Menezes is a full code.  Apparently has been tested positive for COVID-19. We do not have this test result in the chart and we were not able to get a hold of Dawn to confirm when this actually was done No other history is able to be obtained at this time. The patient is intubated and sedated at the time of my evaluation.       Subjective:   She was alert on the vent this morning and has tolerated liberation from the vent. Resting comfortably at this time.         Medications:  Reviewed    Infusion Medications    sodium chloride 125 mL/hr at 11/05/20 0641     Scheduled Medications    influenza virus vaccine  0.5 mL Intramuscular Prior to discharge    sodium chloride flush  10 mL Intravenous 2 times per day    enoxaparin  40 mg Subcutaneous Daily     PRN Meds: potassium chloride, sodium chloride flush, acetaminophen **OR** acetaminophen, polyethylene glycol, promethazine **OR** ondansetron      Intake/Output Summary (Last 24 hours) at 11/5/2020 1552  Last data filed at 11/5/2020 1521  Gross per 24 hour   Intake 2414.39 ml   Output 3525 ml   Net -1110.61 ml       Physical Exam Performed:    /74   Pulse 68   Temp 98 °F (36.7 °C) (Temporal)   Resp 16   Ht 5' 7\" (1.702 m)   Wt 149 lb 12.8 oz (67.9 kg)   LMP 05/17/2014   SpO2 91%   BMI 23.46 kg/m² General appearance: No apparent distress, appears stated age and cooperative. HEENT: Pupils equal, round, and reactive to light. Conjunctivae/corneas clear. Neck: Supple, with full range of motion. No jugular venous distention. Trachea midline. Respiratory:  Normal respiratory effort. Clear to auscultation, bilaterally without Rales/Wheezes/Rhonchi. Cardiovascular: Regular rate and rhythm with normal S1/S2 without murmurs, rubs or gallops. Abdomen: Soft, non-tender, non-distended with normal bowel sounds. Musculoskeletal: No clubbing, cyanosis or edema bilaterally. Full range of motion without deformity. Skin: Skin color, texture, turgor normal.  No rashes or lesions. Neurologic:  Neurovascularly intact without any focal sensory/motor deficits. Cranial nerves: II-XII intact, grossly non-focal.  Psychiatric: Alert and oriented, thought content appropriate, normal insight  Capillary Refill: Brisk,< 3 seconds   Peripheral Pulses: +2 palpable, equal bilaterally       Labs:   Recent Labs     11/04/20 0925 11/05/20  0430   WBC 4.3 3.4*   HGB 11.0* 10.4*   HCT 35.9* 32.3*    159     Recent Labs     11/04/20  0925 11/04/20  1205 11/05/20  0430     --  146*   K 5.9* 3.9 3.3*   CL 97*  --  104   CO2 40*  --  34*   BUN 16  --  10   CREATININE <0.5*  --  <0.5*   CALCIUM 8.4  --  8.0*     Recent Labs     11/04/20  0925   AST 74*   ALT 54*   BILITOT 1.0   ALKPHOS 161*     Recent Labs     11/04/20  0925   INR 1.60*     Recent Labs     11/04/20  0925   CKTOTAL 25*   TROPONINI <0.01       Urinalysis:      Lab Results   Component Value Date    NITRU Negative 11/04/2020    WBCUA 3 11/04/2020    BACTERIA 3+ 09/18/2020    RBCUA 7 11/04/2020    BLOODU Negative 11/04/2020    SPECGRAV 1.023 11/04/2020    GLUCOSEU Negative 11/04/2020       Radiology:  CT CHEST PULMONARY EMBOLISM W CONTRAST   Final Result   1. Negative for pulmonary embolus. 2. Bibasilar atelectasis versus pneumonia.          CT HEAD WO CONTRAST Final Result   No acute intracranial abnormality. Stable compared to prior study         XR CHEST PORTABLE   Final Result   New small to moderate size right pleural effusion. New right lower lobe airspace disease due to atelectasis versus pneumonia at   the diaphragm. Unchanged atelectasis in the left lung base. Assessment/Plan:    Active Hospital Problems    Diagnosis    Acute respiratory distress [R06.03]    Unresponsive [R41.89]     Unresponsive/obtunded  -No clear cause at this point as the patient was at [facility] for drug rehab we suspect that this is probably an accidental overdose. However her tox screen is negative at this time. In the emergency department she received 3 doses of Narcan with no response. She is better today. Perhaps due to sepsis ? Cultures are pending.     Acute hypercarbic respiratory failure  -Intubated due to obtunded status and inability to protect airway  -No evidence of hypoxia that I am able to see from her vitals.  -Her initial PCO2 on blood gas however was in the high 90s at 97  -extubated today and doing well    Sacral decubitus ulcer  - POA  - stage 3-4  - general surgery consulted for debridement.      Hyperkalemia  -Cause is unclear as her renal function is normal  -She does not appear to be taking any potassium supplements  -We will likely improve with IV fluids  -We will monitor  -resolved      Obtunded  -No clear cause at this time initial suspicion was for possible overdose  -Urine tox is negative for drugs of abuse however other drugs are still a possibility salicylate and Tylenol levels are also within normal limits  -CT of the head is normal     COVID-19  -She is supposedly COVID-19 positive we will have to reswab her as we do not have a result from the nursing facility and so far have been unsuccessful in getting a hold of them.   Currently her imaging not appear bad she is not on any steroids she will not be getting plasma or remdesivir at this time     DVT Prophylaxis: lovenox   Diet: Diet NPO Effective Now  Code Status: Full Code    PT/OT Eval Status: eval and treat     Dispo - continue current care  wll monitor on ICU, she is COVID-19 positive per reports. 33 minutes of critical care time spent.      Duke Jama MD

## 2020-11-06 NOTE — DISCHARGE SUMMARY
Hospital Medicine Discharge Summary    Patient: Andrea Galvez     Gender: female  : 1975   Age: 50 y.o. MRN: [id number]    Admitting Physician: Americo Vazquez MD  Discharge Physician: Americo Vazquez MD     Code Status: Full Code     Admit Date: 2020   Discharge Date:   2020    Disposition:  snf    Discharge Diagnoses: Active Hospital Problems    Diagnosis Date Noted    Acute respiratory distress [R06.03]    Eddie Crowe [R41.89] 2020       Follow-up appointments:  two weeks    Outpatient to do list: follow up    Condition at Discharge:  [address] Course:   Mallory Height a 50 y.o. female presents to the emergency department today for evaluation for altered mental status and unresponsiveness.  The patient is from [facility], and the only history that we are able to obtain is that she is there for drug rehab.  The patient does have a history of adult failure to thrive, CHF, hypertension.  Unsure of the last seen normal, per EMS report, nursing home staff went in to see the patient today, and they noticed that she was unresponsive. Marquez Vora is a full code.  Apparently has been tested positive for COVID-19.  We do not have this test result in the chart and we were not able to get a hold of Dawn to confirm when this actually was done No other history is able to be obtained at this time.  The patient is intubated and sedated at the time of my evaluation.     Unresponsive/obtunded  -No clear cause at this point as the patient was at [facility] for drug rehab we suspect that this is probably an accidental overdose. Karel Mueller her tox screen is negative at this time. In the emergency department she received 3 doses of Narcan with no response. She is better today. Perhaps due to sepsis ?   Cultures are  negative     Acute hypercarbic respiratory failure  -Intubated due to obtunded status and inability to protect airway  -No evidence of hypoxia that I am able to see from her vitals.  -Her initial PCO2 on blood gas however was in the high 90s at 97  -extubated today and doing well, but is disabled.      Sacral decubitus ulcer  - POA  - stage 3-4  - general surgery consulted for debridement.   -no need for abx     Hyperkalemia  -Cause is unclear as her renal function is normal  -She does not appear to be taking any potassium supplements  -We will likely improve with IV fluids  -We will monitor  -resolved      Obtunded  -No clear cause at this time initial suspicion was for possible overdose  -Urine tox is negative for drugs of abuse however other drugs are still a possibility salicylate and Tylenol levels are also within normal limits  -CT of the head is normal  -resolved and there was no clear cause      COVID-19  -She is supposedly COVID-19 positive we will have to reswab her as we do not have a result from the nursing facility and so far have been unsuccessful in getting a hold of them. Currently her imaging not appear bad she is not on any steroids she will not be getting plasma or remdesivir at this time     Doing well and stable to return to [facility]  Discharge Medications:   Current Discharge Medication List        Current Discharge Medication List        Current Discharge Medication List      CONTINUE these medications which have NOT CHANGED    Details   Melatonin 10 MG TABS Take 10 mg by mouth nightly as needed      apixaban (ELIQUIS DVT/PE STARTER PACK) 5 MG TABS tablet Take 10 mg (2 tablets) orally twice daily for 7 days, then take 5 mg (1 tablet) orally twice daily thereafter.   Qty: 74 tablet, Refills: 2      QUEtiapine (SEROQUEL) 50 MG tablet Take 0.5 tablets by mouth nightly  Qty: 60 tablet, Refills: 3      folic acid (FOLVITE) 1 MG tablet Take 1 tablet by mouth daily  Qty: 30 tablet, Refills: 3      vitamin B-12 (CYANOCOBALAMIN) 500 MCG tablet Take 500 mcg by mouth daily      promethazine (PHENERGAN) 25 MG tablet Take 25 mg by mouth every 12 hours loperamide (IMODIUM) 2 MG capsule Take 2 mg by mouth every 12 hours      ibuprofen (ADVIL;MOTRIN) 800 MG tablet Take 800 mg by mouth every 12 hours as needed for Pain       pantoprazole (PROTONIX) 40 MG tablet Take 40 mg by mouth daily      busPIRone (BUSPAR) 5 MG tablet Take 10 mg by mouth 2 times daily       Calcium Carb-Cholecalciferol (CALCIUM 600-D PO) Take 1 tablet by mouth 2 times daily       Multiple Vitamins-Minerals (THERAPEUTIC MULTIVITAMIN-MINERALS) tablet Take 1 tablet by mouth daily      Ascorbic Acid (VITAMIN C) 500 MG CAPS Take 500 mg by mouth daily  Qty: 30 capsule, Refills: 3           Current Discharge Medication List      STOP taking these medications       buprenorphine-naloxone (SUBOXONE) 8-2 MG FILM SL film Comments:   Reason for Stopping:         cloNIDine (CATAPRES) 0.1 MG tablet Comments:   Reason for Stopping:               Discharge ROS:  A complete review of systems was asked and negative except for weakness     Discharge Exam:    /85   Pulse 102   Temp 98.1 °F (36.7 °C) (Temporal)   Resp 17   Ht 5' 7\" (1.702 m)   Wt 158 lb 11.7 oz (72 kg)   LMP 05/17/2014   SpO2 92%   BMI 24.86 kg/m²   General appearance:  NAD  HEENT:   Normal cephalic, atraumatic, moist mucous membranes, no oropharyngeal erythema or exudate  Neck: Supple, trachea midline, no anterior cervical or SC LAD  Heart[de-identified] Normal s1/s2, RRR, no murmurs, gallops, or rubs. no  leg edema  Lungs:  . Clear to auscultation, bilaterally without Rales/Wheezes/Rhonchi. Abdomen: Soft, non-tender, non-distended, bowel sounds present, no masses  Musculoskeletal:  No clubbing, no cyanosis, no edema  Skin: No lesion or masses  Neurologic:  Neurovascularly intact without any focal sensory/motor deficits. Cranial nerves: II-XII intact, grossly non-focal.  Psychiatric:  A & O x3  Neuro: Grossly intact, moves all four extremities     Labs:  For convenience and continuity at follow-up the following most recent labs are provided:    Lab Results   Component Value Date    WBC 3.9 11/06/2020    HGB 9.5 11/06/2020    HCT 30.8 11/06/2020    MCV 99.9 11/06/2020     11/06/2020     11/06/2020    K 3.8 11/06/2020     11/06/2020    CO2 34 11/06/2020    BUN 9 11/06/2020    CREATININE <0.5 11/06/2020    CALCIUM 7.6 11/06/2020    ALKPHOS 161 11/04/2020    ALT 54 11/04/2020    AST 74 11/04/2020    BILITOT 1.0 11/04/2020    LABALBU 2.6 11/04/2020     Lab Results   Component Value Date    INR 1.60 (H) 11/04/2020    INR 0.97 09/18/2020       Radiology:  Ct Head Wo Contrast    Result Date: 11/4/2020  EXAMINATION: CT OF THE HEAD WITHOUT CONTRAST  11/4/2020 9:57 am TECHNIQUE: CT of the head was performed without the administration of intravenous contrast. Dose modulation, iterative reconstruction, and/or weight based adjustment of the mA/kV was utilized to reduce the radiation dose to as low as reasonably achievable. COMPARISON: September 18, 2020 HISTORY: ORDERING SYSTEM PROVIDED HISTORY: altered TECHNOLOGIST PROVIDED HISTORY: Reason for exam:->altered Has a \"code stroke\" or \"stroke alert\" been called? ->No Is the patient pregnant?->No Reason for Exam: altered , vented pt Acuity: Acute Type of Exam: Initial FINDINGS: BRAIN/VENTRICLES: There is no acute intracranial hemorrhage, mass effect or midline shift. No abnormal extra-axial fluid collection. The gray-white differentiation is maintained without evidence of an acute infarct. There is no evidence of hydrocephalus. ORBITS: The visualized portion of the orbits demonstrate no acute abnormality. SINUSES: The visualized paranasal sinuses and mastoid air cells demonstrate no acute abnormality. SOFT TISSUES/SKULL:  No acute abnormality of the visualized skull or soft tissues. No acute intracranial abnormality.  Stable compared to prior study     Xr Chest Portable    Result Date: 11/4/2020  EXAMINATION: ONE XRAY VIEW OF THE CHEST 11/4/2020 9:39 am COMPARISON: September 18, 2020 HISTORY: ORDERING SYSTEM PROVIDED HISTORY: SOB TECHNOLOGIST PROVIDED HISTORY: Reason for exam:->SOB Reason for Exam: Loss of Consciousness (PT arrived via EMS unresponsive. ) Acuity: Acute Type of Exam: Initial FINDINGS: Bilateral lower lobe airspace opacities at the diaphragm are present. Blunting of the right costophrenic angle is present, new. No evidence of pneumothorax. Endotracheal tube terminates 2 cm above the trixie. New small to moderate size right pleural effusion. New right lower lobe airspace disease due to atelectasis versus pneumonia at the diaphragm. Unchanged atelectasis in the left lung base. Ct Chest Pulmonary Embolism W Contrast    Result Date: 11/4/2020  EXAMINATION: CTA OF THE CHEST 11/4/2020 11:59 am TECHNIQUE: CTA of the chest was performed after the administration of intravenous contrast.  Multiplanar reformatted images are provided for review. MIP images are provided for review. Dose modulation, iterative reconstruction, and/or weight based adjustment of the mA/kV was utilized to reduce the radiation dose to as low as reasonably achievable. COMPARISON: 09/18/2020 HISTORY: ORDERING SYSTEM PROVIDED HISTORY: r/o pe TECHNOLOGIST PROVIDED HISTORY: Reason for exam:->r/o pe FINDINGS: Pulmonary Arteries: Pulmonary arteries are adequately opacified for evaluation. No evidence of intraluminal filling defect to suggest pulmonary embolism. Main pulmonary artery is normal in caliber. Mediastinum: No evidence of mediastinal lymphadenopathy. The heart and pericardium demonstrate no acute abnormality. There is no acute abnormality of the thoracic aorta. Lungs/pleura: There Is segmental consolidation within the bilateral lung bases. There are trace bilateral pleural effusions. Upper Abdomen: Limited images of the upper abdomen are unremarkable. Soft Tissues/Bones: No acute bone or soft tissue abnormality. 1. Negative for pulmonary embolus. 2. Bibasilar atelectasis versus pneumonia. The patient was seen and examined on day of discharge and this discharge summary is in conjunction with any daily progress note from day of discharge. Time Spent on discharge is 30 minutes  in the examination, evaluation, counseling and review of medications and discharge plan. Note that greater  than 30 minutes was spent in preparing discharge papers, discussing discharge with patient, medication review, etc.       Signed:    ePdro Almeida MD   11/6/2020      Thank you Referring Not In System (Inactive) for the opportunity to be involved in this patient's care.  If you have any questions or concerns please feel free to contact me at [phone]

## 2020-11-06 NOTE — PROGRESS NOTES
[facility] Pulmonary and Critical Care  Progress note      Reason for Consult: Respiratory failure, altered mental status  Requesting Physician: Dr. Rianna Mcallister  Subjective:   [facility]:                The patient is a 50 y.o. female with significant past medical history of:      Diagnosis Date    Adult failure to thrive     Allergic     Anemia     related to gastric bypass, followed by Dr. Сергей Stokes [facility]     Chronic neck pain     Copper deficiency     Depression     Hypertension     Sciatica     Seizures ([facility] 3/2014    hypoglycemia and/or benzo withdrawal    Vitamin B12 deficiency neuropathy (HCC)      Interval history: Patient has been liberated from mechanical ventilation. Tolerating 2 L/min nasal cannula oxygen supplement.       Past Surgical History:        Procedure Laterality Date    ABDOMINOPLASTY      CHOLECYSTECTOMY      GASTRIC BYPASS SURGERY      TUNNELED VENOUS PORT PLACEMENT Right 5/2/2014    Dr. Leydi Marion     Current Medications:    Current Facility-Administered Medications: influenza quadrivalent split vaccine (FLUZONE;FLUARIX;FLULAVAL;AFLURIA) injection 0.5 mL, 0.5 mL, Intramuscular, Prior to discharge  potassium chloride 20 mEq/50 mL IVPB (Central Line), 20 mEq, Intravenous, PRN  0.9 % sodium chloride infusion, , Intravenous, Continuous  sodium chloride flush 0.9 % injection 10 mL, 10 mL, Intravenous, 2 times per day  sodium chloride flush 0.9 % injection 10 mL, 10 mL, Intravenous, PRN  acetaminophen (TYLENOL) tablet 650 mg, 650 mg, Oral, Q6H PRN **OR** acetaminophen (TYLENOL) suppository 650 mg, 650 mg, Rectal, Q6H PRN  polyethylene glycol (GLYCOLAX) packet 17 g, 17 g, Oral, Daily PRN  promethazine (PHENERGAN) tablet 12.5 mg, 12.5 mg, Oral, Q6H PRN **OR** ondansetron (ZOFRAN) injection 4 mg, 4 mg, Intravenous, Q6H PRN  enoxaparin (LOVENOX) injection 40 mg, 40 mg, Subcutaneous, Daily    Allergies   Allergen Reactions    Acetaminophen Other (See Comments)     Pt states \"I am not supposed to take it because of my liver syndrome\"    Codeine Hives and Itching    Morphine Itching    Penicillins Hives and Swelling     Facial swelling    Morphine And Related Nausea And Vomiting    Ondansetron Hcl Itching and Rash       Social History:    TOBACCO:   reports that she has quit smoking. Her smoking use included cigarettes. She smoked 1.00 pack per day. She has never used smokeless tobacco.  ETOH:   reports no history of alcohol use. Patient currently lives independently  Environmental/chemical exposure: None known    Family History:       Problem Relation Age of Onset    Dementia Mother     High Blood Pressure Mother     Thyroid Cancer Mother     Heart Disease Father     Stroke Father     Heart Attack Father     Thyroid Cancer Maternal Grandmother     High Blood Pressure Maternal Grandmother     Heart Disease Maternal Grandmother     Lung Cancer Paternal Grandfather      REVIEW OF SYSTEMS:    CONSTITUTIONAL:  negative for fevers, chills, diaphoresis, activity change, appetite change, fatigue, night sweats and unexpected weight change.    EYES:  negative for blurred vision, eye discharge, visual disturbance and icterus  HEENT:  negative for hearing loss, tinnitus, ear drainage, sinus pressure, nasal congestion, epistaxis and snoring  RESPIRATORY:  See HPI  CARDIOVASCULAR:  negative for chest pain, palpitations, exertional chest pressure/discomfort, edema, syncope  GASTROINTESTINAL:  negative for nausea, vomiting, diarrhea, constipation, blood in stool and abdominal pain  GENITOURINARY:  negative for frequency, dysuria, urinary incontinence, decreased urine volume, and hematuria  HEMATOLOGIC/LYMPHATIC:  negative for easy bruising, bleeding and lymphadenopathy  ALLERGIC/IMMUNOLOGIC:  negative for recurrent infections, angioedema, anaphylaxis and drug reactions  ENDOCRINE:  negative for weight changes and diabetic symptoms deferred  Abdomen/GI: deferred  Neuro: deferred  Skin: deferred  Musculoskeletal:  deferred  Genitourinary: Deferred  Psych: deferred  Lymphatic/Immunologic: deferred      DATA:    Old records have been reviewed    CBC:  Recent Labs     11/04/20  0925 11/05/20  0430 11/06/20  0400   WBC 4.3 3.4* 3.9*   RBC 3.54* 3.32* 3.08*   HGB 11.0* 10.4* 9.5*   HCT 35.9* 32.3* 30.8*    159 147   .4* 97.3 99.9   MCH 30.9 31.3 30.9   MCHC 30.5* 32.2 30.9*   RDW 14.4 14.1 14.6      BMP:  Recent Labs     11/04/20  0925 11/04/20  1205 11/05/20  0430 11/06/20  0400     --  146* 146*   K 5.9* 3.9 3.3* 3.8   CL 97*  --  104 106   CO2 40*  --  34* 34*   BUN 16  --  10 9   CREATININE <0.5*  --  <0.5* <0.5*   CALCIUM 8.4  --  8.0* 7.6*   GLUCOSE 119*  --  91 69*      ABG:  Recent Labs     11/04/20  0955 11/05/20  0830   PHART 7.376 7.593*   [id number] 70.0* 35.1   PO2ART 97.7 83.4   [id number] 41.4* 33.9*   [id number] 98.5 98.1   BEART 13.7* 11.5*       No results found for: BNP  Lab Results   Component Value Date    CKTOTAL 25 (L) 11/04/2020    TROPONINI <0.01 11/04/2020       Cultures:     Abx:    Radiology Review:  All pertinent images / reports were reviewed as a part of this visit. Assessment:     1. Acute respiratory failure  2. Altered mental status  3. COVID-19 infection    I have reviewed laboratories, medical records and images for this visit  Tolerate liberation from mechanical ventilation  Tolerating 2 L/min nasal cannula oxygen supplement  Has had a little bit of difficulty with swallowing. Have speech evaluate her  She is SARS-CoV-2 positive though does not have typical changes of SARS-CoV-2 pneumonia.   I will sign off

## 2020-11-06 NOTE — CARE COORDINATION
CM spoke to Terry/ amberly at [facility] to advise of need to initiate a pre-cert for patient return. Therapy messaged to complete assessment.      SPENSER IsaacN, CCM, RN  [facility]  [phone]

## 2020-11-06 NOTE — CARE COORDINATION
Discharge Plan:     Patient discharged to: [facility]: [facility]  Phone: [phone]  Fax: [phone]  SW/DC Planner faxed, [phone]  Narcotic Prescriptions faxed were: none  RN: Yair Saavedra) will call report to:       Medical Transport with: Rico Cosme secured transport through [facility] up time:1900  Family advised of discharge?:yes  HENS Submitted?:  Returns to LTC facility, not needed  All discharge needs met per case management.         Agus Pina, BSN, CCM, RN  [facility]  [phone]

## 2020-11-06 NOTE — PROGRESS NOTES
Occupational Therapy   Occupational Therapy Initial Assessment  Date: 2020   Patient Name: Codi Tolentino  MRN: [id number]     : 1975    Date of Service: 2020    Discharge Recommendations: Codi Tolentino scored a 8/24 on the AM-PAC ADL Inpatient form. Current research shows that an AM-PAC score of 17 or less is typically not associated with a discharge to the patient's home setting. Based on the patient's AM-PAC score and their current ADL deficits, it is recommended that the patient have 3-5 sessions per week of Occupational Therapy at d/c to increase the patient's independence. Please see assessment section for further patient specific details. If patient discharges prior to next session this note will serve as a discharge summary. Please see below for the latest assessment towards goals. OT Equipment Recommendations  Equipment Needed: No  Other: Pt has all needed equipment at facility    Assessment   Performance deficits / Impairments: Decreased functional mobility ; Decreased coordination;Decreased posture;Decreased ADL status; Decreased balance;Decreased safe awareness;Decreased cognition  Assessment: 50 y.o. female presents w/ the above deficits which are impacting her occupational performance. Pt would benefit from continued therapy during inpatient stay in order to maximize safety and independence upon discharge. Treatment Diagnosis: Multiple performance deficits associated w/ long standing deficits  Prognosis: Poor  Decision Making: High Complexity  OT Education: OT Role;Plan of Care;Equipment;Transfer Training  Patient Education: Pt will require reinforcement of education provided  Barriers to Learning: Cognition  REQUIRES OT FOLLOW UP: Yes  Activity Tolerance  Activity Tolerance: Patient Tolerated treatment well  Safety Devices  Safety Devices in place: Yes  Type of devices: Left in bed; All fall risk precautions in place;Nurse notified; Patient at risk for falls; Bed alarm in place Patient Diagnosis(es): The primary encounter diagnosis was Acute respiratory distress. Diagnoses of COVID-19, Hyperkalemia, and Acute encephalopathy were also pertinent to this visit. has a past medical history of Adult failure to thrive, Allergic, Anemia, Anxiety, CHF (congestive heart failure) ([address] Chronic neck pain, Copper deficiency, Depression, Hypertension, Sciatica, Seizures ([address] and Vitamin B12 deficiency neuropathy ([address] has a past surgical history that includes Cholecystectomy; Gastric bypass surgery; Abdominoplasty; and Tunneled venous port placement (Right, 5/2/2014). Treatment Diagnosis: Multiple performance deficits associated w/ long standing deficits      Restrictions  Restrictions/Precautions  Restrictions/Precautions: Fall Risk  Required Braces or Orthoses?: No  Position Activity Restriction  Other position/activity restrictions: Alfonso Huertas is a 50 y.o. female presents to the emergency department today for evaluation for altered mental status and unresponsiveness. The patient is from [facility], and the only history that we are able to obtain is that she is there for drug rehab. The patient does have a history of adult failure to thrive, CHF, hypertension. Unsure of the last seen normal, per EMS report, nursing home staff went in to see the patient today, and they noticed that she was unresponsive. Patient is a full code. Apparently has been tested positive for COVID-19.     Subjective   General  Chart Reviewed: Yes  Patient assessed for rehabilitation services?: Yes  Family / Caregiver Present: No  Diagnosis: Unresponsive  Subjective  Subjective: Pt supine in bed upon entry, agreeable to OT/PT eval  Patient Currently in Pain: No  Vital Signs  Resp: 18  Patient Currently in Pain: No  Oxygen Therapy  SpO2: 91 %  Pulse Oximeter Device Mode: Continuous  Pulse Oximeter Device Location: Finger  O2 Device: None (Room air)  Social/Functional History  Social/Functional History  Lives With: Alone  Type of Home: Facility([place])  Home Layout: One level  Home Access: Ramped entrance  Bathroom Shower/Tub: Shower chair with back  Bathroom Toilet: Standard  Bathroom Equipment: Shower chair(uses brief instead of toilet)  Bathroom Accessibility: Wheelchair accessible, Walker accessible  Home Equipment: [employer]  ADL Assistance: Needs assistance(TotalA for bathing and dressing, intermittent assist for feeding based on type of food)  Homemaking Assistance: Needs assistance(completed by facility)  Homemaking Responsibilities: No  Ambulation Assistance: Needs assistance(w/c bound, has not ambulated in 9 months)  Transfer Assistance: Needs assistance(needs help transferring to w/c)  Active : No  Additional Comments: Pt is currently living at a facility, pt reports that she was working with therapy ~1 month ago and was working on standing and fine motor skills. Pt reports that she would like to return to working w/ therapy if possible. Objective   Observation/Palpation  Posture: Poor  Observation: Decreased muscle tone BUE/BLE  Body Mechanics: unable to accomplish tenodesis   Edema: Significant pitting edema BLE     ADL  Toileting: Dependent/Total(Graf in place)  Tone RUE  RUE Tone: Hypotonic  Tone LUE  LUE Tone: Hypotonic  Coordination  Movements Are Fluid And Coordinated: No  Coordination and Movement description: Gross motor impairments; Fine motor impairments;Decreased accuracy; Right UE;Left UE;Decreased speed  Quality of Movement Other  Comment: Pt unable to grasp objects effectively w/ BUE. Provided pt w/ soft touch call light for increased independence. Attempted to use built up utensils w/o success     Bed mobility  Rolling to Left: Dependent/Total  Rolling to Right: Dependent/Total        Cognition  Overall Cognitive Status: Exceptions  Arousal/Alertness: Appropriate responses to stimuli  Following Commands:  Follows one step commands consistently; Follows one step commands with increased time  Attention Span: Attends with cues to redirect  Problem Solving: Decreased awareness of errors  Insights: Fully aware of deficits  Initiation: Requires cues for some  Sequencing: Requires cues for some        Sensation  Overall Sensation Status: Impaired(no sensation BLE)       Plan   Plan  Times per week: 1-2 trial  Times per day: Daily  Current Treatment Recommendations: Strengthening, Patient/Caregiver Education & Training, Home Management Training, Balance Training, Endurance Training, Safety Education & Training, Self-Care / ADL    AM-PAC Score        AM-[facility] Inpatient Daily Activity Raw Score: 8 (11/06/20 1346)  AM-PAC Inpatient ADL T-Scale Score : 22.86 (11/06/20 1346)  ADL Inpatient CMS 0-100% Score: 85.69 (11/06/20 1346)  ADL Inpatient CMS G-Code Modifier : CM (11/06/20 1346)    Goals  Short term goals  Time Frame for Short term goals: Discharge  Short term goal 1: Seated EOB w/ Min A while completing functional activity for ~10 min  Short term goal 2: Grooming w/ set up w/ Min A  Short term goal 3: Rolling w/ Min A to decrease caregiver burden w/ LB dressing and pericare  Long term goals  Time Frame for Long term goals : LTG=STG  Patient Goals   Patient goals :  To resume therapy at facility       Therapy Time   Individual Concurrent Group Co-treatment   Time In       1019   Time Out       1100   Minutes       41        Timed Code Treatment Minutes:   26    Total Treatment Minutes:  [id number] OTR/L, [place] #[id number]

## 2020-11-06 NOTE — CARE COORDINATION
CM was advised by Terry/ Dawn that she can take pt back into facility and get he pre-cert after the fact. CM contacted Tiesha Harp for transportation was given trip # [id number] with no definitive transport time. CM messaged Dr Mickey Cash and spoke with nursing to complete the [address].     Rivera Han, SPENSERN, CCM, RN  [facility]  [phone]

## 2020-11-06 NOTE — DISCHARGE INSTR - COC
Patient Infection Status       Infection Onset Added Last Indicated Last Indicated By Review Planned Expiration Resolved Resolved By    COVID-19 11/04/20 11/05/20 11/04/20 COVID-19 11/12/20 11/18/20      Resolved    COVID-19 Rule Out 11/04/20 11/04/20 11/04/20 COVID-19 (Ordered)   11/05/20 Rule-Out Test Resulted    COVID-19 Rule Out 09/18/20 09/18/20 09/18/20 COVID-19 (Ordered)   09/19/20 Rule-Out Test Resulted            Nurse Assessment:  Last Vital Signs: /78   Pulse 88   Temp 98.4 °F (36.9 °C) (Temporal)   Resp 18   Ht 5' 7\" (1.702 m)   Wt 158 lb 11.7 oz (72 kg)   LMP 05/17/2014   SpO2 91%   BMI 24.86 kg/m²     Last documented pain score (0-10 scale): Pain Level: 0  Last Weight:   Wt Readings from Last 1 Encounters:   11/06/20 158 lb 11.7 oz (72 kg)     Mental Status:  oriented and alert    IV Access:  - None de-accessed port-a-cath    Nursing Mobility/ADLs:  Walking   Dependent  Transfer  Dependent  Bathing  Dependent  Dressing  Dependent  Toileting  Dependent  Feeding  Assisted  Med Admin  Assisted  Med Delivery   [place] [facility]    Wound Care Documentation and Therapy:  Wound 11/04/20 Coccyx Mid (Active)   Wound Etiology Pressure Stage  3 11/05/20 1400   Dressing Status New dressing applied;Clean;Dry; Intact 11/06/20 0400   Wound Cleansed Cleansed with saline 11/05/20 1400   Dressing/Treatment Moist to dry;ABD; Foam 11/06/20 0400   Dressing Change Due 11/07/20 11/06/20 0400   Wound Length (cm) 8 cm 11/05/20 1400   Wound Width (cm) 9.2 cm 11/05/20 1400   Wound Surface Area (cm^2) 73.6 cm^2 11/05/20 1400   Change in Wound Size % (l*w) 0 11/05/20 1400   Wound Assessment Eschar dry;Pink/red;Slough 11/06/20 0400   Drainage Amount Other (Comment) 11/05/20 1400   Drainage Description Other (Comment) 11/05/20 1400   Odor Malodorous/putrid 11/05/20 1400   Stephanie-wound Assessment Other (Comment) 11/05/20 1400   Margins Defined edges 11/05/20 1400   Number of days: 1        Elimination:  Continence: · Bowel: No  · Bladder: No  Urinary Catheter: None   Colostomy/Ileostomy/Ileal Conduit: No       Date of Last BM: 11-4-20    Intake/Output Summary (Last 24 hours) at 11/6/2020 1309  Last data filed at 11/6/2020 0635  Gross per 24 hour   Intake 901.18 ml   Output 1750 ml   Net -848.82 ml     I/O last 3 completed shifts: In: 1049.2 [I.V.:899.2; IV Piggyback:150]  Out: 3225 [Urine:1750]    Safety Concerns: At Risk for Falls    Impairments/Disabilities:      None    Nutrition Therapy:  Current Nutrition Therapy:   Oral diet- dysphagia 3 - meds in puree    Routes of Feeding: Oral  Liquids: No Restrictions  Daily Fluid Restriction: no  Last Modified Barium Swallow with Video (Video Swallowing Test): not done    Treatments at the Time of Hospital Discharge:   Respiratory Treatments: N/A  Oxygen Therapy:  is not on home oxygen therapy.   Ventilator:    - No ventilator support    Rehab Therapies: Physical Therapy and Occupational Therapy  Weight Bearing Status/Restrictions: No weight bearing restirctions  Other Medical Equipment (for information only, NOT a DME order):  n/a  Other Treatments: n/a    Patient's personal belongings (please select all that are sent with patient):  None    RN SIGNATURE:  Electronically signed by Clifton Marcum RN on 11/6/20 at 2:00 PM EST    CASE MANAGEMENT/SOCIAL WORK SECTION    Inpatient Status Date: 11/4/2020    Readmission Risk Assessment Score:  Readmission Risk              Risk of Unplanned Readmission:        17           Discharging to Facility/ Agency   Facility: [facility]  Phone: [phone]  · Fax: [phone]  · Address:  · Phone:  · Fax:    Dialysis Facility (if applicable)   · Name:  · Address:  · Dialysis Schedule:  · Phone:  · Fax:    / signature:GILMAR Reyez, CCM, RN  [facility]  [phone]  PHYSICIAN SECTION    Prognosis: Good    Condition at Discharge: Stable    Rehab Potential (if transferring to Rehab): Good    Recommended Labs or Other Treatments After Discharge: wound care for sacral decubitus ulcer. Physician Certification: I certify the above information and transfer of Becca Winkler  is necessary for the continuing treatment of the diagnosis listed and that she requires Located within [facility] for less 30 days.      Update Admission H&P: No change in H&P    PHYSICIAN SIGNATURE:  Electronically signed by Fabian Bryson MD on 11/6/20 at 1:10 PM EST

## 2020-11-06 NOTE — PROGRESS NOTES
Physical Therapy    Facility/Department: [facility] 5C  Initial Assessment    NAME: Shauna Landa  : 1975  MRN: [id number]    Date of Service: 2020    Discharge Recommendations:    Shauna Landa scored a / on the AM-PAC short mobility form. Current research shows that an AM-PAC score of 17 or less is typically not associated with a discharge to the patient's home setting. Based on the patient's AM-PAC score and their current functional mobility deficits, it is recommended that the patient have 3-5 sessions per week of Physical Therapy at d/c to increase the patient's independence. Please see assessment section for further patient specific details. If patient discharges prior to next session this note will serve as a discharge summary. Please see below for the latest assessment towards goals. PT Equipment Recommendations  Equipment Needed: Yes  Other: TBD at next level of care    Assessment   Body structures, Functions, Activity limitations: Decreased functional mobility ; Decreased ROM; Decreased strength;Decreased endurance;Decreased balance  Assessment: Pt present with decreased strength, range of motion, and functional mobility related to prolonged hospitalization and multiple comorbidities. Pt will benefit from skilled PT services to address the above stated deficits and promote independence. Treatment Diagnosis: decreased endurance and functional mobility  Prognosis: Poor  Decision Making: Medium Complexity  PT Education: Goals;PT Role;Plan of Care;General Safety;Pressure Relief  Patient Education: Pt will require reinforcement  Barriers to Learning: cognition, prolonged immobility  REQUIRES PT FOLLOW UP: Yes  Activity Tolerance  Activity Tolerance: Patient limited by endurance  Activity Tolerance: Pt with decrease in O2 sats to 84% with talking       Patient Diagnosis(es): The primary encounter diagnosis was Acute respiratory distress.  Diagnoses of COVID-19, Hyperkalemia, and Acute Ramped entrance  Bathroom Shower/Tub: Shower chair with back  [employer]: Standard  Bathroom Equipment: Shower chair(uses brief instead of toilet)  Bathroom Accessibility: Wheelchair accessible, Walker accessible  Home Equipment: [employer]  ADL Assistance: Needs assistance(TotalA for bathing and dressing, intermittent assist for feeding based on type of food)  Homemaking Assistance: Needs assistance(completed by facility)  Homemaking Responsibilities: No  Ambulation Assistance: Needs assistance(w/c bound, has not ambulated in 9 months)  Transfer Assistance: Needs assistance(needs help transferring to w/c)  Active : No  Additional Comments: Pt is currently living at a facility, pt reports that she was working with therapy ~1 month ago and was working on standing and fine motor skills. Pt reports that she would like to return to working w/ therapy if possible. Cognition        Objective     Observation/Palpation  Posture: Poor  Body Mechanics: unable to accomplish tenodesis   Edema: BLE pitting edema    PROM RLE (degrees)  RLE PROM: WFL  AROM RLE (degrees)  RLE AROM: Exceptions  RLE General AROM: no active motion noted  PROM LLE (degrees)  LLE PROM: WFL  AROM LLE (degrees)  LLE AROM : Exceptions  LLE General AROM: no active motion noted  Strength RLE  Comment: no active movement  Strength LLE  Comment: no active movement  Tone RLE  RLE Tone: Hypotonic  Tone LLE  LLE Tone: Hypotonic  Motor Control  Gross Motor?: Exceptions  Sensation  Overall Sensation Status: Impaired(no sensation BLE)  Bed mobility  Rolling to Left: Dependent/Total  Rolling to Right: Dependent/Total     Ambulation  Ambulation?: No  Stairs/Curb  Stairs?: No     Balance  Posture: Poor  Sitting - Static: Poor  Sitting - Dynamic: Poor  Standing - Static: Poor  Standing - Dynamic: Poor  Exercises  Comments: Performed manual stretching BLE and LB in supine. Donned podus boots to maintain LE alignment.      Plan   Plan  Times per week: 1-2/week  Current Treatment Recommendations: Strengthening, ROM, Balance Training, Endurance Training, Functional Mobility Training, Transfer Training, Patient/Caregiver Education & Training  Safety Devices  Type of devices:  All fall risk precautions in place, Bed alarm in place, Left in bed  Restraints  Initially in place: No    G-Code       OutComes Score                                                  AM-PAC Score             Goals  Short term goals  Time Frame for Short term goals: discharge  Short term goal 1: Pt will sit EOB X 5 minutes with modA of 1  Short term goal 2: Pt will transfer to chair with maxA of 1  Short term goal 3: Pt will perform bed mobility with maxA of 1  Patient Goals   Patient goals : to resume therapy at facility       Therapy Time   Individual Concurrent Group Co-treatment   Time In       1019   Time Out       1100   Minutes       41        Timed Code Treatment Minutes:  26    Total Treatment Minutes:  [place], [place] [id number]

## 2020-11-06 NOTE — DISCHARGE INSTR - COC
Continuity of Care Form    Patient Name: Ignacio Laboy   :  1975  MRN:  [id number]    Admit date:  2020  Discharge date:  ***    Code Status Order: Full Code   Advance Directives:   [facility] Documentation     Date/Time Healthcare Directive Type of Healthcare Directive Copy in [facility] [address] Agent's Name Healthcare Agent's Phone Number    [phone]  No, patient does not have an advance directive for healthcare treatment -- -- -- -- --          Admitting Physician:  Jane Capellan MD  PCP: Referring Not In System (Inactive)    Discharging Nurse: [facility] Unit/Room#: [id number]/3990-25  Discharging Unit Phone Number: ***    Emergency Contact:   Extended Emergency Contact Information  Primary Emergency Contact: [address] [facility] Phone: [phone]  Relation: Parent  Secondary Emergency Contact: [address] [facility] Phone: [phone]  Relation: Parent    Past Surgical History:  Past Surgical History:   Procedure Laterality Date    ABDOMINOPLASTY      CHOLECYSTECTOMY      GASTRIC BYPASS SURGERY      TUNNELED VENOUS PORT PLACEMENT Right 2014    Dr. Nathan Irene       Immunization History: There is no immunization history for the selected administration types on file for this patient.     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, G63, G99.2    Acute respiratory failure (HCC) J96.00    Closed nondisplaced transverse fracture of shaft of left tibia S82.225A    Closed fracture of left distal fibula C41.579V    Pulmonary embolus (HCC) I26.99    Enterococcus UTI N39.0, B95.2    Unresponsive R41.89    Acute respiratory distress R06.03       Isolation/Infection:   Isolation          Droplet Plus        Patient Infection Status Infection Onset Added Last Indicated Last Indicated By Review Planned Expiration Resolved Resolved By    COVID-19 11/04/20 11/05/20 11/04/20 COVID-19 11/12/20 11/18/20      Resolved    COVID-19 Rule Out 11/04/20 11/04/20 11/04/20 COVID-19 (Ordered)   11/05/20 Rule-Out Test Resulted    COVID-19 Rule Out 09/18/20 09/18/20 09/18/20 COVID-19 (Ordered)   09/19/20 Rule-Out Test Resulted          Nurse Assessment:  Last Vital Signs: /78   Pulse 88   Temp 98.4 °F (36.9 °C) (Temporal)   Resp 18   Ht 5' 7\" (1.702 m)   Wt 158 lb 11.7 oz (72 kg)   LMP 05/17/2014   SpO2 91%   BMI 24.86 kg/m²     Last documented pain score (0-10 scale): Pain Level: 0  Last Weight:   Wt Readings from Last 1 Encounters:   11/06/20 158 lb 11.7 oz (72 kg)     Mental Status:  {IP PT MENTAL STATUS:20030}    IV Access:  { MARY IV ACCESS:328761268}    Nursing Mobility/ADLs:  Walking   {[facility] DME BXTV:624687477}  Transfer  {[facility] DME GHAA:961894382}  Bathing  {[facility] DME LGJJ:128843280}  Dressing  {[facility] DME KVSW:505942857}  Toileting  {[facility] DME LXWR:299696839}  Feeding  {[facility] DME YMIU:833344828}  Med Admin  {[facility] DME LZQE:791068796}  Med Delivery   { MARY MED Delivery:772916368}    Wound Care Documentation and Therapy:  Wound 11/04/20 Coccyx Mid (Active)   Wound Etiology Pressure Stage  3 11/05/20 1400   Dressing Status New dressing applied;Clean;Dry; Intact 11/06/20 0400   Wound Cleansed Cleansed with saline 11/05/20 1400   Dressing/Treatment Moist to dry;ABD; Foam 11/06/20 0400   Dressing Change Due 11/07/20 11/06/20 0400   Wound Length (cm) 8 cm 11/05/20 1400   Wound Width (cm) 9.2 cm 11/05/20 1400   Wound Surface Area (cm^2) 73.6 cm^2 11/05/20 1400   Change in Wound Size % (l*w) 0 11/05/20 1400   Wound Assessment Eschar dry;Pink/red;Slough 11/06/20 0400   Drainage Amount Other (Comment) 11/05/20 1400   Drainage Description Other (Comment) 11/05/20 1400   Odor Malodorous/putrid 11/05/20 1400   Stephanie-wound Assessment Other (Comment) 11/05/20 1400 Margins Defined edges 20 1400   Number of days: 1        Elimination:  Continence:   · Bowel: {YES / ND:33433}  · Bladder: {YES / NC:68580}  Urinary Catheter: {Urinary Catheter:163041339}   Colostomy/Ileostomy/Ileal Conduit: {YES / PJ:48001}       Date of Last BM: ***    Intake/Output Summary (Last 24 hours) at 2020 1308  Last data filed at 2020 0635  Gross per 24 hour   Intake 901.18 ml   Output 1750 ml   Net -848.82 ml     I/O last 3 completed shifts:   In: 1049.2 [I.V.:899.2; IV Piggyback:150]  Out: 5171 [Urine:1750]    Safety Concerns:     [address] Safety Concerns:439525567}    Impairments/Disabilities:      [address] Impairments/Disabilities:191015827}    Nutrition Therapy:  Current Nutrition Therapy:   [address] Diet List:471747880}    Routes of Feeding: {CHP DME Other Feedings:293650283}  Liquids: {Slp liquid thickness:37476}  Daily Fluid Restriction: {CHP DME Yes amt example:355380907}  Last Modified Barium Swallow with Video (Video Swallowing Test): {Done Not Done FEIY:406954326}    Treatments at the Time of Hospital Discharge:   Respiratory Treatments: ***  Oxygen Therapy:  {Therapy; copd oxygen:32150}  Ventilator:    { CC Vent XLPW:017547210}    Rehab Therapies: {THERAPEUTIC INTERVENTION:8861140453}  Weight Bearing Status/Restrictions: [address] [employer]  Weight Bearin}  Other Medical Equipment (for information only, NOT a DME order):  {EQUIPMENT:643842252}  Other Treatments: ***    Patient's personal belongings (please select all that are sent with patient):  {P DME Belongings:103878351}    RN SIGNATURE:  {Esignature:837349657}    CASE MANAGEMENT/SOCIAL WORK SECTION    Inpatient Status Date: 2020    Readmission Risk Assessment Score:  Readmission Risk              Risk of Unplanned Readmission:        17           Discharging to Facility/ Agency   Name: [facility]: [facility]  Phone: [phone]  · Fax: [phone]  · Address:  · Phone:  · Fax:    Dialysis Facility (if applicable) · Name:  · Address:  · Dialysis Schedule:  · Phone:  · Fax:    / signature: GILMAR Ngo, CCM, RN  [facility]  [phone]  PHYSICIAN SECTION    Prognosis: {Prognosis:6187625232}    Condition at Discharge: [address] Patient Condition:148822994}    Rehab Potential (if transferring to Rehab): {Prognosis:2840856426}    Recommended Labs or Other Treatments After Discharge: ***    Physician Certification: I certify the above information and transfer of Ignacio Laboy  is necessary for the continuing treatment of the diagnosis listed and that she requires {Admit to Appropriate Level of Care:02788} for {GREATER/LESS:207191619} 30 days.      Update Admission H&P: {CHP DME Changes in WSXOC:896136981}    PHYSICIAN SIGNATURE:  {Esignature:861568069}

## 2020-11-06 NOTE — PROGRESS NOTES
CLINICAL BEDSIDE SWALLOWING EVALUATION  Speech Therapy Department    Patient Name:  Johnny Morales  :  1975  Pain level: Pt did not report pain  Medical Diagnosis:   Unresponsive [R41.89]  Unresponsive [R41.89]    HPI: Per chart: Richar Curran a 50 y.o. female presents to the emergency department today for evaluation for altered mental status and unresponsiveness.  The patient is from [facility], and the only history that we are able to obtain is that she is there for drug rehab.  The patient does have a history of adult failure to thrive, CHF, hypertension.  Unsure of the last seen normal, per EMS report, nursing home staff went in to see the patient today, and they noticed that she was unresponsive. Tino Floyd is a full code.  Apparently has been tested positive for COVID-19. We do not have this test result in the chart and we were not able to get a hold of [place] to confirm when this actually was done No other history is able to be obtained at this time. The patient is intubated and sedated at the time of my evaluation. CT of Chest:  1. Negative for pulmonary embolus. 2. Bibasilar atelectasis versus pneumonia. Treatment Diagnosis: Mild Oropharyngeal Dysphagia    Impressions: Pt was seen sitting upright in bed, she is currently on 2L of O2 via nasal cannula. Pt was extubated on 20. Pt reported no difficulty with swallowing and reported she consumes a regular texture diet with thin liquids at facility. Pt did report she \"got choked\" when she took a drink of water yesterday. Various textures were provided to assess swallow function. Pt presents with mild oropharyngeal dysphagia characterized by prolonged mastication, suspected premature bolus loss to pharynx, mildly delayed swallow initiation, and reduced laryngeal elevation upon manual palpation.  Thin liquids via cup revealed intermittent use of two swallows, one instance of coughing was noted following thin liquids given summary.       Emiliano Borrego M.A., [facility]  Speech-Language Pathologist

## 2020-11-09 PROBLEM — J96.00 ACUTE RESPIRATORY FAILURE (HCC): Status: RESOLVED | Noted: 2020-01-01 | Resolved: 2020-01-01

## 2020-11-09 PROBLEM — A41.9 SEPSIS (HCC): Status: ACTIVE | Noted: 2020-01-01

## 2020-11-09 PROBLEM — R41.89 UNRESPONSIVE: Status: RESOLVED | Noted: 2020-01-01 | Resolved: 2020-01-01

## 2020-11-09 PROBLEM — R53.2 FUNCTIONAL QUADRIPLEGIA (HCC): Status: ACTIVE | Noted: 2020-01-01

## 2020-11-09 PROBLEM — L89.154 PRESSURE INJURY OF SACRAL REGION, STAGE 4 (HCC): Status: ACTIVE | Noted: 2020-01-01

## 2020-11-09 NOTE — ED NOTES
Call received from mother Olguin. Updated on POC with pt permission.        Monique Bowman RN  11/09/20 4855

## 2020-11-09 NOTE — ED NOTES
Pharmacy Medication History Note      List of current medications patient is taking is complete. Source of information: [employer]    Changes made to medication list:  Medications flagged for removal (include reason, ex. noncompliance):  none    Medications removed (include reason, ex. therapy complete or physician discontinued):  Eliquis- dose adjustment    Medications added/doses adjusted:  Suboxone 8 mg/2mg QAM- Nursing home stated suboxone 8 mg administered 11/8, and patient became unresponsive. Narcan was administered and patient responded. 2 mg not administered 11/8. Subxone 2 mg/0.5mg QHS    Other notes (ex. Recent course of antibiotics, Coumadin dosing):  Denies use of other OTC or herbal medications. Last dose times updated. Peter Thorpe, DavidsonD  PGY1 Pharmacy Resident  [id number]    Home Medicine Reconciliation:    No current facility-administered medications on file prior to encounter. Current Outpatient Medications on File Prior to Encounter   Medication Sig Dispense Refill    apixaban (ELIQUIS) 5 MG TABS tablet Take 5 mg by mouth 2 times daily      buprenorphine-naloxone (SUBOXONE) 8-2 MG FILM SL film Place 1 Film under the tongue every morning. buprenorphine-naloxone (SUBOXONE) 2-0.5 MG FILM SL film Place 1 Film under the tongue every evening.       Melatonin 10 MG TABS Take 10 mg by mouth nightly as needed      QUEtiapine (SEROQUEL) 50 MG tablet Take 0.5 tablets by mouth nightly 60 tablet 3    folic acid (FOLVITE) 1 MG tablet Take 1 tablet by mouth daily 30 tablet 3    vitamin B-12 (CYANOCOBALAMIN) 500 MCG tablet Take 500 mcg by mouth daily      promethazine (PHENERGAN) 25 MG tablet Take 25 mg by mouth every 12 hours      loperamide (IMODIUM) 2 MG capsule Take 2 mg by mouth every 12 hours      ibuprofen (ADVIL;MOTRIN) 800 MG tablet Take 800 mg by mouth every 12 hours as needed for Pain       pantoprazole (PROTONIX) 40 MG tablet Take 40 mg by mouth daily      busPIRone (BUSPAR) 5 MG tablet Take 5 mg by mouth 2 times daily       Calcium Carb-Cholecalciferol (CALCIUM 600-D PO) Take 1 tablet by mouth 2 times daily       Multiple Vitamins-Minerals (THERAPEUTIC MULTIVITAMIN-MINERALS) tablet Take 1 tablet by mouth daily      Ascorbic Acid (VITAMIN C) 500 MG CAPS Take 500 mg by mouth daily 30 capsule 3    [DISCONTINUED] apixaban (ELIQUIS DVT/PE STARTER PACK) 5 MG TABS tablet Take 10 mg (2 tablets) orally twice daily for 7 days, then take 5 mg (1 tablet) orally twice daily thereafter.  (Patient taking differently: Take 5 mg by mouth 2 times daily Take 10 mg (2 tablets) orally twice daily for 7 days, then take 5 mg (1 tablet) orally twice daily thereafter.) 74 tablet 2

## 2020-11-09 NOTE — ED NOTES
Call received from [place] at [facility] [employer]. Reports that \"pt was narcaned from pain meds yesterday, past out twice last night during personal care & had a fever of 104 this morning. \"  Updated on POC.        Jenni Dior RN  11/09/20 8657

## 2020-11-09 NOTE — ED NOTES
One episode of urinary incontinence; pt brief changed with this nurse & EVELIN Sommers present. Noticed large open area to coccyx; drainage & odor noticed. Dr Joslyn Caruso aware; verbally ordered to pack/dress wound. Wound packed with 5 wet 4x4s, 2 dry 4x4s on top & dressed with tegaderm. Photo take for electronic chart by Marko Hernandez.        Tiana Scott RN  11/09/20 2441

## 2020-11-09 NOTE — ED PROVIDER NOTES
[facility]        Pt Name: Esha Nguyen  MRN: [id number]  [place] 1975  Date of evaluation: 11/9/2020  Provider: Taiwo Ritter PA-C  PCP: Referring Not In System (Inactive)     I have seen and evaluated this patient with my supervising physician Alis Marshall MD.    [address] [place] [address]       Chief Complaint   Patient presents with    Fever     Arrived by [facility] from [facility] rt fever. Recent COVID positive dx.  99.7 oral.         HISTORY OF PRESENT ILLNESS   (Location, Timing/Onset, Context/Setting, Quality, Duration, Modifying Factors, Severity, Associated Signs and Symptoms)  Note limiting factors. Esha Nguyen is a 50 y.o. female who presents to the emergency department today for evaluation for a [facility] facility for concerns of a fever. She was seen in the emergency room 5 days ago, she was intubated at that time. Patient was diagnosed with Covid. Patient was discharged from the hospital 2 days ago. Patient states that she has been complaining of a mild headache, she is rating her pain as a 7/10. This is not the worst headache of her life. This is not an atypical headache. She has no visual changes. She has no numbness, tingling or weakness. Patient denies any chest pain or shortness of breath. She denies any abdominal pain. No nausea, vomiting or diarrhea. She has no cough or congestion. Patient is afebrile when she arrives to the ED. She denies any urinary symptoms. She otherwise has no complaints. She has a history of adult failure to thrive, CHF, hypertension    Nursing Notes were all reviewed and agreed with or any disagreements were addressed in the HPI. REVIEW OF SYSTEMS    (2-9 systems for level 4, 10 or more for level 5)     Review of Systems   Constitutional: Negative for activity change, appetite change, chills and fever. HENT: Negative for congestion and rhinorrhea. Respiratory: Negative for cough and shortness of breath. Cardiovascular: Negative for chest pain. Gastrointestinal: Negative for abdominal pain, diarrhea, nausea and vomiting. Genitourinary: Negative for difficulty urinating, dysuria and hematuria. Neurological: Positive for headaches. Negative for weakness and numbness. Positives and Pertinent negatives as per HPI. Except as noted above in the ROS, all other systems were reviewed and negative. PAST MEDICAL HISTORY     Past Medical History:   Diagnosis Date    Adult failure to thrive     Allergic     Anemia     related to gastric bypass, followed by Dr. Raquel Woodall [facility]congestive heart failure) [facility]     Chronic neck pain     Copper deficiency     Depression     Hypertension     Sciatica     Seizures ([facility] [address] [address] 3/2014    hypoglycemia and/or benzo withdrawal    Vitamin B12 deficiency neuropathy ([facility]          SURGICAL HISTORY     Past Surgical History:   Procedure Laterality Date    ABDOMINOPLASTY      CHOLECYSTECTOMY      GASTRIC BYPASS SURGERY      TUNNELED VENOUS PORT PLACEMENT Right 5/2/2014    Dr. Keny Mcgraw       Previous Medications    APIXABAN (ELIQUIS) 5 MG TABS TABLET    Take 5 mg by mouth 2 times daily    ASCORBIC ACID (VITAMIN C) 500 MG CAPS    Take 500 mg by mouth daily    BUPRENORPHINE-NALOXONE (SUBOXONE) 2-0.5 MG FILM SL FILM    Place 1 Film under the tongue every evening. BUPRENORPHINE-NALOXONE (SUBOXONE) 8-2 MG FILM SL FILM    Place 1 Film under the tongue every morning.     BUSPIRONE (BUSPAR) 5 MG TABLET    Take 5 mg by mouth 2 times daily     CALCIUM CARB-CHOLECALCIFEROL (CALCIUM 600-D PO)    Take 1 tablet by mouth 2 times daily     FOLIC ACID (FOLVITE) 1 MG TABLET    Take 1 tablet by mouth daily    IBUPROFEN (ADVIL;MOTRIN) 800 MG TABLET    Take 800 mg by mouth every 12 hours as needed for Pain     LOPERAMIDE (IMODIUM) 2 MG CAPSULE    Take 2 mg by mouth every 12 hours Cardiovascular:      Rate and Rhythm: Normal rate and regular rhythm. Heart sounds: No murmur. Comments: 2+ pitting edema to bilateral lower legs. Pulmonary:      Effort: Pulmonary effort is normal. No respiratory distress. Comments: Diminished aeration and rales to bilateral bases  Abdominal:      General: Bowel sounds are normal. There is no distension. Palpations: Abdomen is soft. Tenderness: There is no abdominal tenderness. There is no guarding. Musculoskeletal: Normal range of motion. Skin:     General: Skin is warm and dry. Comments: Large sacral wound noted, please see picture below for further   Neurological:      Mental Status: She is alert and oriented to person, place, and time.    Psychiatric:         Behavior: Behavior normal.             DIAGNOSTIC RESULTS   LABS:    Labs Reviewed   CBC WITH AUTO DIFFERENTIAL - Abnormal; Notable for the following components:       Result Value    WBC 3.2 (*)     RBC 3.35 (*)     Hemoglobin 10.1 (*)     Hematocrit 32.8 (*)     MCHC 30.9 (*)     Lymphocytes Absolute 0.2 (*)     All other components within normal limits    Narrative:     Performed at:  [facility] [address], [place], [facility]   Phone [phone]   COMPREHENSIVE METABOLIC PANEL - Abnormal; Notable for the following components:    Chloride 97 (*)     CO2 37 (*)     BUN 6 (*)     CREATININE <0.5 (*)     Calcium 8.2 (*)     Total Protein 5.1 (*)     Alb 2.4 (*)     Albumin/Globulin Ratio 0.9 (*)     ALT 42 (*)     AST 41 (*)     All other components within normal limits    Narrative:     Performed at:  [facility] [address], [place], [facility]   Phone [phone]   LIPASE - Abnormal; Notable for the following components:    Lipase 9.0 (*)     All other components within normal limits    Narrative:     Performed at:  [facility]  [address] [facility], [address]   Phone [phone]   APTT - Abnormal; Notable for the following components:    aPTT 39.9 (*)     All other components within normal limits    Narrative:     Performed at:  [facility] [address], [facility], [address]   Phone [phone]   PROTIME-INR - Abnormal; Notable for the following components:    Protime 14.1 (*)     INR 1.21 (*)     All other components within normal limits    Narrative:     Performed at:  [facility] [address], [facility], [address]   Phone [phone]   BRAIN NATRIURETIC PEPTIDE - Abnormal; Notable for the following components:    Pro- (*)     All other components within normal limits    Narrative:     Performed at:  [facility] [address], [facility], [address]   Phone [phone]   CULTURE, BLOOD 1   CULTURE, BLOOD 2   CULTURE, WOUND    Narrative:     ORDER#: [id number]                          ORDERED BY: Omid Yee  SOURCE: Coccyx                             COLLECTED:  11/09/20 14:00  ANTIBIOTICS AT DEBORA.:                      RECEIVED :  11/09/20 14:10  Performed at:  [address]   Phone [phone]   TROPONIN    Narrative:     Performed at:  [facility] [address], [facility], [address]   Phone [phone]   LACTATE, SEPSIS    Narrative:     Performed at:  [facility] [address], [facility], [address]   Phone [phone]    Narrative:     Alexander Gutierrez  [facility] [place] tel. [phone],  Rejected UAR Name/Called to: Miguel Jewell, 11/09/2020 14:36, by [address] [place] [address] CANCELLED 11/09/2020 14:35, Rejected: Quantity not sufficient;  Miguel Jewell.   Performed at:  [facility] [address], [facility], [address]   Phone [phone] LACTATE, SEPSIS   C-REACTIVE PROTEIN   URINE RT REFLEX TO CULTURE       All other labs were within normal range or not returned as of this dictation. EKG: All EKG's are interpreted by the Emergency Department Physician in the absence of a cardiologist.  Please see their note for interpretation of EKG. RADIOLOGY:   Non-plain film images such as CT, Ultrasound and MRI are read by the radiologist. Plain radiographic images are visualized and preliminarily interpreted by the ED Provider with the below findings:        Interpretation per the Radiologist below, if available at the time of this note:    CT LUMBAR SPINE WO CONTRAST   Final Result   Unremarkable non-contrast CT of the lumbar spine. No discrete sacral bone erosion or destruction evident. Correlate with CT pelvis for description of soft tissues adjacent to the   sacrum which are incompletely included in the field of view on this exam.         CT PELVIS WO CONTRAST Additional Contrast? None   Preliminary Result   Deep left posterior soft tissue ulcer extending within 2 mm of the proximal   coccyx. No well-defined bony erosions are seen to suggest acute   osteomyelitis. There is fat stranding adjacent to the ulcer compatible with   cellulitis. Nonspecific prominent presacral fat stranding. Simple-appearing free fluid   along the right paracolic gutter. There is fat stranding extending from the skin to the level of the right   ischium compatible with cellulitis. Generalized subcutaneous edema about the pelvis and upper thighs. There is a small hyperdensity at the right bladder base. Unclear if this is   contrast from a recent CT or a true bladder lesion. This could be followed   up with CT or further assessed with a cystoscopy as clinically indicated. There is also air in the bladder. Correlate for recent intervention.          CT HEAD WO CONTRAST   Final Result   Evaluation of the parenchyma, skull, and soft tissues at the vertex is motion   degraded. Otherwise, no acute intracranial abnormality. Bifrontal volume loss, greater than expected for age. Chronic sphenoid and ethmoid sinus mucosal disease. XR CHEST PORTABLE   Final Result   Small bilateral effusions and bibasilar airspace disease. Ct Head Wo Contrast    Result Date: 11/4/2020  EXAMINATION: CT OF THE HEAD WITHOUT CONTRAST  11/4/2020 9:57 am TECHNIQUE: CT of the head was performed without the administration of intravenous contrast. Dose modulation, iterative reconstruction, and/or weight based adjustment of the mA/kV was utilized to reduce the radiation dose to as low as reasonably achievable. COMPARISON: September 18, 2020 HISTORY: ORDERING SYSTEM PROVIDED HISTORY: altered TECHNOLOGIST PROVIDED HISTORY: Reason for exam:->altered Has a \"code stroke\" or \"stroke alert\" been called? ->No Is the patient pregnant?->No Reason for Exam: altered , vented pt Acuity: Acute Type of Exam: Initial FINDINGS: BRAIN/VENTRICLES: There is no acute intracranial hemorrhage, mass effect or midline shift. No abnormal extra-axial fluid collection. The gray-white differentiation is maintained without evidence of an acute infarct. There is no evidence of hydrocephalus. ORBITS: The visualized portion of the orbits demonstrate no acute abnormality. SINUSES: The visualized paranasal sinuses and mastoid air cells demonstrate no acute abnormality. SOFT TISSUES/SKULL:  No acute abnormality of the visualized skull or soft tissues. No acute intracranial abnormality. Stable compared to prior study     Xr Chest Portable    Result Date: 11/9/2020  EXAMINATION: ONE XRAY VIEW OF THE CHEST 11/9/2020 9:17 am COMPARISON: 11/04/2020 HISTORY: ORDERING SYSTEM PROVIDED HISTORY: SOB TECHNOLOGIST PROVIDED HISTORY: Reason for exam:->SOB Reason for Exam: Fever (Arrived by  EMS from [place] rt fever.  Recent COVID positive dx. 99.7 oral. ) Acuity: Acute Type of Exam: Initial female who presents to the emergency department today for evaluation for a possible fever. Patient had a fever apparently at [facility], although she is afebrile when she arrives here. Patient was recently admitted, and was intubated at her previous admission. She is Covid positive. Patient is complaining of a mild headache, this is not the worst headache of her life. Atypical headache. On physical exam she does have rales to her bilateral bases and 2+ pitting edema to her legs. She does have a sacral wound noted, please see imaging above. X-ray shows mild bilateral effusions and bibasilar airspace disease    CBC shows no evidence of leukocytosis, there is a mild anemia. CMP is unremarkable. Her lactic acid is normal.    She does have a large sacral wound, there is no CT imaging of any osteomyelitis, she was given broad-spectrum antibiotics. She does not meet severe sepsis, therefore does not need the 30 mL/kg fluid bolus. Dr. Karolina Dodson has been paged for admission. Patient is stable for admission      FINAL IMPRESSION      1. Wound of sacral region, initial encounter    2. Sepsis, due to unspecified organism, unspecified whether acute organ dysfunction present [facility])          DISPOSITION/PLAN   DISPOSITION Decision To Admit 11/09/2020 02:19:39 PM      PATIENT REFERREDTO:  No follow-up provider specified. DISCHARGE MEDICATIONS:  New Prescriptions    No medications on file       DISCONTINUED MEDICATIONS:  Discontinued Medications    APIXABAN (ELIQUIS DVT/PE STARTER PACK) 5 MG TABS TABLET    Take 10 mg (2 tablets) orally twice daily for 7 days, then take 5 mg (1 tablet) orally twice daily thereafter.               (Please note that portions of this note were completed with a voice recognition program.  Efforts were made to edit the dictations but occasionally words are mis-transcribed.)    Hector Farah PA-C (electronically signed)            Hector Farah PA-C  11/09/20 2257

## 2020-11-09 NOTE — ED NOTES
Arrived by [facility] from [place] rt fever. Recent COVID positive dx. 99.7 oral.  Monitors in place.        Arsh Gordon RN  11/09/20 3773

## 2020-11-09 NOTE — CARE COORDINATION
Discharge Planning Assessment    RN/SW discharge planner met with patient/ (and family member) to discuss reason for admission, current living situation, and potential needs at the time of discharge    Pt in ED d/t wound of sacral region & sepsis    Demographics/Insurance verified:  Yes    Current type of dwelling:  LTC at [facility]    Patient from ECF/SW confirmed with:  Confirmed w/Lavel in admissions last week. Pt just discharged last Friday. Left message for Lavel informing of readmission. Living arrangements:  LTC    Tentative discharge plan:  Back to [facility] at the time of discharge: Will need transport back.     Electronically signed by TOM Decker, ASHW on 11/9/2020 at 6:09 PM

## 2020-11-10 PROBLEM — S31.000A SACRAL WOUND: Status: ACTIVE | Noted: 2020-01-01

## 2020-11-10 PROBLEM — J96.01 ACUTE RESPIRATORY FAILURE WITH HYPOXEMIA (HCC): Status: ACTIVE | Noted: 2020-01-01

## 2020-11-10 NOTE — PROGRESS NOTES
Comprehensive Nutrition Assessment    Type and Reason for Visit:  Initial, Positive Nutrition Screen, Wound(Wt loss, poor appetite, wounds.)    Nutrition Recommendations/Plan:   1. Continue current diet  2. Encourage po  3. RD ordered Ensure Enlive TID    Nutrition Assessment:  Pt is at risk for nutritional compromise d/t increased nutrient needs for wound healing. Pt has a stage 3 pressure injury that is infected. Pt has not been eating well. PO is currently 1-25% at meals. Pt may have lost wt, however current wt is estimated. RD to order Ensure Enlive TID to help with wound healing. Malnutrition Assessment:  Malnutrition Status:  Insufficient data    Context:  Chronic Illness     Findings of the 6 clinical characteristics of malnutrition:  Energy Intake:  Mild decrease in energy intake (Comment)  Weight Loss:  Unable to assess     Body Fat Loss:  Unable to assess(Pt in droplet precautions)     Muscle Mass Loss:  Unable to assess    Fluid Accumulation:  7 - Severe Extremities   Strength:  Not Performed    Estimated Daily Nutrient Needs:  Energy (kcal):  9614-3311 jill (25-30 cals/72kg); Weight Used for Energy Requirements:  Current     Protein (g):   gms (1.3-1.5 gms/72kg); Weight Used for Protein Requirements:  Current        Fluid (ml/day):   ; Method Used for Fluid Requirements:  1 ml/kcal      Nutrition Related Findings:  BLE +4; NaCl @ 100 ml/hr. I/O's: +420      Wounds:  Stage III, Pressure Injury       Current Nutrition Therapies:    DIET GENERAL; Anthropometric Measures:  · Height: 5' 7\" (170.2 cm)  · Current Body Weight: 158 lb (71.7 kg)      · Ideal Body Weight: 135 lbs; % Ideal Body Weight     · BMI: 24.7  · Adjusted Body Weight: 177.8; Quadraplegia   · Adjusted BMI: 27.8    · BMI Categories: Normal Weight (BMI 18.5-24. 9)       Nutrition Diagnosis:   · Increased nutrient needs related to increase demand for energy/nutrients as evidenced by wounds(stage 3 infected pressure ulcer)      Nutrition Interventions:   Food and/or Nutrient Delivery:  Continue Current Diet, Start Oral Nutrition Supplement  Nutrition Education/Counseling:  No recommendation at this time   Coordination of Nutrition Care:  Continue to monitor while inpatient    Goals:  PO greater than 50% at meals/supp       Nutrition Monitoring and Evaluation:   Behavioral-Environmental Outcomes:  None Identified   Food/Nutrient Intake Outcomes:  Food and Nutrient Intake, Supplement Intake  Physical Signs/Symptoms Outcomes:  Weight, Skin     Discharge Planning:          Electronically signed by Gagandeep Armenta RD, CNSC, LD on 11/10/20 at 2:08 PM EST    Contact: [phone]

## 2020-11-10 NOTE — PROGRESS NOTES
4 Eyes Skin Assessment     The patient is being assess for  Admission    I agree that 2 RN's have performed a thorough Head to Toe Skin Assessment on the patient. ALL assessment sites listed below have been assessed. Areas assessed by both nurses: Jodi Escobedo RN and Mary Ann RN  [x]   Head, Face, and Ears   [x]   Shoulders, Back, and Chest  [x]   Arms, Elbows, and Hands   [x]   Coccyx, Sacrum, and IschIum  [x]   Legs, Feet, and Heels        Does the Patient have Skin Breakdown?   Yes LDA WOUND CARE was Initiated documentation include the Stephanie-wound, Wound Assessment, Measurements, Dressing Treatment, Drainage, and Color\",         Lv Prevention initiated:  Yes   Wound Care Orders initiated:  Yes      [id number] nurse consulted for Pressure Injury (Stage 3,4, Unstageable, DTI, NWPT, and Complex wounds), New and Established Ostomies:  Yes      Nurse 1 eSignature: Electronically signed by Yovany Kruse RN on 11/10/20 at 1:42 AM EST    **SHARE this note so that the co-signing nurse is able to place an eSignature**    Nurse 2 eSignature: Electronically signed by Jorge Luis Scherer RN on 11/10/20 at 6:49 AM EST

## 2020-11-10 NOTE — PROGRESS NOTES
Pts O2 dropping to the 80s on 4L NC. Called respiratory, instructed to increase NC to 7L for now and that he'd be up to see pt soon. Respiratory placed patient on 12L high flow. Sats , will wean O2 as tolerated.

## 2020-11-10 NOTE — PROGRESS NOTES
Pt screaming in room states she cannot breathe. Pt has removed O2 states she took it off so she could breathe. Did not want RN to replace O2 until she could breathe. Pt educated that she needs the nasal cannual in nose to get O2.

## 2020-11-10 NOTE — PROGRESS NOTES
Pt seen in  ED, admission completed. Pt is alert and oriented, Pt lives at [facility], and is being admitted for Sacral decubitus ulcer and fever, pt also with recent Covid 19 diagnosis. Plan of care updated, all questions answered.

## 2020-11-10 NOTE — H&P
[facility]                     [address] [facility], [address]                              HISTORY AND PHYSICAL    PATIENT NAME: Ele Landeros                      :        1975  MED REC NO:   [id number]                          ROOM:       [id number]  ACCOUNT NO:   [de-identified]                           ADMIT DATE: 2020  PROVIDER:     Nikki Alvarez MD    HISTORY OF PRESENT ILLNESS:  The patient is a 47-year-old quadriplegic  woman, came to the emergency room with history of elevated temperature  and altered mental status. The patient recently was COVID positive. The patient also has somewhat increased lethargy. A 47-year-old woman,  who is institutionalized. The patient has a foul-smelling drainage from  an infected decubitus, which is very extensive and bone deep in the  sacrococcygeal region. The patient was seen in the emergency room 5  days ago and was intubated at that time. The patient was diagnosed with  COVID. The patient was discharged from hospital 2 days ago. The  patient stated that she has been complaining of mild headache. She is  rating her pain at 7/10. There are no visual changes. No convulsions. No numbness, no tingling. No weakness. The patient is nonambulatory,  functionally quadriplegic. There is no nausea, vomiting, diarrhea, no  cough or congestion. The patient is afebrile when she arrived into the  emergency room. She has adult failure to thrive situation. PAST MEDICAL HISTORY:  Cervical myelopathy, vitamin B12, seizure  activity, sciatica, pulmonary embolism, hypertension, depression, copper  deficiency, chronic neck pain, CHF, calculus of the kidney, anxiety  neurosis, anemia, allergic rash, adult failure to thrive. PAST SURGICAL HISTORY:  Pertinent for tunneled venous port placement,  abdominoplasty, cholecystectomy and gastric bypass surgery. FAMILY HISTORY:  Both her parents are alive.   Mother has dementia, high  blood pressure and hypothyroidism and thyroid cancer. Father is also  alive, has sclerotic heart disease, stroke and had history of heart  attack. MEDICATIONS:  Eliquis, ascorbic acid, buprenorphine, naloxone, BuSpar,  calcium carbonate, folic acid, ibuprofen, loperamide, melatonin,  multivitamin, pantoprazole, promethazine, quetiapine and vitamin B12. ALLERGIES:  The patient is allergic to ACETAMINOPHEN, CODEINE, MORPHINE,  PENICILLIN and RELATED COMPOUND. SOCIAL HISTORY:  She is a  woman. She has three children, who  are grown. She smokes one pack per day. No history of alcohol or drug  abuse, although she was on _____ pain pills and was in [address]. She used to work as an  to a [employer] called  [address] [place] [address] in [place]. There is no history of illicit street drug abuse. REVIEW OF SYSTEMS:  Negative for loss of consciousness. No nausea or  vomiting. Does have elevated temperature, which is moderate. No vision  loss. The patient cannot feed herself. She is functionally totally  quadriplegic of spastic nature. No visual blurring. No hallucination. No hostility, no angina pectoris. No orthopnea. No paroxysmal  nocturnal dyspnea. No genitourinary complaint except the patient is  incontinent to urine and feces. The patient is functionally  quadriplegic. PHYSICAL EXAMINATION:  GENERAL:  Alert, awake, oriented x2, moderately confused middle-aged  woman looking older than her stated age. VITAL SIGNS:  Her temperature is 99.8, it was as high as 100.3, blood  pressure 133/79, respirations 18, heart rate 91. HEENT:  Oral mucosa dry. SKIN:  Warm and dry. NECK:  Supple. There is no carotid bruit. No lymphadenopathy or  thyromegaly. LUNGS:  Vesicular breath sounds. Poor inspiration. No crackles or  wheezing. HEART:  Regular rate and rhythm. S1, S2 without any S3 or S4 gallop. ABDOMEN:  Soft, nontender.   Bowel sounds present. EXTREMITIES:  Shows extensive 10 x 8 cm sacrococcygeal decubitus which  is bone deep, has infected drainage from it and also has slough all over  the wound. NEUROLOGICAL:  The patient is functionally totally quadriplegic, more  consistent with spastic paralysis. LABORATORY DATA:  Lab evaluation shows sodium 140, potassium 3.6,  chloride 97, CO2 37, BUN 6, creatinine 0.5, anion gap is 6, GFR more  than 60. Blood sugar 88, calcium 8.2, procalcitonin level is 0.13.   C-reactive protein 24.5. ProBNP level is 778. Troponin is less than  0.01. Albumin 2.4, globulin 2.7, albumin/globulin ratio 0.9, AST and  ALT within normal limits at 42 and 41, lipase level is 9.0. Urine drug  screen done few days ago was normal.  Acetaminophen level less than 4,  salicylate level less than 0.3. ASSESSMENT:  Infected decubitus sepsis, functional quadriplegia,  protein-calorie malnutrition, vitamin B12 deficiency, pulmonary embolus  per history, copper deficiency, mild _____ neuropathy. PLAN:  Get her admitted. Treat her with IV hydration, DVT prophylaxis. IV cefepime and vancomycin. Pain relief, psychoactive meds to be  adjusted.         Eric Tenorio MD    D: 11/09/2020 22:07:49       T: 11/09/2020 22:13:30     [id number]  Job#: [id number]     Doc#: [id number]    CC:

## 2020-11-10 NOTE — PROGRESS NOTES
Pt with increaseing oxygen requirment with worsening sob. And now on  60l optiflow  Up from 6l NC . She is covid positive and also sepsis from [facility] and decub ulcer. Currently getting iv vanc and cepefime. Asked by primary care to evaluate patient    She will be started in decadron, and will initiate remdisivir as well   ID and critical care consult. - Transfer to ICU.

## 2020-11-10 NOTE — PROGRESS NOTES
Pt unable to maintain O2 sats at this time. Pt was on 6L high flow nasal canula. Pt found at 78%. Pt titrated up to 15 liters NC then was put on non rebreather on 15L O2 sats remain 82-85%. Charge RN and respiratory updated.  Respiratory to see PT.

## 2020-11-10 NOTE — PLAN OF CARE
Organism  Lab Results   Component Value Date/Time    ORG Escherichia coli (A) 11/09/2020 02:00 PM     Surgical culture  No results found for: CXSURG  Blood culture 1  Results in Past 30 Days  Result Component Current Result Ref Range Previous Result Ref Range   Blood Culture, Routine No Growth to date. Any change in status will be called. (11/9/2020)  No Growth after 4 days of incubation. (11/4/2020)      Blood culture 2  Results in Past 30 Days  Result Component Current Result Ref Range Previous Result Ref Range   Culture, Blood 2 No Growth to date. Any change in status will be called. (11/9/2020)  No Growth after 4 days of incubation. (11/4/2020)      Fecal occult  No results found for requested labs within last 30 days. GI bacterial pathogens by PCR  No results found for requested labs within last 30 days. C. difficile  No results found for requested labs within last 30 days. Urine culture  Lab Results   Component Value Date    LABURIN >100,000 CFU/ml 09/18/2020       Pathology:  No relevant pathology     Imaging:  I have personally reviewed the following films:    Ct Head Wo Contrast    Result Date: 11/9/2020  EXAMINATION: CT OF THE HEAD WITHOUT CONTRAST  11/9/2020 8:39 am TECHNIQUE: CT of the head was performed without the administration of intravenous contrast. Dose modulation, iterative reconstruction, and/or weight based adjustment of the mA/kV was utilized to reduce the radiation dose to as low as reasonably achievable. COMPARISON: None. HISTORY: ORDERING SYSTEM PROVIDED HISTORY: [facility] TECHNOLOGIST PROVIDED HISTORY: Reason for exam:->[facility] Has a \"code stroke\" or \"stroke alert\" been called? ->No Is the patient pregnant?->No Reason for Exam: headache Acuity: Acute Type of Exam: Initial FINDINGS: BRAIN/VENTRICLES: Evaluation of the vertex is motion degraded. There is no acute intracranial hemorrhage, mass effect or midline shift. No abnormal extra-axial fluid collection.   The gray-white differentiation is maintained without evidence of an acute infarct. There is no evidence of hydrocephalus. Bifrontal volume loss, somewhat greater than expected for age. ORBITS: The visualized portion of the orbits demonstrate no acute abnormality. SINUSES: Mild chronic appearing sphenoid and ethmoid sinus mucosal disease without aggressive features. Immediate paranasal sinuses and mastoid air cells are clear. SOFT TISSUES/SKULL:  No acute abnormality of the visualized skull or soft tissues with motion degraded evaluation at the vertex. .     Evaluation of the parenchyma, skull, and soft tissues at the vertex is motion degraded. Otherwise, no acute intracranial abnormality. Bifrontal volume loss, greater than expected for age. Chronic sphenoid and ethmoid sinus mucosal disease. Ct Lumbar Spine Wo Contrast    Result Date: 11/9/2020  EXAMINATION: CT OF THE LUMBAR SPINE WITHOUT CONTRAST  11/9/2020 TECHNIQUE: CT of the lumbar spine was performed without the administration of intravenous contrast. Multiplanar reformatted images are provided for review. Dose modulation, iterative reconstruction, and/or weight based adjustment of the mA/kV was utilized to reduce the radiation dose to as low as reasonably achievable. COMPARISON: None HISTORY: ORDERING SYSTEM PROVIDED HISTORY: sacral wound Is the patient pregnant?-> no Reason for Exam: sacral wound r/o osteo Acuity: Acute Type of Exam: Initial FINDINGS: BONES/ALIGNMENT: There is normal alignment of the spine. The vertebral body heights are maintained. No osseous destructive lesion is seen. DEGENERATIVE CHANGES: No significant degenerative changes of the lumbar spine. SOFT TISSUES/RETROPERITONEUM: No paraspinal mass is seen. Unremarkable non-contrast CT of the lumbar spine. No discrete sacral bone erosion or destruction evident.  Correlate with CT pelvis for description of soft tissues adjacent to the sacrum which are incompletely included in the field of view on this exam.     Ct Pelvis Wo Contrast Additional Contrast? None    Result Date: 11/10/2020  EXAMINATION: CT OF THE PELVIS WITHOUT CONTRAST 11/9/2020 2:55 pm TECHNIQUE: CT of the pelvis was performed without the administration of intravenous contrast.  Multiplanar reformatted images are provided for review. Dose modulation, iterative reconstruction, and/or weight based adjustment of the mA/kV was utilized to reduce the radiation dose to as low as reasonably achievable. COMPARISON: None. HISTORY: ORDERING SYSTEM PROVIDED HISTORY: r/o osteo, sacral wound TECHNOLOGIST PROVIDED HISTORY: Reason for exam:->r/o osteo, sacral wound Additional Contrast?->None Is the patient pregnant?->No Reason for Exam: r/o osteo, sacral wound Acuity: Acute Type of Exam: Initial FINDINGS: There is generalized subcutaneous edema in the visualized pelvis and upper thighs. There is a deep left posterior soft tissue ulcer which extends within 2 mm of the bone at the proximal coccyx. No discrete bony erosions are seen to suggest acute osteomyelitis. No well-defined fluid collection is seen to suggest an abscess. There is subcutaneous fat stranding posteriorly extending from the skin to the right ischium without a well-defined drainable fluid collection. No soft tissue gas is identified. There is presacral fat stranding. Simple-appearing free fluid is seen along the right paracolic gutter. No acute fracture or evidence of dislocation. Mild bilateral hip joint degenerative changes. Atheromatous calcifications are seen in the abdominal aorta and its branches. There is air in the urinary bladder. There is a 13 x 6 mm hyperdensity in the right bladder base. Deep left posterior soft tissue ulcer extending within 2 mm of the proximal coccyx. No well-defined bony erosions are seen to suggest acute osteomyelitis. There is fat stranding adjacent to the ulcer compatible with cellulitis. Nonspecific prominent presacral fat stranding. Simple-appearing free fluid along the right paracolic gutter. There is fat stranding extending from the skin to the level of the right ischium compatible with cellulitis. Generalized subcutaneous edema about the pelvis and upper thighs. There is a small hyperdensity at the right bladder base. Unclear if this is contrast from a recent CT or a true bladder lesion. This could be followed up with CT or further assessed with a cystoscopy as clinically indicated. There is also air in the bladder. Correlate for recent intervention. Xr Chest Portable    Result Date: 11/9/2020  EXAMINATION: ONE XRAY VIEW OF THE CHEST 11/9/2020 9:17 am COMPARISON: 11/04/2020 HISTORY: ORDERING SYSTEM PROVIDED HISTORY: SOB TECHNOLOGIST PROVIDED HISTORY: Reason for exam:->SOB Reason for Exam: Fever (Arrived by  EMS from [place] rt fever. Recent COVID positive dx. 99.7 oral. ) Acuity: Acute Type of Exam: Initial FINDINGS: Cardiac silhouette is normal in size. Small bilateral pleural effusions and adjacent atelectasis/airspace disease. No evidence for pneumothorax. Vascular port terminates overlying the expected location of the SVC. Small bilateral effusions and bibasilar airspace disease. Scheduled Meds:   Sodium Hypochlorite   Irrigation BID    apixaban  5 mg Oral BID    vitamin C  500 mg Oral Daily    busPIRone  5 mg Oral BID    calcium-vitamin D  1 tablet Oral BID    folic acid  1 mg Oral Daily    ibuprofen  800 mg Oral TID WC    loperamide  2 mg Oral Q12H    melatonin  9 mg Oral Nightly    therapeutic multivitamin-minerals  1 tablet Oral Daily    pantoprazole  40 mg Oral Daily    promethazine  25 mg Oral Q12H    QUEtiapine  25 mg Oral Nightly    vitamin B-12  500 mcg Oral Daily    cefepime  2 g Intravenous Q12H    vancomycin  1,000 mg Intravenous Q12H     Continuous Infusions:   sodium chloride 100 mL/hr at 11/10/20 0826     PRN Meds:. HYDROcodone-acetaminophen      Assessment:  50 y.o. female admitted with   1. Wound of sacral region, initial encounter    2. Sepsis, due to unspecified organism, unspecified whether acute organ dysfunction present ([facility] [address]        Deep stage III sacral pressure ulceration       Plan:  1. Local wound care with Dakins wet-to-dry dressing changes BID; no plans for debridement at this time  2. Diet as tolerated  3. Activity as tolerated, pressure alleviating measures, Q2 hour turning while in bed  4. PRN analgesics and antiemetics--minimizing narcotics as tolerated  5. Management of medical comorbid etiologies per primary team and consulting services    EDUCATION:  Educated patient on plan of care and disease process--all questions answered. Plans discussed with patient and nursing. Reviewed and discussed with Dr. Rimma Giles.       Signed:  TORI Ayala CNP  11/10/2020 2:56 PM     Case discussed with nurse practitioner  We will change to [facility] dressing changes twice daily  Will need further debridement at bedside

## 2020-11-10 NOTE — PROGRESS NOTES
Assessment complete. VSS. Patient resting in bed. Respirations even and easy. Call light in reach. Fall precautions in place. Pt complains of pain at this time pt aware norco not due at this time. Will continue to monitor.

## 2020-11-10 NOTE — PROGRESS NOTES
Pt on optiflow 100 FIO2 60L O2. Pt continually trying to move and remove optiflow. States cannot tolerate. Requiring constant reminding to not remove or touch optiflow.

## 2020-11-10 NOTE — CARE COORDINATION
Patient has wound care consult. Discussed case with nurse Tessie Ma. Surgery was consulted, wound care orders placed to use Dakin's solution wet to dry dressings, BID and prn. No new orders at this time.  GILMAR Glass, RN  [facility]

## 2020-11-10 NOTE — PROGRESS NOTES
40mg IV lasix given and inserted rasheed. Pt continues to pull at Optiflow. One time dose IV ativan given see MAR. Transfer to higher level of care once bed available.

## 2020-11-10 NOTE — PLAN OF CARE
Nutrition Problem #1: Increased nutrient needs  Intervention: Food and/or Nutrient Delivery: Continue Current Diet, Start Oral Nutrition Supplement  Nutritional Goals: PO greater than 50% at meals/supp

## 2020-11-10 NOTE — CONSULTS
Infectious Diseases   Consult Note        Admission Date: 11/9/2020  Hospital Day: Hospital Day: 2   Attending: Jose Frost MD  Date of service: 11/10/20     Reason for admission: Sepsis [facility]) [A41.9]  Sepsis ([facility] [address] [A41.9]    Chief complaint/ Reason for consult: COVID-19 pneumonia, acute respiratory failure with hypoxia    Microbiology:        I have reviewed allavailable micro lab data and cultures    · Blood culture (2/2) - collected on 11/9/2020: negative so far   · Coccygeal wound culture: Greater than 1/9/2020: E. coli, susceptibility pending    SARS-CoV-2, PCR   COVID-19   Collected:  11/04/20 1410    Result status:  Final    Resulting lab:  [address] [place] [address] LAB    Reference range:  Not Detected    Value:  DETECTEDAbnormal       Comment: This test has been authorized by FDA under an   Emergency Use Authorization (EUA). Antibiotics and immunizations:       Current antibiotics: All antibiotics and their doses were reviewed by me    Recent Abx Admin                   cefepime (MAXIPIME) 2 g IVPB minibag (g) 2 g New Bag 11/10/20 1357     2 g New Bag  0221    vancomycin 1000 mg IVPB in 250 mL D5W addavial (mg) 1,000 mg New Bag 11/10/20 0402                  Immunization History: All immunization history was reviewed by me today. There is no immunization history for the selected administration types on file for this patient. Known drug allergies: All allergies were reviewed and updated    Allergies   Allergen Reactions    Acetaminophen Other (See Comments)     Pt states \"I am not supposed to take it because of my liver syndrome\"    Codeine Hives and Itching    Morphine Itching    Penicillins Hives and Swelling     Facial swelling    Morphine And Related Nausea And Vomiting    Ondansetron Hcl Itching and Rash       Social history:     Social History:  All social andepidemiologic history was reviewed and updated by me today as needed.      · Tobacco use:   reports that she has quit smoking. Her smoking use included cigarettes. She smoked 1.00 pack per day. She has never used smokeless tobacco.  · Alcohol use:   reports no history of alcohol use. · Currently lives in: [facility]  ·  reports no history of drug use. Assessment:     The patient is a 50 y.o. old female who  has a past medical history of Adult failure to thrive, Allergic, Anemia, Anxiety, Calculus of kidney, CHF (congestive heart failure) ([address] Chronic neck pain, Copper deficiency, Depression, Hypertension, Pulmonary emboli ([facility] [address] Sciatica, Seizures ([facility] [address] (3/2014), and Vitamin B12 deficiency neuropathy ([facility] [address] with following problems:    · Acute respiratory failure with hypoxia  · Severe COVID-19 pneumonia  · Elevated CRP of 24.5 in setting of COVID-19  · Thrombocytopenia platelet count of 238 in setting of COVID-19  · Coccygeal wound infection with E. coli  · A+ blood group  · Chronic anemia  · History of pulmonary embolism  · Seizure disorder  · Ex smoker    Discussion: This is a critically ill patient with a severe COVID-19 pneumonia and acute respiratory failure with hypoxia. She had tested positive for COVID-19 6 days ago, was hospitalized, was intubated, was extubated couple of days later and was discharged by primary team.  ID service was not involved in her care during her prior admission. She has been admitted with a headache and worsening acute respiratory failure and has quickly deteriorated and is currently on 6 L oxygen via high flow nasal cannula    Chest x-ray from yesterday reviewed. Shows bilateral atypical lung infiltrates. Plan:     Diagnostic Workup:     Agree with checking baseline pro calcitonin level, troponin level, lactate, d-dimer, LDH level   Will order every other day LDH level and d-dimer, which may help with prognostication (Reference: DANN Intern Med. Published online March 13, 2020. doi:10.1001/jamainternmed.2020.0994).   We will also order pro calcitonin and CRP level every other day for now   2 sets of blood cultures from different sites for thoroughness of work-up   Will order patient's blood group type and screen stat for COVID 19 convalescent plasma    Urine Legionella and pneumococcal antigens   Chest x-ray reviewed. Due to exposure risk, will avoid doing CT scan of the chest or 2 view chest x-ray due to limited utility   Repeat a chest x-ray tomorrow   Procalcitonin morning labs         Antimicrobials:     Start patient on IV remdesivir 200 mg loading dose followed 100 mg every 24 hour   We will order 2 units of COVID-19 convalescent plasma to be transfused stat under FDA EUA, after obtaining consent from the family/patient   Start IV Decadron 20 mg IV daily   Maintain good glycemic control while on Decadron and remdesivir   Low suspicion for secondary bacterial process. We will stop IV vancomycin and IV cefepime   Remdesivir Edilson Wolf) is the first and only treatment currently approved by FDA for treatment of COVID-19. (https://www.Preview Networks/). It is indicated for use in adult and pediatric patients of 15years of age of older and weighing at least 40 kilograms (about 88 pounds) for the treatment of COVID-19 requiring hospitalization. Yomaira Matt should only be administered in a hospital or in a healthcare setting capable of providing acute care comparable to inpatient hospital care.  In hospitalized patients with COVID-19 who require supplemental Oxygen, [facility] recommends Remdesivir 200 mg intravenously (IV) for 1 day, followed by remdesivir 100 mg IV for 4 days or until hospital discharge, whichever comes first (AI); or a combination of remdesivir (dose and duration as above) plus dexamethasone 6 mg IV or orally for up to 10 days or until hospital discharge (BIII); or if remdesivir cannot be used, use of dexamethasone instead (BIII).     NIH recommends that Remdesivir therapy may be extended to up to 10 days if no substantial clinical improvement is seen at Day 5. (TaxiBeat.co.uk)   The combination of dexamethasone and remdesivir has not been studied in clinical trials. There are theoretical reasons for coadministering these drugs according to the NIH Panel.  Supportive care is still the cornerstone of treatment for COVID 19 infection. Except remdesivir, currently there are no other fully FDA approved treatments for COVID 19 infection. The recommendations below are based on frequently changing guidelines from [facility] and IDSA and limited published data available so far, and some of those may change again in coming days.  The RECOVERY (Randomised Evaluation of COVid-19 thERapY) trial conducted in [place] showed that dexamethasone reduced deaths by one-third in patients receiving invasive mechanical ventilation, by one-fifth in patients receiving oxygen without invasive mechanical ventilation, but did not reduce mortality in patients not receiving respiratory support at randomization. Dexamethasone was given up to 10 days in this trial (https://chris.org/. org/doi/full/10.1056/LCNXcc8113850). The NIH COVID-19 Treatment Guidelines Panel recommends using dexamethasone (at a dose of 6 mg per day for up to 10 days) for the treatment of COVID-19 in patients who are mechanically ventilated (AI) and in patients who require supplemental oxygen but who are not mechanically ventilated (BI), but recommends against using dexamethasone for the treatment of COVID-19 in patients who do not require supplemental oxygen.     If dexamethasone is not available, the NIH Panel recommends using alternative glucocorticoids such as prednisone, methylprednisolone, or hydrocortisone (AIII)   FDA has issued emergency use authorization for COVID 19 convalescent plasma for treatment of hospitalized patients with COVID 19 on August 23, 2020 (http://FST Life Sciences.com/). There is still insufficient data for the NIH panel to recommend either for or against its use. It should not be considered standard of care for the treatment of patients with COVID-19 according to the Rookopli 96 19 treatment panel.  Interleukin-6 inhibitor, Tocilizumab (Actemra) was previously proposed in severely ill patients, particularly those with very high interleukin-6 levels. However, NIH COVID-19 treatment  panel has determined that there is insufficient data to recommend either for or against the use of interleukin 1 inhibitors, interleukin-6 inhibitors and interferon beta for treatment of COVID 19 (CreditClassification.com.pt). Recently released update on the phase 3 COVACTA trial, which is the first global, randomised, double-blind, placebo-controlled phase III trial investigating Actemra/RoActemra in hospitalized patients with severe COVID 19 respiratory pneumonia did not meet its primary endpoint of improved clinical status in patients with COVID-19 associated pneumonia, or the key secondary endpoint of reduced patient mortality. ([employer]imus [employer] Worldwide.InRiver.cy)   FDA has withdrawn its EUA for hydroxychloroquine as a treatment modality for COVID 19 on Jewell 15, 2020. In 2 small, uncontrolled studies conducted in [place] and [place], hydroxychloroquine and its congener, chloroquine were previously reported to be effective against COVID-19. However, International Society of Antimicrobial Chemotherapy subsequently declared that the trial did not meet the Societys expected standard.  (DANN Netw Open. 2020;3(4):u724380. EDN:19.5842/UGXHPTQKFXRMJRW.5592.2879).  A large recently published observational study in [facility] did not show any benefit of hydroxychloroquine in reducing intubation rates or mortality in COVID 19 patients (N Engl J Med. 2020 May 7. doi: 10.1056/NNINsc3751553). A large randomized controlled trial called the \"RECOVERY trial\"  conducted in the [facility] has not shown hydroxychloroquine to be beneficial in hospitalized COVID 19 patients. Although, one retrospective study published in IJID indicated potential benefit of hydroxychloroquine and hydroxychloroquine plus azithromycin combination   https://CleverSet/) , it had several flaws including retrospective, nonrandomized design, concomitant use of steroids in most patients and exclusion of 10% of patients were still hospitalized at the time the study was ended, potentially skewing the results.  In a recently published study, hydroxychloroquine has also failed to show benefit as a postexposure prophylaxis for COVID 19 within 4 days after exposure (https://shea.org/. org/doi/full/10.1056/BSBZjh4701082)   The NIH COVID-19 Treatment Guidelines Panel now recommends against using hydroxychloroquine plus azithromycin for the treatment of COVID-19, except in a clinical trial (AIII) (H[employer] the [employer] Partners.co.uk). The Panel recommends against the use of chloroquine or hydroxychloroquine for the treatment of COVID-19, except in a clinical trial (AII). The Panel recommends against the use of high-dose chloroquine (600 mg twice daily for 10 days) for the treatment of COVID-19 (AI).  Coadministration of Remdesvir and chloroquine phosphate or hydroxychloroquine sulfate is not recommended based on in vitro data demonstrating an antagonistic effect of chloroquine on the intracellular metabolic activation and antiviral activity of Remdesevir. (Reference: Remdesevir package insert)  · Lopinavir/ritonavir trial has failed to show benefits in the recently published trial in [place] (Reference:N Engl J Med. 2020 Mar 18. doi: 10.1056/NJSKnl0869070). However, this trial was under-powered.  Except in the context of a clinical trial, the COVID-19 Treatment Guidelines Panel recommends against using lopinavir/ritonavir (AI) or other HIV protease inhibitors (AIII) for the treatment of COVID-19. (https://pollock-divina.com/  · The NIH panel recommends against the use of Interferons (hernan or beta), Bruton's tyrosine kinase inhibitors and Janus kinase inhibitors (AIII) for the treatment of COVID-19, except in a clinical trial (IndyGeek.[place]60.com.pt). The Panel recommends against the use of mesenchymal stem cells for the treatment of COVID-19, except in a clinical trial (AII).  Recommendations for routine VTE prophylaxis are the same for hospitalized pregnant and nonpregnant patients (AIII).  There is insufficient data about use of vitamin C, vitamin D, zinc in COVID 19. NIH guidelines recommend against use of statins, ACE inhibitor/ARB's solely for the purpose of treating COVID 19, if not otherwise clinically indicated for other medical reasons. IDSA panel suggests against famotidine use for the sole purpose of treating COVID-19 in hospitalized patients, outside of the context of a clinical trial .    Continue strict droplet plus isolation currently being used for COVID-19 infections.  Recommend close vitals monitoring.  Continue oxygen support to try to maintain oxygen saturation above 92%   Fall and aspiration precautions.  Discussed above plan with RN    Continue close monitoring in COVID 19 unit. Drug Monitoring:    · Continue serial monitoring for antibiotic toxicity as follows: CBC, CMP   · Continue to watch for following: new or worsening fever, hypotension, hives, lip swelling and redness or purulence at vascular access sites. I/v access Management:    · Continue to monitor i.v access sites for erythema, induration, discharge or tenderness.    · As always, continue efforts to minimizetubes/lines/drains as clinically appropriate to reduce chances of line associated infections. Current isolation precautions:    Currently active isolation(s): Droplet Plus       Risk of Complications/Morbidity/Mortality: High     · Patient is critically ill and has a potentially life threatening infection that poses threat to life/bodily function. · There is potential for worsening infection/ sudden clinically significant or life-threatening deterioration in the patient's condition without appropriate antimicrobial therapy. · Complex medical decision making process was involved to select appropriate antimicrobial agents to reverse the cause of patient's severe infection/ illness. · Antimicrobial therapy requires intensive monitoring for toxicity and frequent dose adjustments to prevent toxicity and permanent end-organ dysfunction. Critical care time: 32 minutes    Thank you for involving me in the care of your patient. I will continue to follow. If you have any additional questions, please do not hesitate to contact me. Subjective:     Presenting complaint in ER:     Chief Complaint   Patient presents with    Fever     Arrived by [facility] from [place] fever. Recent COVID positive dx.  99.7 oral.          HPI: Sal Blakely is a 50 y.o. female patient, who was seen at the request of Dr. Karuna Anne MD.    History was obtained from chart review as patient is acutely ill and is on 60 L oxygen via high flow nasal cannula    The patient was admitted on 11/9/2020. I have been consulted to see the patient for above mentioned reason(s). The patient has multiple medical comorbidities, and presented to the ER from [address] [place] [address] for fever. The patient had presented to the ER 5 days ago and had tested positive for COVID-19. She required intubation. The patient was subsequently extubated and was apparently discharged on 11/6/2020.     She developed atypical headache and started having fevers at [address] and was sent back to the ER earlier yesterday. She was on 2 L oxygen via nasal cannula. Her oxygen status has worsened very quickly and she is now requiring 6 L oxygen via heated high flow nasal cannula. I have been asked for my opinion for management for this patient. Past Medical History: All past medical history reviewed today. Past Medical History:   Diagnosis Date    Adult failure to thrive     Allergic     Anemia     related to gastric bypass, followed by Dr. Lavon Cruz    Anxiety     Calculus of kidney     CHF (congestive heart failure) ([address]     Chronic neck pain     Copper deficiency     Depression     Hypertension     Pulmonary emboli (HCC)     Sciatica     Seizures ([address] 3/2014    hypoglycemia and/or benzo withdrawal    Vitamin B12 deficiency neuropathy ([facility] [address]          Past Surgical History: All pastsurgical history was reviewed today. Past Surgical History:   Procedure Laterality Date    ABDOMINOPLASTY      CHOLECYSTECTOMY      GASTRIC BYPASS SURGERY      TUNNELED VENOUS PORT PLACEMENT Right 5/2/2014    Dr. Justyna Lucero         Family History: All family history was reviewed today. Problem Relation Age of Onset    Dementia Mother     High Blood Pressure Mother     Thyroid Cancer Mother     Heart Disease Father     Stroke Father     Heart Attack Father     Thyroid Cancer Maternal Grandmother     High Blood Pressure Maternal Grandmother     Heart Disease Maternal Grandmother     Lung Cancer Paternal Grandfather          Medications: All current and past medications were reviewed. Medications Prior to Admission: apixaban (ELIQUIS) 5 MG TABS tablet, Take 5 mg by mouth 2 times daily  buprenorphine-naloxone (SUBOXONE) 8-2 MG FILM SL film, Place 1 Film under the tongue every morning. buprenorphine-naloxone (SUBOXONE) 2-0.5 MG FILM SL film, Place 1 Film under the tongue every evening.   Melatonin 10 MG TABS,  140   K 3.6 3.7   CL 97* 98*   CO2 37* 36*   BUN 6* 7   CREATININE <0.5* <0.5*   CALCIUM 8.2* 8.4   GLUCOSE 88 163*        Hepatic FunctionPanel:   Lab Results   Component Value Date    ALKPHOS 97 11/10/2020    ALT 31 11/10/2020    AST 22 11/10/2020    PROT 4.9 11/10/2020    BILITOT <0.2 11/10/2020    LABALBU 2.5 11/10/2020       CPK:   Lab Results   Component Value Date    CKTOTAL 25 (L) 11/04/2020     ESR: No results found for: SEDRATE  CRP:   Lab Results   Component Value Date    CRP 24.5 (H) 11/09/2020         Imaging: All pertinent images and reports for the current visit were reviewed by meduring this visit. CT LUMBAR SPINE WO CONTRAST   Final Result   Unremarkable non-contrast CT of the lumbar spine. No discrete sacral bone erosion or destruction evident. Correlate with CT pelvis for description of soft tissues adjacent to the   sacrum which are incompletely included in the field of view on this exam.         CT PELVIS WO CONTRAST Additional Contrast? None   Final Result   Deep left posterior soft tissue ulcer extending within 2 mm of the proximal   coccyx. No well-defined bony erosions are seen to suggest acute   osteomyelitis. There is fat stranding adjacent to the ulcer compatible with   cellulitis. Nonspecific prominent presacral fat stranding. Simple-appearing free fluid   along the right paracolic gutter. There is fat stranding extending from the skin to the level of the right   ischium compatible with cellulitis. Generalized subcutaneous edema about the pelvis and upper thighs. There is a small hyperdensity at the right bladder base. Unclear if this is   contrast from a recent CT or a true bladder lesion. This could be followed   up with CT or further assessed with a cystoscopy as clinically indicated. There is also air in the bladder. Correlate for recent intervention.          CT HEAD WO CONTRAST   Final Result   Evaluation of the parenchyma, skull, Thursday

## 2020-11-10 NOTE — ED NOTES
[place] ([facility]) sandwich provided.        Celso Gerber RN  11/09/20 1941

## 2020-11-10 NOTE — PROGRESS NOTES
Patient arrived on 5T. VSS. Oriented to call light. Repositioned patient. Admission completed. All scheduled meds given. C/o 10/10 back and buttocks pain. PRN Norco ordered and given. Call light in reach.

## 2020-11-10 NOTE — PROGRESS NOTES
at 11/10/2020 0844  Gross per 24 hour   Intake 370 ml   Output --   Net 370 ml      Wt Readings from Last 3 Encounters:   11/09/20 158 lb (71.7 kg)   11/06/20 158 lb 11.7 oz (72 kg)   09/22/20 132 lb 8 oz (60.1 kg)       General appearance:  Appears comfortable  Eyes: Sclera clear. Pupils equal.  ENT: Moist oral mucosa. Trachea midline, no adenopathy. Cardiovascular: Regular rhythm, normal S1, S2. No murmur. No edema in lower extremities  Respiratory: respriatroy distress  auscultation bilaterally, no wheeze or crackles. GI: Abdomen soft, no tenderness, not distended, normal bowel sounds  Musculoskeletal: No cyanosis in digits, neck supple  Neurology: CN 2-12 grossly intact. No speech or motor deficits  Psych: Normal affect. Alert and oriented in time, place and person  Skin: Warm, dry, normal turgor    Labs and Tests:  CBC:   Recent Labs     11/09/20  1038 11/10/20  0437   WBC 3.2* 4.6   HGB 10.1* 10.1*    126*     BMP:    Recent Labs     11/09/20  1038 11/10/20  0437    140   K 3.6 3.7   CL 97* 98*   CO2 37* 36*   BUN 6* 7   CREATININE <0.5* <0.5*   GLUCOSE 88 163*     Hepatic:   Recent Labs     11/09/20  1038 11/10/20  0437   AST 41* 22   ALT 42* 31   BILITOT 0.4 <0.2   ALKPHOS 105 97       Discussed care with family and patient             Spent 35  Minutes CC time  with patient and family at bedside and on unit reviewing medical records and labs, spent greater than 50% time counseling patient and family on diagnosis and plan   Problem List  Active Problems:    Vitamin B12 deficiency    Intestinal malabsorption following gastrectomy    Pulmonary embolus (HCC)    Enterococcus UTI    Functional quadriplegia ([address]    Sepsis ([address]    Sacral wound  Resolved Problems:    * No resolved hospital problems. *       Assessment & Plan:   1. Acute hypoxic respiratory failure   -secondary to COVID 19 infection with underlying wound infection  - pt was on 6l NC on presentation now on 60l, optiflow.   - initiate

## 2020-11-11 NOTE — PROGRESS NOTES
PULMONARY AND CRITICAL CARE MEDICINE PROGRESS NOTE    CONSULTING PHYSICIAN: Marco A Hua MD    ADMISSION DATE: 11/9/2020  ADMISSION DIAGNOSIS: Sepsis ([facility] [address] [A41.9]  Sepsis ([facility] [address] [A41.9]    REASON FOR CONSULT:   Chief Complaint   Patient presents with    Fever     Arrived by [facility] from [place] rt fever. Recent COVID positive dx.  99.7 oral.         DATE OF CONSULT: 11/9/2020    INTERVAL HISTORY: Patient remains critically ill but making slow progress. Overnight remained on BiPAP therapy for persistent hypoxic respiratory failure and respiratory distress. Oxygen requirements have come down. Hypercapnia has resolved. Remains on Precedex gtt. to maintain synchrony with the noninvasive ventilator. Seem to have infected decubitus ulcer in the sacrococcygeal area. REVIEW OF SYSTEMS:     Unable to obtain as patient is currently sedated.     PAST MEDICAL HISTORY:   Past Medical History:   Diagnosis Date    Adult failure to thrive     Allergic     Anemia     related to gastric bypass, followed by Dr. Natacha Solis Calculus of kidney     CHF (congestive heart failure) ([facility] [address]     Chronic neck pain     Copper deficiency     Depression     Hypertension     Pulmonary emboli (HCC)     Sciatica     Seizures ([address] 3/2014    hypoglycemia and/or benzo withdrawal    Vitamin B12 deficiency neuropathy ([facility] [address]        PAST SURGICAL HISTORY:   Past Surgical History:   Procedure Laterality Date    ABDOMINOPLASTY      CHOLECYSTECTOMY      GASTRIC BYPASS SURGERY      TUNNELED VENOUS PORT PLACEMENT Right 5/2/2014    Dr. Anthony Valdovinos HISTORY:   Social History     Tobacco Use    Smoking status: Former Smoker     Packs/day: 1.00     Types: Cigarettes    Smokeless tobacco: Never Used   Substance Use Topics    Alcohol use: No    Drug use: No       FAMILY HISTORY:   Family History   Problem Relation Age of Onset    Dementia Mother     High Blood Pressure Mother     Thyroid Cancer Mother  Heart Disease Father     Stroke Father     Heart Attack Father     Thyroid Cancer Maternal Grandmother     High Blood Pressure Maternal Grandmother     Heart Disease Maternal Grandmother     Lung Cancer Paternal Grandfather        MEDICATIONS:     No current facility-administered medications on file prior to encounter. Current Outpatient Medications on File Prior to Encounter   Medication Sig Dispense Refill    apixaban (ELIQUIS) 5 MG TABS tablet Take 5 mg by mouth 2 times daily      buprenorphine-naloxone (SUBOXONE) 8-2 MG FILM SL film Place 1 Film under the tongue every morning.  buprenorphine-naloxone (SUBOXONE) 2-0.5 MG FILM SL film Place 1 Film under the tongue every evening.       Melatonin 10 MG TABS Take 10 mg by mouth nightly as needed      QUEtiapine (SEROQUEL) 50 MG tablet Take 0.5 tablets by mouth nightly 60 tablet 3    folic acid (FOLVITE) 1 MG tablet Take 1 tablet by mouth daily 30 tablet 3    vitamin B-12 (CYANOCOBALAMIN) 500 MCG tablet Take 500 mcg by mouth daily      promethazine (PHENERGAN) 25 MG tablet Take 25 mg by mouth every 12 hours      loperamide (IMODIUM) 2 MG capsule Take 2 mg by mouth every 12 hours      ibuprofen (ADVIL;MOTRIN) 800 MG tablet Take 800 mg by mouth every 12 hours as needed for Pain       pantoprazole (PROTONIX) 40 MG tablet Take 40 mg by mouth daily      busPIRone (BUSPAR) 5 MG tablet Take 5 mg by mouth 2 times daily       Calcium Carb-Cholecalciferol (CALCIUM 600-D PO) Take 1 tablet by mouth 2 times daily       Multiple Vitamins-Minerals (THERAPEUTIC MULTIVITAMIN-MINERALS) tablet Take 1 tablet by mouth daily      Ascorbic Acid (VITAMIN C) 500 MG CAPS Take 500 mg by mouth daily 30 capsule 3        lidocaine-EPINEPHrine  20 mL Intradermal Once    cefTRIAXone (ROCEPHIN) IV  1 g Intravenous Q24H    metroNIDAZOLE  500 mg Intravenous Q8H    Sodium Hypochlorite   Irrigation BID    sodium chloride  20 mL Intravenous Once    dexamethasone (DECADRON) IVPB  20 mg Intravenous Daily    remdesivir IVPB  100 mg Intravenous Q24H    furosemide  40 mg Intravenous Once    sodium chloride  20 mL Intravenous Once    apixaban  5 mg Oral BID    vitamin C  500 mg Oral Daily    busPIRone  5 mg Oral BID    calcium-vitamin D  1 tablet Oral BID    folic acid  1 mg Oral Daily    ibuprofen  800 mg Oral TID WC    loperamide  2 mg Oral Q12H    therapeutic multivitamin-minerals  1 tablet Oral Daily    pantoprazole  40 mg Oral Daily    promethazine  25 mg Oral Q12H    QUEtiapine  25 mg Oral Nightly    vitamin B-12  500 mcg Oral Daily      dexmedetomidine 0.3 mcg/kg/hr (11/11/20 0548)     guaiFENesin-dextromethorphan, LORazepam, HYDROcodone-acetaminophen      ALLERGIES:   Allergies as of 11/09/2020 - Review Complete 11/09/2020   Allergen Reaction Noted    Acetaminophen Other (See Comments) 03/08/2016    Codeine Hives and Itching 06/16/2013    Morphine Itching 03/22/2010    Penicillins Hives and Swelling 03/22/2010    Morphine and related Nausea And Vomiting 05/12/2015    Ondansetron hcl Itching and Rash 08/20/2014      OBJECTIVE:   height is 5' 7\" (1.702 m) and weight is 153 lb 3.2 oz (69.5 kg). Her temporal temperature is 96.2 °F (35.7 °C). Her blood pressure is 154/90 (abnormal) and her pulse is 56. Her respiration is 22 and oxygen saturation is 94%. No intake/output data recorded. PHYSICAL EXAM:          CONSTITUTIONAL: She is a 50y.o.-year-old who appears her stated age. She is sedated with Precedex gtt. HEENT: PERRLA, EOMI. No scleral icterus. No thrush, atraumatic, normocephalic. NECK: Supple, without cervical or supraclavicular lymphadenopathy:  CARDIOVASCULAR: Deferred due to full PPE precautions  RESPIRATORY & CHEST: Deferred due to full PPE precautions  GASTROINTESTINAL & ABDOMEN: Soft, nontender,  non-distended, without hepatosplenomegaly. GENITOURINARY: Deferred. MUSCULOSKELETAL: No tenderness to palpation of the axial skeleton. There is no clubbing. No cyanosis. No edema of the lower extremities. SKIN OF BODY: No rash or jaundice. PSYCHIATRIC EVALUATION: Unable to assess  HEMATOLOGIC/LYMPHATIC/ IMMUNOLOGIC: No palpable lymphadenopathy. NEUROLOGIC: Sedated and on BiPAP therapy. LABS:  Recent Labs     11/09/20  1038 11/10/20  0437 11/11/20  0835   WBC 3.2* 4.6 3.8*   HGB 10.1* 10.1* 10.2*   HCT 32.8* 33.0* 33.4*    126* 158   ALT 42* 31 27   AST 41* 22 21    140 147*   K 3.6 3.7 3.6   CL 97* 98* 99   CREATININE <0.5* <0.5* <0.5*   BUN 6* 7 6*   CO2 37* 36* 45*   INR 1.21*  --   --        Recent Labs     11/09/20  1038 11/10/20  0437 11/11/20  0835   GLUCOSE 88 163* 110*   CALCIUM 8.2* 8.4 8.7    140 147*   K 3.6 3.7 3.6   CO2 37* 36* 45*   CL 97* 98* 99   BUN 6* 7 6*   CREATININE <0.5* <0.5* <0.5*       Recent Labs     11/10/20  2236 11/11/20  0551   PHART 7.323* 7.440   UBI3ZGV 91.4* 68.2*   PO2ART 138.0* 67.7*   WCC8LDH 47.4* 46.3*   O3HLSMGQ 99.4 95.2   BEART 17.9* 19.2*       Lab Results   Component Value Date    INR 1.21 (H) 11/09/2020    INR 1.60 (H) 11/04/2020    INR 0.97 09/18/2020    PROTIME 14.1 (H) 11/09/2020    PROTIME 18.6 (H) 11/04/2020    PROTIME 11.2 09/18/2020     No results found for: AMYLASE   No results found for: LABA1C  No results found for: EAG  Lab Results   Component Value Date    TSH 1.14 09/20/2020     Lab Results   Component Value Date    CKTOTAL 25 (L) 11/04/2020    TROPONINI <0.01 11/09/2020      Lab Results   Component Value Date    CRP 22.8 (H) 11/11/2020      No results found for: BNP   Lab Results   Component Value Date    DDIMER 218 11/11/2020      Lab Results   Component Value Date    FERRITIN 160.6 02/02/2017      Lab Results   Component Value Date    LACTA 0.5 09/18/2020           IMAGING:    Chest x-ray portable 1 view done on 11/9/2020 was personally viewed by me and my interpretation is: Bibasilar atelectasis seen. No clear pulmonary infiltrates are visible.   No pneumothorax, pleural effusion seen. Cardiac silhouette is within normal limits. IMPRESSION:     1. Acute hypoxic and hypercapnic respiratory failure, slowly improving  2. Recent diagnosis of COVID-19  3. Functional quadriplegia  4. Opioid dependence  5. Anxiety/depression  6. Heart failure with preserved ejection fraction in mild exacerbation  7. Recent history of pulmonary embolism now on anticoagulation  8. Extensive decubitus ulcer    RECOMMENDATION:     1. Patient has presented to the hospital with generalized fatigue and found to have acute on chronic hypoxic and hypercapnic respiratory failure. 2. Hypoxic respiratory failure is most likely multifactorial with Covid infection, heart failure with preserved ejection fraction and exacerbation and bibasilar atelectasis contributing. 3. Hypercapnic respiratory failure possibly due to opioids. 4. Overnight was on BiPAP therapy. Initially started on FiO2 of 100% which is now been weaned down to 40%. Patient saturating 98 200% on the monitor. 5. We will try to transition the patient to high flow nasal cannula this afternoon. 6. General surgery to perform incision and drainage of the sacral decubitus ulcer. 7. In the meanwhile we will start the patient on Rocephin and Flagyl to cover for E. coli isolation from the wound culture. 8. Patient to continue on Eliquis which will protect from recurrence of pulmonary embolism. 9. Patient to continue on Precedex gtt. for now. 10. She has been started on IV Decadron 20 mg, IV Remdesivir and has been given 2 units of convalescent plasma. Total critical care time caring for this patient with life threatening illness, including direct patient contact, management of life support systems, review of data including imaging and labs, discussions with other team members and physicians is at least 32 minutes so far today, excluding procedures.         Julius Gracia MD   Pulmonary Critical Care and Sleep [address], [place], [address]  11/9/2020, 2:54 PM            Cleophus Leyden, MD  Pulmonary Critical Care and Sleep Medicine  11/9/2020, 2:50 PM    This note was completed using dragon medical speech recognition software. Grammatical errors, random word insertions, pronoun errors and incomplete sentences are occasional consequences of this technology due to software limitations. If there are questions or concerns about the content of this note of information contained within the body of this dictation they should be addressed with the provider for clarification.

## 2020-11-11 NOTE — PROGRESS NOTES
Patient agitated and restless. Precedex started. See MAR for titrations. Morning labs drawn and CHG bath given. New bed pad, gown, EKG tabs, draw sheet, and warm blanket.

## 2020-11-11 NOTE — PROGRESS NOTES
Infectious Diseases   Progress Note      Admission Date: 11/9/2020  Hospital Day: Hospital Day: 3   Attending: May Peabody, MD  Date of service: 11/11/2020     Chief complaint/ Reason for consult:     · Acute respiratory failure with hypoxia  · Severe COVID-19 pneumonia  · Elevated CRP of 24.5 in setting of COVID-19  · Thrombocytopenia platelet count of 169 in setting of COVID-19  · Coccygeal wound infection with E. coli    Microbiology:        I have reviewed allavailable micro lab data and cultures    · Blood culture (2/2) - collected on 11/9/2020:  Negative so far   · Urine Legionella and pneumococcal antigens- collected on 11/9/2020: Negative  · COVID-19 PCR test :    SARS-CoV-2, PCR   COVID-19   Collected:  11/04/20 1410    Result status:  Final    Resulting lab:  [address] [place] [address] LAB    Reference range:  Not Detected    Value:  DETECTEDAbnormal       Comment: This test has been authorized by FDA under an   Emergency Use Authorization (EUA). · Coccygeal wound culture: Heavy growth of E.  Coli    Escherichia coli (1)     Antibiotic  Interpretation  KRZYSZTOF  Status     ampicillin  Resistant  >=32  mcg/mL      ceFAZolin  Sensitive  <=4  mcg/mL      cefepime  Sensitive  <=0.12  mcg/mL      cefTRIAXone  Sensitive  <=0.25  mcg/mL      ciprofloxacin  Resistant  >=4  mcg/mL      ertapenem  Sensitive  <=0.12  mcg/mL      gentamicin  Sensitive  <=1  mcg/mL      levofloxacin  Resistant  >=8  mcg/mL      piperacillin-tazobactam  Sensitive  <=4  mcg/mL      trimethoprim-sulfamethoxazole  Resistant  >=320  mcg/mL              Antibiotics and immunizations:       Current antibiotics: All antibiotics and their doses were reviewed by me    Recent Abx Admin                   remdesivir 100 mg in sodium chloride 0.9 % 250 mL IVPB (mg) 100 mg New Bag 11/11/20 1644    metronidazole (FLAGYL) 500 mg in NaCl 100 mL IVPB premix (mg) 500 mg New Bag 11/11/20 1307    cefTRIAXone (ROCEPHIN) 1 g in sterile water 10 mL IV syringe (g) 1 g Given 11/11/20 1306    remdesivir 200 mg in sodium chloride 0.9 % 250 mL IVPB (mg) 200 mg New Bag 11/10/20 2612                  Immunization History: All immunization history was reviewed by me today. There is no immunization history for the selected administration types on file for this patient. Known drug allergies: All allergies were reviewed and updated    Allergies   Allergen Reactions    Acetaminophen Other (See Comments)     Pt states \"I am not supposed to take it because of my liver syndrome\"    Codeine Hives and Itching    Morphine Itching    Penicillins Hives and Swelling     Facial swelling    Morphine And Related Nausea And Vomiting    Ondansetron Hcl Itching and Rash       Social history:     Social History:  All social andepidemiologic history was reviewed and updated by me today as needed. · Tobacco use:   reports that she has quit smoking. Her smoking use included cigarettes. She smoked 1.00 pack per day. She has never used smokeless tobacco.  · Alcohol use:   reports no history of alcohol use. · Currently lives in: [address] [place] [address] Dr Sharma  reports no history of drug use. Assessment:     The patient is a 50 y.o. old female who  has a past medical history of Adult failure to thrive, Allergic, Anemia, Anxiety, Calculus of kidney, CHF (congestive heart failure) ([address] Chronic neck pain, Copper deficiency, Depression, Hypertension, Pulmonary emboli ([address] Sciatica, Seizures ([address] (3/2014), and Vitamin B12 deficiency neuropathy ([address]  with following problems:    · Acute respiratory failure with hypoxia  · Severe COVID-19 pneumonia  · Elevated CRP of 24.5 in setting of COVID-19  · Thrombocytopenia platelet count of 738 in setting of COVID-19  · Coccygeal wound infection with E. coli  · A+ blood group  · Chronic anemia  · History of pulmonary embolism  · Seizure disorder  · Ex smoker      Discussion:      I had started the patient on IV remdesivir yesterday and it ordered 2 units of COVID-19 convalescent plasma as well yesterday, which she has received. She is currently on BiPAP at 40% FiO2. Serum creatinine 0.5. Procalcitonin was low at 0.06    CRP is 22.8, LDH is 365. Liver enzymes are okay. White cell count is 3800. D-dimer is 218. Urine Legionella and pneumococcal antigens are negative. Arterial blood gas shows arterial pH of 7.44 with PCO2 67.7, PCO2 68.2 and bicarb of 46.3 today. Blood group is A+. Plan:     Diagnostic Workup:    · Continue to monitor LDH, D-dimer, CRP, procalcitonin every other day  · Continue to follow  fever curve, WBC count and blood cultures  · Follow up on blood counts, liver and renal function  · Continue to watch for hyperglycemia, transaminitis, renal dysfunction, anemia, constipation issues while on remdesivir. Antimicrobials:    · Will continue IV remdesivir 100 mg every 24 hours  · Procalcitonin is low. No role of empiric antibiotics for pneumonia. However, given the growth of E. coli from the coccygeal wound culture, agree with starting IV ceftriaxone. Will increase dose to 2 g every 24 hours  · If wound culture remains negative for anaerobes, can plan to stop IV Flagyl in coming days. · Patient is on Eliquis chronically. · Continue Decadron daily at a dose of 20 mg daily  · Encourage frequent change in postures and awake prone positioning as tolerated. · Cough and deep breathing exercises. · Anticoagulation per primary and pulmonary. · Continue to monitor his vitals closely. · Continue supplemental oxygen/high flow/BiPAP as needed to oxygen saturation above 92%. · Maintain good glycemic control. · Continue to watch for new or worsening fever or productive cough or other signs of secondary bacterial infection. · Fall and aspiration precautions. · COVID-19 isolation precautions. · Pressure ulcer care and prevention precautions  · The patient is critically ill.   Continue close monitoring in COVID-19 ICU  · Discussed all above with RN       Drug Monitoring:    · Continue monitoring for antimicrobial agent toxicity as follows: CBC, CMP  · Continue to watch for following: new or worsening fever, new hypotension, hives, lip swelling and redness or purulence at vascular access sites. I/v access Management:    · Continue to monitor i.v access sites for erythema, induration, discharge or tenderness. · As always, continue efforts to minimize tubes/lines/drains as clinically appropriate to reduce chances of line associated infections. Risk of Complications/Morbidity/Mortality: High     · Patient is critically ill and has a potentially life threatening infection that poses threat to life/bodily function. · There is potential for worsening infection/ sudden clinically significant or life-threatening deterioration in the patient's condition without appropriate antimicrobial therapy. · Complex medical decision making process was involved to select appropriate antimicrobial agents to reverse the cause of patient's severe infection/ illness. · Antimicrobial therapy requires intensive monitoring for toxicity and frequent dose adjustments to prevent toxicity and permanent end-organ dysfunction. Critical care time: 31 minutes      Thank you for involving me in the care of your patient. I will continue to follow. If you have anyadditional questions, please do not hesitate to contact me. Subjective: Interval history: Interval history was obtained from chart review and RN. The patient remains quite sick. She has received 2 units of COVID-19 #plasma yesterday. Today is day 2 of IV remdesivir. She remains on BiPAP. REVIEW OF SYSTEMS:      Review of Systems   Unable to perform ROS: Acuity of condition         Past Medical History: All past medical history reviewed today.     Past Medical History:   Diagnosis Date    Adult failure to thrive     Allergic     Anemia     related to gastric bypass, followed by Dr. Modesto Goodman Calculus of kidney     [facility]) [facility]     Chronic neck pain     Copper deficiency     Depression     Hypertension     Pulmonary emboli (HCC)     Sciatica     Seizures ([facility] 3/2014    hypoglycemia and/or benzo withdrawal    Vitamin B12 deficiency neuropathy ([facility]        Past Surgical History: All past surgical history was reviewed today. Past Surgical History:   Procedure Laterality Date    ABDOMINOPLASTY      CHOLECYSTECTOMY      GASTRIC BYPASS SURGERY      TUNNELED VENOUS PORT PLACEMENT Right 5/2/2014    Dr. Shelly Spicer       Family History: All family history was reviewed today. Problem Relation Age of Onset    Dementia Mother     High Blood Pressure Mother     Thyroid Cancer Mother     Heart Disease Father     Stroke Father     Heart Attack Father     Thyroid Cancer Maternal Grandmother     High Blood Pressure Maternal Grandmother     Heart Disease Maternal Grandmother     Lung Cancer Paternal Grandfather        Objective:       PHYSICAL EXAM:      Vitals:   Vitals:    11/11/20 0848 11/11/20 1000 11/11/20 1200 11/11/20 1253   BP:  (!) 162/83 (!) 154/90    Pulse:  55 56    Resp: 20 20 21 22   Temp:  96.6 °F (35.9 °C) 96.2 °F (35.7 °C)    TempSrc:  Temporal Temporal    SpO2:  92% 99% 94%   Weight:       Height:           Physical Exam       PHYSICAL EXAM:     In-person bedside physical examination deferred. Pursuant to the emergency declaration under the [facility] authority and the [employer] Act, this clinical encounter was conducted to provide necessary medical care.    (Also consistent with new provisions and guidance offered by [facility] on March 18, 2020 in setting of COVID 19 outbreak and in order to preserve personal protective equipment in accordance with the flexibilities announced by CMS on March 30, 2020)   References: [email or web address]/Resources/COVID-19/3_18%20Telemed%20Guidance%20Updated%20March%2018. pdf?[email or web address]                      [email or web address]/COVID-19/3_18%20Telemed%20Guidance%20Updated%20March%2018. pdf?pdq=[email or web address]                      [email or web address] pdf                            General: Awake and oriented to time place and person according to primary service, vitals reviewed  HEENT: Deferred  Cardiovascular: Telemetry data reviewed, rest deferred   Pulmonary: deferred  Abdomen/GI: deferred  Neuro: deferred  Skin: deferred  Musculoskeletal:  deferred  Genitourinary: Deferred  Psych: deferred  Lymphatic/Immunologic: deferred    Intake and output:    I/O last 3 completed shifts: In: 3540.1 [I.V.:3120.1; Blood:420]  Out: 8552 [Urine:2925]    Lab Data:   All available labs and old records have been reviewed by me. CBC:  Recent Labs     11/09/20  1038 11/10/20  0437 11/11/20  0835   WBC 3.2* 4.6 3.8*   RBC 3.35* 3.37* 3.38*   HGB 10.1* 10.1* 10.2*   HCT 32.8* 33.0* 33.4*    126* 158   MCV 97.9 98.1 98.8   MCH 30.3 30.1 30.1   MCHC 30.9* 30.7* 30.5*   RDW 14.1 13.4 13.8        BMP:  Recent Labs     11/09/20  1038 11/10/20  0437 11/11/20  0835    140 147*   K 3.6 3.7 3.6   CL 97* 98* 99   CO2 37* 36* 45*   BUN 6* 7 6*   CREATININE <0.5* <0.5* <0.5*   CALCIUM 8.2* 8.4 8.7   GLUCOSE 88 163* 110*        Hepatic Function Panel:   Lab Results   Component Value Date    ALKPHOS 95 11/11/2020    ALT 27 11/11/2020    AST 21 11/11/2020    PROT 5.2 11/11/2020    BILITOT 0.3 11/11/2020    LABALBU 2.8 11/11/2020       CPK:   Lab Results   Component Value Date    CKTOTAL 25 (L) 11/04/2020     ESR: No results found for: SEDRATE  CRP:   Lab Results   Component Value Date    CRP 22.8 (H) 11/11/2020           Imaging:     All pertinent images and reports for the current visit were reviewed by me during this visit. CT LUMBAR SPINE WO CONTRAST   Final Result   Unremarkable non-contrast CT of the lumbar spine. No discrete sacral bone erosion or destruction evident. Correlate with CT pelvis for description of soft tissues adjacent to the   sacrum which are incompletely included in the field of view on this exam.         CT PELVIS WO CONTRAST Additional Contrast? None   Final Result   Deep left posterior soft tissue ulcer extending within 2 mm of the proximal   coccyx. No well-defined bony erosions are seen to suggest acute   osteomyelitis. There is fat stranding adjacent to the ulcer compatible with   cellulitis. Nonspecific prominent presacral fat stranding. Simple-appearing free fluid   along the right paracolic gutter. There is fat stranding extending from the skin to the level of the right   ischium compatible with cellulitis. Generalized subcutaneous edema about the pelvis and upper thighs. There is a small hyperdensity at the right bladder base. Unclear if this is   contrast from a recent CT or a true bladder lesion. This could be followed   up with CT or further assessed with a cystoscopy as clinically indicated. There is also air in the bladder. Correlate for recent intervention. CT HEAD WO CONTRAST   Final Result   Evaluation of the parenchyma, skull, and soft tissues at the vertex is motion   degraded. Otherwise, no acute intracranial abnormality. Bifrontal volume loss, greater than expected for age. Chronic sphenoid and ethmoid sinus mucosal disease. XR CHEST PORTABLE   Final Result   Small bilateral effusions and bibasilar airspace disease. Medications: All current and past medications were reviewed.      lidocaine-EPINEPHrine  20 mL Intradermal Once    cefTRIAXone (ROCEPHIN) IV  1 g Intravenous Q24H    metroNIDAZOLE  500 mg Intravenous Q8H    Sodium Hypochlorite   Irrigation BID  sodium chloride  20 mL Intravenous Once    dexamethasone (DECADRON) IVPB  20 mg Intravenous Daily    remdesivir IVPB  100 mg Intravenous Q24H    furosemide  40 mg Intravenous Once    sodium chloride  20 mL Intravenous Once    apixaban  5 mg Oral BID    vitamin C  500 mg Oral Daily    busPIRone  5 mg Oral BID    calcium-vitamin D  1 tablet Oral BID    folic acid  1 mg Oral Daily    ibuprofen  800 mg Oral TID WC    loperamide  2 mg Oral Q12H    therapeutic multivitamin-minerals  1 tablet Oral Daily    pantoprazole  40 mg Oral Daily    promethazine  25 mg Oral Q12H    QUEtiapine  25 mg Oral Nightly    vitamin B-12  500 mcg Oral Daily        dexmedetomidine 0.3 mcg/kg/hr (11/11/20 3992)       guaiFENesin-dextromethorphan, LORazepam, HYDROcodone-acetaminophen      Problem list:       Patient Active Problem List   Diagnosis Code    Vitamin B12 deficiency E53.8    Intestinal malabsorption following gastrectomy K91.2, Z90.3    KETAN (iron deficiency anemia) D50.9    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 Although every effort was made to ensure the accuracy of this automated transcription, some errors in transcription may have occurred inadvertently. If you may need any clarification, please do not hesitate to contact me through EPIC or at the phone number provided below with my electronic signature.   Any pictures or media included in this note were obtained after taking informed verbal consent from the patient and with their approval to include those in the patient's medical record.     Alyse Best MD, MPH  11/11/2020 , 5:09 PM   [facility] Infectious Disease   [address] [place] [address], [address] [place] [address]  Office: [phone]  Fax: [phone]  Clinic days:  Tuesday & Thursday

## 2020-11-11 NOTE — PROGRESS NOTES
Patient somehow managed to get bipap off and ask for \"pizza\". Patient's SpO2 dropped to 79-80%. Placed back on bipap at 100% FiO2 and SpO2 87%.

## 2020-11-11 NOTE — PLAN OF CARE
Problem:  Body Temperature -  Risk of, Imbalanced  Goal: Ability to maintain a body temperature within defined limits  Outcome: Ongoing  Goal: Will regain or maintain usual level of consciousness  Outcome: Ongoing  Goal: Complications related to the disease process, condition or treatment will be avoided or minimized  Outcome: Ongoing     Problem: Isolation Precautions - Risk of Spread of Infection  Goal: Prevent transmission of infection  Outcome: Ongoing     Problem: Nutrition Deficits  Goal: Optimize nutrtional status  Outcome: Ongoing     Problem: Risk for Fluid Volume Deficit  Goal: Maintain normal heart rhythm  Outcome: Ongoing  Goal: Maintain absence of muscle cramping  Outcome: Ongoing  Goal: Maintain normal serum potassium, sodium, calcium, phosphorus, and pH  Outcome: Ongoing     Problem: Loneliness or Risk for Loneliness  Goal: Demonstrate positive use of time alone when socialization is not possible  Outcome: Ongoing     Problem: Fatigue  Goal: Verbalize increase energy and improved vitality  Outcome: Ongoing     Problem: Fatigue  Goal: Verbalize increase energy and improved vitality  Outcome: Ongoing     Problem: Patient Education: Go to Patient Education Activity  Goal: Patient/Family Education  Outcome: Ongoing     Problem: Pain:  Goal: Pain level will decrease  Description: Pain level will decrease  Outcome: Ongoing  Goal: Control of acute pain  Description: Control of acute pain  Outcome: Ongoing  Goal: Control of chronic pain  Description: Control of chronic pain  Outcome: Ongoing     Problem: Nutrition  Goal: Optimal nutrition therapy  Outcome: Ongoing     Problem: Restraint Use - Nonviolent/Non-Self-Destructive Behavior:  Goal: Absence of restraint indications  Description: Absence of restraint indications  Outcome: Ongoing  Goal: Absence of restraint-related injury  Description: Absence of restraint-related injury  Outcome: Ongoing

## 2020-11-11 NOTE — PLAN OF CARE
Problem: Falls - Risk of:  Goal: Will remain free from falls  Description: Will remain free from falls  Outcome: Ongoing  Goal: Absence of physical injury  Description: Absence of physical injury  Outcome: Ongoing     Problem: Skin Integrity:  Goal: Will show no infection signs and symptoms  Description: Will show no infection signs and symptoms  Outcome: Ongoing  Goal: Absence of new skin breakdown  Description: Absence of new skin breakdown  Outcome: Ongoing     Problem: Airway Clearance - Ineffective  Goal: Achieve or maintain patent airway  Outcome: Ongoing     Problem: Gas Exchange - Impaired  Goal: Absence of hypoxia  Outcome: Ongoing  Goal: Promote optimal lung function  Outcome: Ongoing     Problem: Gas Exchange - Impaired  Goal: Absence of hypoxia  Outcome: Ongoing  Goal: Promote optimal lung function  Outcome: Ongoing     Problem: Breathing Pattern - Ineffective  Goal: Ability to achieve and maintain a regular respiratory rate  Outcome: Ongoing     Problem:  Body Temperature -  Risk of, Imbalanced  Goal: Ability to maintain a body temperature within defined limits  Outcome: Ongoing  Goal: Will regain or maintain usual level of consciousness  Outcome: Ongoing  Goal: Complications related to the disease process, condition or treatment will be avoided or minimized  Outcome: Ongoing     Problem: Isolation Precautions - Risk of Spread of Infection  Goal: Prevent transmission of infection  Outcome: Ongoing     Problem: Nutrition Deficits  Goal: Optimize nutrtional status  Outcome: Ongoing     Problem: Risk for Fluid Volume Deficit  Goal: Maintain normal heart rhythm  Outcome: Ongoing  Goal: Maintain absence of muscle cramping  Outcome: Ongoing  Goal: Maintain normal serum potassium, sodium, calcium, phosphorus, and pH  Outcome: Ongoing     Problem: Risk for Fluid Volume Deficit  Goal: Maintain normal heart rhythm  Outcome: Ongoing  Goal: Maintain absence of muscle cramping  Outcome: Ongoing  Goal: Maintain normal serum potassium, sodium, calcium, phosphorus, and pH  Outcome: Ongoing

## 2020-11-11 NOTE — PLAN OF CARE
Problem: Falls - Risk of:  Goal: Will remain free from falls  Description: Will remain free from falls  Outcome: Ongoing     Problem: Falls - Risk of:  Goal: Absence of physical injury  Description: Absence of physical injury  Outcome: Ongoing  Patient educated on the importance of calling out before exiting the bed and always waiting for assistance. Bed in lowest position with wheels locked. Alarm activated. 3/4 side rails up. Non-skid socks on. Call light within reach. Hourly checks. All requested needs provided. Problem: Skin Integrity:  Goal: Will show no infection signs and symptoms  Description: Will show no infection signs and symptoms  Outcome: Ongoing     Problem: Skin Integrity:  Goal: Absence of new skin breakdown  Description: Absence of new skin breakdown  Outcome: Ongoing  Repositioned with pillow support q 2 hrs for comfort and the prevention of pressure ulcer formation. Problem: Gas Exchange - Impaired  Goal: Absence of hypoxia  Outcome: Ongoing     Problem: Gas Exchange - Impaired  Goal: Promote optimal lung function  Outcome: Ongoing  Sating 100% on BIPAP. Problem: Body Temperature -  Risk of, Imbalanced  Goal: Ability to maintain a body temperature within defined limits  Outcome: Ongoing  Body temp WNL. Problem: Isolation Precautions - Risk of Spread of Infection  Goal: Prevent transmission of infection  Outcome: Ongoing  Hands washed before entering and exiting room. Proper PPE worn in room. Appropriate caution practiced. Problem: Pain:  Goal: Pain level will decrease  Description: Pain level will decrease  Outcome: Ongoing     Problem: Pain:  Goal: Control of acute pain  Description: Control of acute pain  Outcome: Ongoing     Problem: Pain:  Goal: Control of chronic pain  Description: Control of chronic pain  Outcome: Ongoing  Pain level 0/10 at this time. No evidence of distress.         Problem: Restraint Use - Nonviolent/Non-Self-Destructive

## 2020-11-11 NOTE — PROGRESS NOTES
Shift handoff and assessment complete. Patient is in droplet plus iso for +COVID-19. Proper PPE applied when entering room: gown, gloves, face mask, and PAPR. Nursing care clustered for the safety of the patient, RN, and rest of the unit. The patient is a non-ambulatory, functional quadriplegic from [facility]. Discharged from our hospital on 11/06/20. She intubated during that admission d/t COVID. Back 11/09/20 and to  for AMS/unresponsiveness and fever. Transferred today to [facility] when oxygen requirements increased. BUE contracted with tremors, but can follow commands. BLE flaccid with decreased sensation. She is A&Ox4, but has short term memory loss and confusion. Agitated, restless, and impulsive at this time. On heated HFNC on 60 L at 90%. Dyspnea at rest. Diminished breath sounds throughout. In NSR on monitor. No adventitious breath sounds. Abdomen soft and rounded with active bowel sounds in all four quadrants. +4 pitting edema in BLE. Unstageable pressure ulcer on coccyx. See LDA for wound assessments and image. Skin otherwise pale, dry, and intact. Graf patent with yellow, hazy urine. Continuously yelling out and pulling off canula with sats dropping into 70s. Restraints in place for the safety of the patient, her lines, and her tubing. Passive ROM performed on all four extremities. Will monitor closely.

## 2020-11-11 NOTE — PROGRESS NOTES
First unit of convalescent plasma started. Patient lethargic and no longer agitated, restless, or loud and was not given anything for mood. MD contacted and STAT ABG ran. RT called for Bipap initiation. Precedex ordered if needed, but patient currently calm and tolerating mask well. Will monitor.

## 2020-11-11 NOTE — PROGRESS NOTES
Patient arrived from 5T to 36. Patient on Airvo at 90%, 60L, calm at this time and alert to name. Lungs diminished throughout. Pitting edema to bilateral lower extremities. SR on monitor. Dressing to buttocks is clean, dry and intact.

## 2020-11-11 NOTE — PROGRESS NOTES
hour   Intake 3660.1 ml   Output 2925 ml   Net 735.1 ml      Wt Readings from Last 3 Encounters:   11/11/20 153 lb 3.2 oz (69.5 kg)   11/06/20 158 lb 11.7 oz (72 kg)   09/22/20 132 lb 8 oz (60.1 kg)       General appearance:  Appears comfortable  Eyes: Sclera clear. Pupils equal.  ENT: Moist oral mucosa. Trachea midline, no adenopathy. Cardiovascular: Regular rhythm, normal S1, S2. No murmur. No edema in lower extremities  Respiratory: r diffuse wheezing bilaterally. GI: Abdomen soft, no tenderness, not distended, normal bowel sounds  Musculoskeletal: No cyanosis in digits, neck supple  Neurology: CN 2-12 grossly intact. No speech or motor deficits  Psych: Normal affect. Alert and oriented in time, place and person  Skin: Warm, dry, normal turgor    Labs and Tests:  CBC:   Recent Labs     11/09/20  1038 11/10/20  0437   WBC 3.2* 4.6   HGB 10.1* 10.1*    126*     BMP:    Recent Labs     11/09/20  1038 11/10/20  0437    140   K 3.6 3.7   CL 97* 98*   CO2 37* 36*   BUN 6* 7   CREATININE <0.5* <0.5*   GLUCOSE 88 163*     Hepatic:   Recent Labs     11/09/20  1038 11/10/20  0437   AST 41* 22   ALT 42* 31   BILITOT 0.4 <0.2   ALKPHOS 105 97       Discussed care with family and patient             Spent 35  Minutes CC time  with patient and family at bedside and on unit reviewing medical records and labs, spent greater than 50% time counseling patient and family on diagnosis and plan   Problem List  Active Problems:    Vitamin B12 deficiency    Intestinal malabsorption following gastrectomy    Acute respiratory failure with hypoxemia (HCC)    Pulmonary embolus (HCC)    Enterococcus UTI    Functional quadriplegia (HCC)    Sepsis ([address]    Sacral wound    Elevated C-reactive protein (CRP)    Thrombocytopenia (HCC)    Chronic anemia    History of pulmonary embolism    Seizure disorder ([address]    Ex-smoker  Resolved Problems:    * No resolved hospital problems. *       Assessment & Plan:   1.  Acute hypoxic respiratory failure   -secondary to COVID 19 infection with underlying wound infection  - pt was on 6l NC on presentation now on 60l, optiflow.  -Patient was transferred to ICU for further evaluation  -Weaning Decadron convalescent plasma and remdesivir for now  -Pulmonary and critical care recommendation  -Anticoagulated on Xarelto which will help.  -Close monitoring ICU for worsening respiratory status patient has needed intubation on last admission  -ABG did show some hypercapnia as well  -This is improved significantly. Now on BiPAP  -On Precedex drip for agitation. Sacral decub ulcer   - with concerns for infection  - was started on vanc and cefepfime by attending provider   - would culture growing Ecoli. -Further recs to follow.     COVD 19 infection  -as noted above  - she was known positive on last discharge but now worsening respiratory status now     Acute metabolic  encephaloapthy  - due to above           Diet: Diet NPO Effective Now  Code:Full Code  DVT PPX lovenox  Disposition continue ICU care      Joshua Payne MD   11/11/2020 7:45 AM

## 2020-11-11 NOTE — PROGRESS NOTES
Patient on Airvo at 90%, 60 with SpO2 93-94%. Patient pulled off cannula and SpO2 dropped in the 60's,  HR dropped into the 30s/40s then came right back up. Explained to patient importance of leaving cannula on as it is providing her O2. No evidence of understanding.

## 2020-11-11 NOTE — PLAN OF CARE
Anticipated bedside sacral wound debridement today, but increased hypoxia with maximal Bi-PAP support. Wound stable at last visit; will attempt to see at a later date. Continue with local wound care with Dakins wet-to-dry dressing BID. Plans discussed with nursing. Reviewed and discussed with Dr. Myrtle Bryson.       Signed:  TORI Dasilva - CNP  11/11/2020 2:32 PM

## 2020-11-11 NOTE — CONSULTS
seizures. PSYCHIATRIC: Denies mood swings or depression. ENDOCRINE: Denies heat or cold intolerance or excessive thirst.  HEMATOLOGIC/LYMPHATIC: Denies easy bruising or lymph node swelling. ALLERGIC/IMMUNOLOGIC: No environmental allergies. PAST MEDICAL HISTORY:   Past Medical History:   Diagnosis Date    Adult failure to thrive     Allergic     Anemia     related to gastric bypass, followed by Dr. Modesto Goodman Calculus of kidney     [facility]     Chronic neck pain     Copper deficiency     Depression     Hypertension     Pulmonary emboli (HCC)     Sciatica     Seizures ([facility] 3/2014    hypoglycemia and/or benzo withdrawal    Vitamin B12 deficiency neuropathy ([facility]        PAST SURGICAL HISTORY:   Past Surgical History:   Procedure Laterality Date    ABDOMINOPLASTY      CHOLECYSTECTOMY      GASTRIC BYPASS SURGERY      TUNNELED VENOUS PORT PLACEMENT Right 5/2/2014    Dr. Gomez Record HISTORY:   Social History     Tobacco Use    Smoking status: Former Smoker     Packs/day: 1.00     Types: Cigarettes    Smokeless tobacco: Never Used   Substance Use Topics    Alcohol use: No    Drug use: No       FAMILY HISTORY:   Family History   Problem Relation Age of Onset    Dementia Mother     High Blood Pressure Mother     Thyroid Cancer Mother     Heart Disease Father     Stroke Father     Heart Attack Father     Thyroid Cancer Maternal Grandmother     High Blood Pressure Maternal Grandmother     Heart Disease Maternal Grandmother     Lung Cancer Paternal Grandfather        MEDICATIONS:     No current facility-administered medications on file prior to encounter. Current Outpatient Medications on File Prior to Encounter   Medication Sig Dispense Refill    apixaban (ELIQUIS) 5 MG TABS tablet Take 5 mg by mouth 2 times daily      buprenorphine-naloxone (SUBOXONE) 8-2 MG FILM SL film Place 1 Film under the tongue every morning.       buprenorphine-naloxone (SUBOXONE) 2-0.5 MG FILM SL film Place 1 Film under the tongue every evening.       Melatonin 10 MG TABS Take 10 mg by mouth nightly as needed      QUEtiapine (SEROQUEL) 50 MG tablet Take 0.5 tablets by mouth nightly 60 tablet 3    folic acid (FOLVITE) 1 MG tablet Take 1 tablet by mouth daily 30 tablet 3    vitamin B-12 (CYANOCOBALAMIN) 500 MCG tablet Take 500 mcg by mouth daily      promethazine (PHENERGAN) 25 MG tablet Take 25 mg by mouth every 12 hours      loperamide (IMODIUM) 2 MG capsule Take 2 mg by mouth every 12 hours      ibuprofen (ADVIL;MOTRIN) 800 MG tablet Take 800 mg by mouth every 12 hours as needed for Pain       pantoprazole (PROTONIX) 40 MG tablet Take 40 mg by mouth daily      busPIRone (BUSPAR) 5 MG tablet Take 5 mg by mouth 2 times daily       Calcium Carb-Cholecalciferol (CALCIUM 600-D PO) Take 1 tablet by mouth 2 times daily       Multiple Vitamins-Minerals (THERAPEUTIC MULTIVITAMIN-MINERALS) tablet Take 1 tablet by mouth daily      Ascorbic Acid (VITAMIN C) 500 MG CAPS Take 500 mg by mouth daily 30 capsule 3        Sodium Hypochlorite   Irrigation BID    sodium chloride  20 mL Intravenous Once    dexamethasone (DECADRON) IVPB  20 mg Intravenous Daily    [START ON 11/11/2020] remdesivir IVPB  100 mg Intravenous Q24H    furosemide  40 mg Intravenous Once    sodium chloride  20 mL Intravenous Once    apixaban  5 mg Oral BID    vitamin C  500 mg Oral Daily    busPIRone  5 mg Oral BID    calcium-vitamin D  1 tablet Oral BID    folic acid  1 mg Oral Daily    ibuprofen  800 mg Oral TID WC    loperamide  2 mg Oral Q12H    therapeutic multivitamin-minerals  1 tablet Oral Daily    pantoprazole  40 mg Oral Daily    promethazine  25 mg Oral Q12H    QUEtiapine  25 mg Oral Nightly    vitamin B-12  500 mcg Oral Daily      dexmedetomidine       guaiFENesin-dextromethorphan, LORazepam, HYDROcodone-acetaminophen      ALLERGIES:   Allergies as of 11/09/2020 - Review Complete 11/09/2020   Allergen Reaction Noted    Acetaminophen Other (See Comments) 03/08/2016    Codeine Hives and Itching 06/16/2013    Morphine Itching 03/22/2010    Penicillins Hives and Swelling 03/22/2010    Morphine and related Nausea And Vomiting 05/12/2015    Ondansetron hcl Itching and Rash 08/20/2014      OBJECTIVE:   height is 5' 7\" (1.702 m) and weight is 158 lb (71.7 kg). Her temporal temperature is 99.4 °F (37.4 °C). Her blood pressure is 102/61 and her pulse is 85. Her respiration is 20 and oxygen saturation is 99%. I/O this shift:  In: -   Out: 1350 [Urine:1350]     PHYSICAL EXAM:          CONSTITUTIONAL: She is a 50y.o.-year-old who appears her stated age. She is alert and oriented x 3 and in no acute distress. HEENT: PERRLA, EOMI. No scleral icterus. No thrush, atraumatic, normocephalic. NECK: Supple, without cervical or supraclavicular lymphadenopathy:  CARDIOVASCULAR: Deferred due to full PPE precautions  RESPIRATORY & CHEST: Deferred due to full PPE precautions  GASTROINTESTINAL & ABDOMEN: Soft, nontender,  non-distended, without hepatosplenomegaly. GENITOURINARY: Deferred. MUSCULOSKELETAL: No tenderness to palpation of the axial skeleton. There is no clubbing. No cyanosis. No edema of the lower extremities. SKIN OF BODY: No rash or jaundice. PSYCHIATRIC EVALUATION: Normal affect. Patient answers questions appropriately. HEMATOLOGIC/LYMPHATIC/ IMMUNOLOGIC: No palpable lymphadenopathy. NEUROLOGIC: Alert and oriented x 3. Groslly non-focal. Motor strength is 5+/5 in all muscle groups. The patient has a normal sensorium globally.           LABS:  Recent Labs     11/09/20  1038 11/10/20  0437   WBC 3.2* 4.6   HGB 10.1* 10.1*   HCT 32.8* 33.0*    126*   ALT 42* 31   AST 41* 22    140   K 3.6 3.7   CL 97* 98*   CREATININE <0.5* <0.5*   BUN 6* 7   CO2 37* 36*   INR 1.21*  --        Recent Labs     11/09/20  1038 11/10/20  0437   GLUCOSE 88 163*   CALCIUM 8.2* 8.4    140   K 3.6 3.7   CO2 37* 36*   CL 97* 98*   BUN 6* 7   CREATININE <0.5* <0.5*       No results for input(s): PHART, NUT3PWJ, PO2ART, YNL2CCD, K1WGMMMR, BEART, T8XNZNBR in the last 72 hours. Lab Results   Component Value Date    INR 1.21 (H) 11/09/2020    INR 1.60 (H) 11/04/2020    INR 0.97 09/18/2020    PROTIME 14.1 (H) 11/09/2020    PROTIME 18.6 (H) 11/04/2020    PROTIME 11.2 09/18/2020     No results found for: AMYLASE   No results found for: LABA1C  No results found for: EAG  Lab Results   Component Value Date    TSH 1.14 09/20/2020     Lab Results   Component Value Date    CKTOTAL 25 (L) 11/04/2020    TROPONINI <0.01 11/09/2020      Lab Results   Component Value Date    CRP 24.5 (H) 11/09/2020      No results found for: BNP   No results found for: DDIMER   Lab Results   Component Value Date    FERRITIN 160.6 02/02/2017      Lab Results   Component Value Date    LACTA 0.5 09/18/2020           IMAGING:    Chest x-ray portable 1 view done on 11/9/2020 was personally viewed by me and my interpretation is: Bibasilar atelectasis seen. No clear pulmonary infiltrates are visible. No pneumothorax, pleural effusion seen. Cardiac silhouette is within normal limits. IMPRESSION:     1. Acute hypoxic respiratory failure  2. Recent diagnosis of COVID-19  3. Functional quadriplegia  4. Opioid dependence  5. Anxiety/depression  6. Heart failure with preserved ejection fraction in mild exacerbation  7. Recent history of pulmonary embolism now on anticoagulation  8. Extensive decubitus ulcer    RECOMMENDATION:     1. Patient has presented to the hospital with generalized fatigue and now found to have acute hypoxic respiratory failure. 2. Hypoxic respiratory failure is most likely multifactorial with Covid infection, heart failure with preserved ejection fraction and exacerbation and bibasilar atelectasis contributing. 3. Patient has maxed out on high flow nasal cannula.   Will recommend to transition her to BiPAP with a setting of 12/6 cm H2O and FiO2 titrate to maintain SPO2 above 90%. 4. Patient to continue on Eliquis which will protect from recurrence of pulmonary embolism. 5. We will also recommend to give Lasix 40 mg IV x1.  6. Patient has anxiety which is also contributing to her shortness of breath. Will recommend to start her on Precedex gtt. 7. She has been started on IV Decadron 20 mg, IV Remdesivir and has been given 2 units of convalescent plasma. 8. Would consult wound care regarding management of decubitus ulcer. Patient did receive 1 dose of vancomycin and cefepime at the time of admission. ID on board. We will follow their recommendations. Thank you for your consultation. We will continue to follow the patient. Chetna Fraga MD  Pulmonary Critical Care and Sleep Medicine  11/9/2020, 7:40 PM    This note was completed using dragon medical speech recognition software. Grammatical errors, random word insertions, pronoun errors and incomplete sentences are occasional consequences of this technology due to software limitations. If there are questions or concerns about the content of this note of information contained within the body of this dictation they should be addressed with the provider for clarification.

## 2020-11-12 NOTE — PROGRESS NOTES
[facility] PROGRESS NOTE    11/12/2020 7:52 AM        Name: Josh Ennis . Admitted: 11/9/2020  Primary Care Provider: Referring Not In System (Inactive) (Tel: None)                        Subjective:  . No overnight event. Patient resting. Still on 70 L FiO2. Positive pressure. Still complaining of shortness of breath.       Current Medications  lidocaine-EPINEPHrine 1 percent-1:116964 injection 20 mL, Once  metronidazole (FLAGYL) 500 mg in NaCl 100 mL IVPB premix, Q8H  cefTRIAXone (ROCEPHIN) 2 g IVPB in D5W 50ml minibag, Q24H  Sodium Hypochlorite (DAKINS) 0.25 % external solution, BID  guaiFENesin-dextromethorphan (ROBITUSSIN DM) 100-10 MG/5ML syrup 10 mL, Q4H PRN  0.9 % sodium chloride bolus, Once  dexamethasone (DECADRON) 20 mg in sodium chloride 0.9 % IVPB, Daily  remdesivir 100 mg in sodium chloride 0.9 % 250 mL IVPB, Q24H  furosemide (LASIX) injection 40 mg, Once  0.9 % sodium chloride bolus, Once  LORazepam (ATIVAN) injection 0.5 mg, Q6H PRN  dexmedetomidine (PRECEDEX) 400 mcg in sodium chloride 0.9 % 100 mL infusion, Continuous  apixaban (ELIQUIS) tablet 5 mg, BID  vitamin C (ASCORBIC ACID) tablet 500 mg, Daily  busPIRone (BUSPAR) tablet 5 mg, BID  calcium-vitamin D 500-200 MG-UNIT per tablet 1 tablet, BID  folic acid (FOLVITE) tablet 1 mg, Daily  ibuprofen (ADVIL;MOTRIN) tablet 800 mg, TID WC  loperamide (IMODIUM) capsule 2 mg, Q12H  therapeutic multivitamin-minerals 1 tablet, Daily  pantoprazole (PROTONIX) tablet 40 mg, Daily  promethazine (PHENERGAN) tablet 25 mg, Q12H  QUEtiapine (SEROQUEL) tablet 25 mg, Nightly  vitamin B-12 (CYANOCOBALAMIN) tablet 500 mcg, Daily  HYDROcodone-acetaminophen (NORCO) 7.5-325 MG per tablet 1 tablet, Q4H PRN        Objective:  BP (!) 141/82   Pulse 60   Temp 97 °F (36.1 °C) (Temporal)   Resp 23   Ht 5' 7\" (1.702 m)   Wt 150 lb 9.6 oz (68.3 kg)   LMP 05/17/2014   SpO2 96%   BMI 23.59 kg/m²     Intake/Output Summary (Last 24 hours) at 11/12/2020 0752  Last data filed at 11/12/2020 0600  Gross per 24 hour   Intake 688.6 ml   Output 1525 ml   Net -836.4 ml      Wt Readings from Last 3 Encounters:   11/12/20 150 lb 9.6 oz (68.3 kg)   11/06/20 158 lb 11.7 oz (72 kg)   09/22/20 132 lb 8 oz (60.1 kg)       General appearance:  Appears comfortable  Eyes: Sclera clear. Pupils equal.  ENT: Moist oral mucosa. Trachea midline, no adenopathy. Cardiovascular: Regular rhythm, normal S1, S2. No murmur. No edema in lower extremities  Respiratory: r diffuse wheezing bilaterally. GI: Abdomen soft, no tenderness, not distended, normal bowel sounds  Musculoskeletal: No cyanosis in digits, neck supple  Neurology: CN 2-12 grossly intact. No speech or motor deficits  Psych: Normal affect.  Alert and oriented in time, place and person  Skin: Warm, dry, normal turgor    Labs and Tests:  CBC:   Recent Labs     11/09/20  1038 11/10/20  0437 11/11/20  0835   WBC 3.2* 4.6 3.8*   HGB 10.1* 10.1* 10.2*    126* 158     BMP:    Recent Labs     11/09/20  1038 11/10/20  0437 11/11/20  0835    140 147*   K 3.6 3.7 3.6   CL 97* 98* 99   CO2 37* 36* 45*   BUN 6* 7 6*   CREATININE <0.5* <0.5* <0.5*   GLUCOSE 88 163* 110*     Hepatic:   Recent Labs     11/09/20  1038 11/10/20  0437 11/11/20  0835   AST 41* 22 21   ALT 42* 31 27   BILITOT 0.4 <0.2 0.3   ALKPHOS 105 97 95       Discussed care with family and patient             Spent 35  Minutes CC time  with patient and family at bedside and on unit reviewing medical records and labs, spent greater than 50% time counseling patient and family on diagnosis and plan   Problem List  Active Problems:    Vitamin B12 deficiency    Intestinal malabsorption following gastrectomy    Acute respiratory failure with hypoxemia (HCC)    Pulmonary embolus ([address]    Enterococcus UTI    Functional quadriplegia ([address]    Sepsis ([facility]    Sacral wound    Elevated C-reactive protein (CRP)    Thrombocytopenia (HCC)    Chronic anemia    History of pulmonary embolism    Seizure disorder ([facility]    Ex-smoker    E coli infection  Resolved Problems:    * No resolved hospital problems. *       Assessment & Plan:   1. Acute hypoxic respiratory failure   -secondary to COVID 19 infection with underlying wound infection  - pt was on 6l NC on presentation now on 60l, optiflow.  -Patient was transferred to ICU for further evaluation  - oxygen requirmetn is still high at 60L   -Weaning Decadron convalescent plasma and remdesivir for now  -Pulmonary and critical care recommendation  -Anticoagulated on Xarelto which will help.  -Close monitoring ICU          Sacral decub ulcer   - with concerns for infection  - would culture growing Ecoli. -Patient will need debridement but due to worsening respiratory failure and hypoxia on BiPAP it has been delayed. -Tinea dressing changes per general surgery.     COVD 19 infection  -as noted above  - she was known positive on last discharge but now worsening respiratory status now     Acute metabolic  encephaloapthy  - due to above           Diet: Diet NPO Effective Now  Code:Full Code  DVT PPX lovenox  Disposition continue ICU care      Jose Frost MD   11/12/2020 7:52 AM

## 2020-11-12 NOTE — PROGRESS NOTES
Infectious Diseases   Progress Note      Admission Date: 11/9/2020  Hospital Day: Hospital Day: 4   Attending: Jose Frost MD  Date of service: 11/12/2020     Chief complaint/ Reason for consult:     · Acute respiratory failure with hypoxia  · Severe COVID-19 pneumonia  · Elevated CRP of 24.5 in setting of COVID-19  · Thrombocytopenia platelet count of 560 in setting of COVID-19  · Coccygeal wound infection with E. coli    Microbiology:        I have reviewed allavailable micro lab data and cultures    · Blood culture (2/2) - collected on 11/9/2020:  Negative so far   · Urine Legionella and pneumococcal antigens- collected on 11/9/2020: Negative  · COVID-19 PCR test :    SARS-CoV-2, PCR   COVID-19   Collected:  11/04/20 1410    Result status:  Final    Resulting lab:  [address] [place] [address] LAB    Reference range:  Not Detected    Value:  DETECTEDAbnormal       Comment: This test has been authorized by FDA under an   Emergency Use Authorization (EUA). · Coccygeal wound culture: Heavy growth of E.  Coli    Escherichia coli (1)     Antibiotic  Interpretation  KRZYSZTOF  Status     ampicillin  Resistant  >=32  mcg/mL      ceFAZolin  Sensitive  <=4  mcg/mL      cefepime  Sensitive  <=0.12  mcg/mL      cefTRIAXone  Sensitive  <=0.25  mcg/mL      ciprofloxacin  Resistant  >=4  mcg/mL      ertapenem  Sensitive  <=0.12  mcg/mL      gentamicin  Sensitive  <=1  mcg/mL      levofloxacin  Resistant  >=8  mcg/mL      piperacillin-tazobactam  Sensitive  <=4  mcg/mL      trimethoprim-sulfamethoxazole  Resistant  >=320  mcg/mL              Antibiotics and immunizations:       Current antibiotics: All antibiotics and their doses were reviewed by me    Recent Abx Admin                   cefTRIAXone (ROCEPHIN) 2 g IVPB in D5W 50ml minibag (g) 2 g New Bag 11/12/20 1334    metronidazole (FLAGYL) 500 mg in NaCl 100 mL IVPB premix (mg) 500 mg New Bag 11/12/20 1145     500 mg New Bag  0425     500 mg New Bag 11/11/20 2378 Immunization History: All immunization history was reviewed by me today. There is no immunization history for the selected administration types on file for this patient. Known drug allergies: All allergies were reviewed and updated    Allergies   Allergen Reactions    Acetaminophen Other (See Comments)     Pt states \"I am not supposed to take it because of my liver syndrome\"    Codeine Hives and Itching    Morphine Itching    Penicillins Hives and Swelling     Facial swelling    Morphine And Related Nausea And Vomiting    Ondansetron Hcl Itching and Rash       Social history:     Social History:  All social andepidemiologic history was reviewed and updated by me today as needed. · Tobacco use:   reports that she has quit smoking. Her smoking use included cigarettes. She smoked 1.00 pack per day. She has never used smokeless tobacco.  · Alcohol use:   reports no history of alcohol use. · Currently lives in: [address] [place] [address] Dr Sharma  reports no history of drug use. Assessment:     The patient is a 50 y.o. old female who  has a past medical history of Adult failure to thrive, Allergic, Anemia, Anxiety, Calculus of kidney, CHF (congestive heart failure) ([address] Chronic neck pain, Copper deficiency, Depression, Hypertension, Pulmonary emboli ([address] Sciatica, Seizures ([address] (3/2014), and Vitamin B12 deficiency neuropathy ([address] with following problems:    · Acute respiratory failure with hypoxia-currently on high flow oxygen at 60 L/min  · Severe COVID-19 pneumonia-currently on remdesivir and Decadron  · Elevated CRP of 24.5 in setting of COVID-19  · Thrombocytopenia platelet count of 945 in setting of COVID-19-monitor trends-covered with IV ceftriaxone and Flagyl  · Coccygeal wound infection with E. coli  · A+ blood group  · Chronic anemia  · History of pulmonary embolism  · Seizure disorder  · Ex smoker      Discussion:      Her blood cultures are negative so far.   Urine Legionella and pneumococcal antigens are negative. Coccygeal wound culture has grown E. coli so far. It is covered with ceftriaxone the patient has a large sacral decubitus wound. Portable chest x-ray reviewed. Shows bilateral infiltrates and pleural effusions. Plan:     Diagnostic Workup:    · Continue to monitor LDH, D-dimer, CRP, procalcitonin every other day for now. · Continue to follow  fever curve, WBC count and blood cultures. · Follow up on blood counts, liver and renal function. · Continue to watch for any hyperglycemia, transaminitis, renal dysfunction, anemia, constipation issues while on remdesivir. Antimicrobials:    · Will continue IV remdesivir 100 mg every 24 hours. Today is day 3 of remdesivir  · Continue Decadron daily at a dose of 20 mg daily  · Continue IV ceftriaxone 2 g every 24 hours to cover for E. coli coccygeal wound infection  · Continue Flagyl 500 mg every 8 hour. Anaerobes are sometimes hard to grow in in vitro cultures  · Encourage frequent change in postures and awake prone positioning as tolerated. · Cough and deep breathing exercises. · Anticoagulation per primary and pulmonary. · Continue to monitor vitals closely. · Continue supplemental oxygen/high flow/BiPAP as needed to oxygen saturation above 92%. · Maintain good glycemic control. · Continue to watch for new or worsening fever or productive cough or other signs of secondary bacterial infection. · Fall and aspiration precautions. · COVID-19 isolation precautions. · Continue close monitoring in the COVID-19 unit. Remains at high risk of complications. · Discussed all above with RN       Drug Monitoring:    · Continue monitoring for antimicrobial agent toxicity as follows: CBC, CMP  · Continue to watch for following: new or worsening fever, new hypotension, hives, lip swelling and redness or purulence at vascular access sites.      I/v access Management:    · Continue to monitor i.v access sites for erythema, induration, discharge or tenderness. · As always, continue efforts to minimize tubes/lines/drains as clinically appropriate to reduce chances of line associated infections. Level of complexity of visit: High     Risk of Complications/Morbidity: High     · Illness(es)/ Infection present that pose threat to life/bodily function. · There is potential for severe exacerbation of infection/side effects of treatment. · Therapy requires intensive monitoring for antimicrobial agent toxicity. Thank you for involving me in the care of your patient. I will continue to follow. If you have anyadditional questions, please do not hesitate to contact me. Subjective: Interval history: Interval history was obtained from chart review and RN. The patient remains on high flow oxygen. She is requiring 60 L oxygen via high flow nasal cannula. Remains on remdesivir and high-dose of Decadron     REVIEW OF SYSTEMS:      Review of Systems   Unable to perform ROS: Acuity of condition         Past Medical History: All past medical history reviewed today. Past Medical History:   Diagnosis Date    Adult failure to thrive     Allergic     Anemia     related to gastric bypass, followed by Dr. Alena Figueroa    Anxiety     Calculus of kidney     CHF (congestive heart failure) ([address]     Chronic neck pain     Copper deficiency     Depression     Hypertension     Pulmonary emboli (HCC)     Sciatica     Seizures ([address] 3/2014    hypoglycemia and/or benzo withdrawal    Vitamin B12 deficiency neuropathy (HCC)        Past Surgical History: All past surgical history was reviewed today. Past Surgical History:   Procedure Laterality Date    ABDOMINOPLASTY      CHOLECYSTECTOMY      GASTRIC BYPASS SURGERY      TUNNELED VENOUS PORT PLACEMENT Right 5/2/2014    Dr. Tan Badillo       Family History: All family history was reviewed today.         Problem Relation Age of Onset    Dementia Mother     High Blood Pressure Mother     Thyroid Cancer Mother     Heart Disease Father     Stroke Father     Heart Attack Father     Thyroid Cancer Maternal Grandmother     High Blood Pressure Maternal Grandmother     Heart Disease Maternal Grandmother     Lung Cancer Paternal Grandfather        Objective:       PHYSICAL EXAM:      Vitals:   Vitals:    11/12/20 1400 11/12/20 1500 11/12/20 1529 11/12/20 1600   BP: (!) 136/95 130/87  112/79   Pulse: 95 83  85   Resp: 22 24 10    Temp:       TempSrc:       SpO2: 97% 97% 96% 96%   Weight:       Height:           Physical Exam       PHYSICAL EXAM:     In-person bedside physical examination deferred. Pursuant to the emergency declaration under the [address] [place] [address] and the [employer] Act, this clinical encounter was conducted to provide necessary medical care. (Also consistent with new provisions and guidance offered by [facility] on March 18, 2020 in setting of COVID 19 outbreak and in order to preserve personal protective equipment in accordance with the flexibilities announced by CMS on March 30, 2020)   References: https://[facility]. [facility]/Portals/0/Resources/COVID-19/3_18%20Telemed%20Guidance%20Updated%20March%2018. pdf?eor=[email or web address]                      [email or web address]://[facility]. [facility]/Portals/0/Resources/COVID-19/3_18%20Telemed%20Guidance%20Updated%20March%2018. pdf?fhd=[email or web address]                      [email or web address] pdf                            General: Awake and oriented to time place and person according to primary service, vitals reviewed  HEENT: Deferred  Cardiovascular: Telemetry data reviewed, rest deferred   Pulmonary: deferred  Abdomen/GI: deferred  Neuro: deferred  Skin: deferred  Musculoskeletal:  deferred  Genitourinary: Deferred  Psych: deferred  Lymphatic/Immunologic: deferred    Intake and output:    I/O last 3 completed shifts: In: 900.4 [I.V.:428.4; IV Piggyback:472]  Out: 1876 [Urine:1350]    Lab Data:   All available labs and old records have been reviewed by me. CBC:  Recent Labs     11/10/20  0437 11/11/20  0835   WBC 4.6 3.8*   RBC 3.37* 3.38*   HGB 10.1* 10.2*   HCT 33.0* 33.4*   * 158   MCV 98.1 98.8   MCH 30.1 30.1   MCHC 30.7* 30.5*   RDW 13.4 13.8        BMP:  Recent Labs     11/10/20  0437 11/11/20  0835    147*   K 3.7 3.6   CL 98* 99   CO2 36* 45*   BUN 7 6*   CREATININE <0.5* <0.5*   CALCIUM 8.4 8.7   GLUCOSE 163* 110*        Hepatic Function Panel:   Lab Results   Component Value Date    ALKPHOS 95 11/11/2020    ALT 27 11/11/2020    AST 21 11/11/2020    PROT 5.2 11/11/2020    BILITOT 0.3 11/11/2020    LABALBU 2.8 11/11/2020       CPK:   Lab Results   Component Value Date    CKTOTAL 25 (L) 11/04/2020     ESR: No results found for: SEDRATE  CRP:   Lab Results   Component Value Date    CRP 22.8 (H) 11/11/2020           Imaging: All pertinent images and reports for the current visit were reviewed by me during this visit. XR CHEST PORTABLE   Final Result   Bilateral pleural effusions and bibasilar opacities persists suggesting   atelectasis or pneumonia         CT LUMBAR SPINE WO CONTRAST   Final Result   Unremarkable non-contrast CT of the lumbar spine. No discrete sacral bone erosion or destruction evident. Correlate with CT pelvis for description of soft tissues adjacent to the   sacrum which are incompletely included in the field of view on this exam.         CT PELVIS WO CONTRAST Additional Contrast? None   Final Result   Deep left posterior soft tissue ulcer extending within 2 mm of the proximal   coccyx. No well-defined bony erosions are seen to suggest acute   osteomyelitis. There is fat stranding adjacent to the ulcer compatible with   cellulitis. Nonspecific prominent presacral fat stranding.   Simple-appearing free fluid   along the right paracolic gutter. There is fat stranding extending from the skin to the level of the right   ischium compatible with cellulitis. Generalized subcutaneous edema about the pelvis and upper thighs. There is a small hyperdensity at the right bladder base. Unclear if this is   contrast from a recent CT or a true bladder lesion. This could be followed   up with CT or further assessed with a cystoscopy as clinically indicated. There is also air in the bladder. Correlate for recent intervention. CT HEAD WO CONTRAST   Final Result   Evaluation of the parenchyma, skull, and soft tissues at the vertex is motion   degraded. Otherwise, no acute intracranial abnormality. Bifrontal volume loss, greater than expected for age. Chronic sphenoid and ethmoid sinus mucosal disease. XR CHEST PORTABLE   Final Result   Small bilateral effusions and bibasilar airspace disease. Medications: All current and past medications were reviewed.      enoxaparin  1 mg/kg Subcutaneous BID    lidocaine-EPINEPHrine  20 mL Intradermal Once    metroNIDAZOLE  500 mg Intravenous Q8H    cefTRIAXone (ROCEPHIN) IV  2 g Intravenous Q24H    Sodium Hypochlorite   Irrigation BID    sodium chloride  20 mL Intravenous Once    dexamethasone (DECADRON) IVPB  20 mg Intravenous Daily    remdesivir IVPB  100 mg Intravenous Q24H    furosemide  40 mg Intravenous Once    sodium chloride  20 mL Intravenous Once    vitamin C  500 mg Oral Daily    busPIRone  5 mg Oral BID    calcium-vitamin D  1 tablet Oral BID    folic acid  1 mg Oral Daily    ibuprofen  800 mg Oral TID WC    loperamide  2 mg Oral Q12H    therapeutic multivitamin-minerals  1 tablet Oral Daily    pantoprazole  40 mg Oral Daily    promethazine  25 mg Oral Q12H    QUEtiapine  25 mg Oral Nightly    vitamin B-12  500 mcg Oral Daily        dexmedetomidine Stopped (11/12/20 7497)       LORazepam, guaiFENesin-dextromethorphan, HYDROcodone-acetaminophen      Problem list:       Patient Active Problem List   Diagnosis Code    Vitamin B12 deficiency E53.8    Intestinal malabsorption following gastrectomy K91.2, Z90.3    KETAN (iron deficiency anemia) D50.9    Copper deficiency myeloneuropathy ([address] 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 Although every effort was made to ensure the accuracy of this automated transcription, some errors in transcription may have occurred inadvertently. If you may need any clarification, please do not hesitate to contact me through EPIC or at the phone number provided below with my electronic signature. Any pictures or media included in this note were obtained after taking informed verbal consent from the patient and with their approval to include those in the patient's medical record.     Ernestine Frazier MD, MPH  11/12/2020 , 5:44 PM   [facility]   [address] [place] [address], [address] [place] [address]  Office: [phone]  Fax: [phone]  Clinic days:  Tuesday & Thursday

## 2020-11-12 NOTE — PLAN OF CARE
Nonviolent/Non-Self-Destructive Behavior:  Goal: Absence of restraint indications  Description: Absence of restraint indications  11/12/2020 0350 by Marv Cedeno RN  Outcome: Ongoing     Problem: Restraint Use - Nonviolent/Non-Self-Destructive Behavior:  Goal: Absence of restraint-related injury  Description: Absence of restraint-related injury  11/12/2020 0350 by Marv Cedeno RN  Outcome: Ongoing  Restraints in place for the safety of the patient, his lines, and his tubing. Passive ROM performed on all four extremities. Hourly checks. No evidence of pain or distress.

## 2020-11-12 NOTE — PROGRESS NOTES
Shift handoff and assessment complete. Patient is in droplet plus iso for +COVID-19. Proper PPE applied when entering room: gown, gloves, face mask, and PAPR. Nursing care clustered for the safety of the patient, RN, and rest of the unit.      A&Ox2; to person and time. Disoriented to place and situation. On Precedex for Bipap restlessness and compliance. See MAR for titration. Responds to voice or new confusion/agitation. RASS -1. Awakens with sustained eye opening and eye contact. Bipap settings: 12 / 6 at 100% and weaning down with goal to get back on AirVo for AM. To pass on to day-shift. Diminished breath sounds throughout. SB. No adventitious breath sounds. Abdomen soft and rounded with hypoactive bowel sounds in all four quadrants. +1 pitting edema in BUE. +4 pitting edema in BLE. Unstageable pressure ulcer on coccyx. See LDA for wound assessments and image. Scattered bruising. Skin otherwise pale, dry, and intact. Graf patent with yellow, hazy urine. Restraints in place for the safety of the patient, her lines, and her tubing.  Passive ROM performed on all four extremities. Will monitor closely.

## 2020-11-12 NOTE — PROGRESS NOTES
PULMONARY AND CRITICAL CARE MEDICINE PROGRESS NOTE    CONSULTING PHYSICIAN: Pranav Perrin MD    ADMISSION DATE: 11/9/2020  ADMISSION DIAGNOSIS: Sepsis ([facility] [A41.9]  Sepsis ([facility] [address] [A41.9]    REASON FOR CONSULT:   Chief Complaint   Patient presents with    Fever     Arrived by [facility] from [place] rt fever. Recent COVID positive dx.  99.7 oral.         DATE OF CONSULT: 11/9/2020    INTERVAL HISTORY: Patient remains critically ill. Overnight remained on BiPAP. This morning has been transitioned to high flow nasal cannula at 100% FiO2 and 16 to the minute. Unable to tolerate Precedex gtt. Received as needed IV Ativan. does have infected decubitus ulcer in the sacrococcygeal area. REVIEW OF SYSTEMS:     Unable to obtain as patient is currently sedated.     PAST MEDICAL HISTORY:   Past Medical History:   Diagnosis Date    Adult failure to thrive     Allergic     Anemia     related to gastric bypass, followed by Dr. [place] Hod[place]kins Calculus of kidney     CHF (congestive heart failure) ([facility]     Chronic neck pain     Copper deficiency     Depression     Hypertension     Pulmonary emboli (HCC)     Sciatica     Seizures ([facility] 3/2014    hypoglycemia and/or benzo withdrawal    Vitamin B12 deficiency neuropathy ([facility]        PAST SURGICAL HISTORY:   Past Surgical History:   Procedure Laterality Date    ABDOMINOPLASTY      CHOLECYSTECTOMY      GASTRIC BYPASS SURGERY      TUNNELED VENOUS PORT PLACEMENT Right 5/2/2014    Dr. Oswaldo Vinson HISTORY:   Social History     Tobacco Use    Smoking status: Former Smoker     Packs/day: 1.00     Types: Cigarettes    Smokeless tobacco: Never Used   Substance Use Topics    Alcohol use: No    Drug use: No       FAMILY HISTORY:   Family History   Problem Relation Age of Onset    Dementia Mother     High Blood Pressure Mother     Thyroid Cancer Mother     Heart Disease Father     Stroke Father     Heart Attack Father     Thyroid Cancer Maternal Grandmother     High Blood Pressure Maternal Grandmother     Heart Disease Maternal Grandmother     Lung Cancer Paternal Grandfather        MEDICATIONS:     No current facility-administered medications on file prior to encounter. Current Outpatient Medications on File Prior to Encounter   Medication Sig Dispense Refill    apixaban (ELIQUIS) 5 MG TABS tablet Take 5 mg by mouth 2 times daily      buprenorphine-naloxone (SUBOXONE) 8-2 MG FILM SL film Place 1 Film under the tongue every morning.  buprenorphine-naloxone (SUBOXONE) 2-0.5 MG FILM SL film Place 1 Film under the tongue every evening.       Melatonin 10 MG TABS Take 10 mg by mouth nightly as needed      QUEtiapine (SEROQUEL) 50 MG tablet Take 0.5 tablets by mouth nightly 60 tablet 3    folic acid (FOLVITE) 1 MG tablet Take 1 tablet by mouth daily 30 tablet 3    vitamin B-12 (CYANOCOBALAMIN) 500 MCG tablet Take 500 mcg by mouth daily      promethazine (PHENERGAN) 25 MG tablet Take 25 mg by mouth every 12 hours      loperamide (IMODIUM) 2 MG capsule Take 2 mg by mouth every 12 hours      ibuprofen (ADVIL;MOTRIN) 800 MG tablet Take 800 mg by mouth every 12 hours as needed for Pain       pantoprazole (PROTONIX) 40 MG tablet Take 40 mg by mouth daily      busPIRone (BUSPAR) 5 MG tablet Take 5 mg by mouth 2 times daily       Calcium Carb-Cholecalciferol (CALCIUM 600-D PO) Take 1 tablet by mouth 2 times daily       Multiple Vitamins-Minerals (THERAPEUTIC MULTIVITAMIN-MINERALS) tablet Take 1 tablet by mouth daily      Ascorbic Acid (VITAMIN C) 500 MG CAPS Take 500 mg by mouth daily 30 capsule 3        enoxaparin  1 mg/kg Subcutaneous BID    lidocaine-EPINEPHrine  20 mL Intradermal Once    metroNIDAZOLE  500 mg Intravenous Q8H    cefTRIAXone (ROCEPHIN) IV  2 g Intravenous Q24H    Sodium Hypochlorite   Irrigation BID    sodium chloride  20 mL Intravenous Once    dexamethasone (DECADRON) IVPB  20 mg Intravenous Daily    remdesivir IVPB  100 mg Intravenous Q24H    furosemide  40 mg Intravenous Once    sodium chloride  20 mL Intravenous Once    vitamin C  500 mg Oral Daily    busPIRone  5 mg Oral BID    calcium-vitamin D  1 tablet Oral BID    folic acid  1 mg Oral Daily    ibuprofen  800 mg Oral TID WC    loperamide  2 mg Oral Q12H    therapeutic multivitamin-minerals  1 tablet Oral Daily    pantoprazole  40 mg Oral Daily    promethazine  25 mg Oral Q12H    QUEtiapine  25 mg Oral Nightly    vitamin B-12  500 mcg Oral Daily      dexmedetomidine Stopped (11/12/20 9227)     LORazepam, guaiFENesin-dextromethorphan, HYDROcodone-acetaminophen      ALLERGIES:   Allergies as of 11/09/2020 - Review Complete 11/09/2020   Allergen Reaction Noted    Acetaminophen Other (See Comments) 03/08/2016    Codeine Hives and Itching 06/16/2013    Morphine Itching 03/22/2010    Penicillins Hives and Swelling 03/22/2010    Morphine and related Nausea And Vomiting 05/12/2015    Ondansetron hcl Itching and Rash 08/20/2014      OBJECTIVE:   height is 5' 7\" (1.702 m) and weight is 150 lb 9.6 oz (68.3 kg). Her temporal temperature is 96.9 °F (36.1 °C). Her blood pressure is 130/87 and her pulse is 83. Her respiration is 10 and oxygen saturation is 96%. No intake/output data recorded. PHYSICAL EXAM:          CONSTITUTIONAL: She is a 50y.o.-year-old who appears her stated age. She is sedated with IV Ativan  HEENT: PERRLA, EOMI. No scleral icterus. No thrush, atraumatic, normocephalic. NECK: Supple, without cervical or supraclavicular lymphadenopathy:  CARDIOVASCULAR: Deferred due to full PPE precautions  RESPIRATORY & CHEST: Deferred due to full PPE precautions  GASTROINTESTINAL & ABDOMEN: Soft, nontender,  non-distended, without hepatosplenomegaly. GENITOURINARY: Deferred. MUSCULOSKELETAL: No tenderness to palpation of the axial skeleton. There is no clubbing. No cyanosis. No edema of the lower extremities.    SKIN OF BODY: No rash or jaundice. PSYCHIATRIC EVALUATION: Unable to assess  HEMATOLOGIC/LYMPHATIC/ IMMUNOLOGIC: No palpable lymphadenopathy. NEUROLOGIC: Sedated and on BiPAP therapy. LABS:  Recent Labs     11/10/20  0437 11/11/20  0835   WBC 4.6 3.8*   HGB 10.1* 10.2*   HCT 33.0* 33.4*   * 158   ALT 31 27   AST 22 21    147*   K 3.7 3.6   CL 98* 99   CREATININE <0.5* <0.5*   BUN 7 6*   CO2 36* 45*       Recent Labs     11/10/20  0437 11/11/20  0835   GLUCOSE 163* 110*   CALCIUM 8.4 8.7    147*   K 3.7 3.6   CO2 36* 45*   CL 98* 99   BUN 7 6*   CREATININE <0.5* <0.5*       Recent Labs     11/10/20  2236 11/11/20  0551   PHART 7.323* 7.440   XQG8CHX 91.4* 68.2*   PO2ART 138.0* 67.7*   [id number] 47.4* 46.3*   [id number] 99.4 95.2   BEART 17.9* 19.2*       Lab Results   Component Value Date    INR 1.21 (H) 11/09/2020    INR 1.60 (H) 11/04/2020    INR 0.97 09/18/2020    PROTIME 14.1 (H) 11/09/2020    PROTIME 18.6 (H) 11/04/2020    PROTIME 11.2 09/18/2020     No results found for: AMYLASE   No results found for: LABA1C  No results found for: EAG  Lab Results   Component Value Date    TSH 1.14 09/20/2020     Lab Results   Component Value Date    CKTOTAL 25 (L) 11/04/2020    TROPONINI <0.01 11/09/2020      Lab Results   Component Value Date    CRP 22.8 (H) 11/11/2020      No results found for: BNP   Lab Results   Component Value Date    DDIMER 218 11/11/2020      Lab Results   Component Value Date    FERRITIN 160.6 02/02/2017      Lab Results   Component Value Date    LACTA 0.5 09/18/2020           IMAGING:    Chest x-ray portable 1 view done on 11/9/2020 was personally viewed by me and my interpretation is: Bibasilar atelectasis seen. No clear pulmonary infiltrates are visible. No pneumothorax, pleural effusion seen. Cardiac silhouette is within normal limits. IMPRESSION:     1. Acute hypoxic and hypercapnic respiratory failure, slowly improving  2. Recent diagnosis of COVID-19  3.  Functional quadriplegia  4. Opioid dependence  5. Anxiety/depression  6. Heart failure with preserved ejection fraction in mild exacerbation  7. Recent history of pulmonary embolism now on anticoagulation  8. Extensive decubitus ulcer    RECOMMENDATION:     1. Patient has presented to the hospital with generalized fatigue and found to have acute on chronic hypoxic and hypercapnic respiratory failure. 2. Hypoxic respiratory failure is most likely multifactorial with Covid infection, heart failure with preserved ejection fraction and exacerbation and bibasilar atelectasis contributing. 3. Hypercapnic respiratory failure possibly due to opioids. 4. Overnight was on BiPAP therapy. I transition to high flow nasal cannula this morning. Currently 100% FiO2. Saturating 96 to 97% on the monitor. 5. Titrate FiO2 to maintain SPO2 above 89%. 6. Keep a low threshold on placing the patient back on BiPAP therapy as needed. 7. General surgery to perform incision and drainage of the sacral decubitus ulcer. 8. In the meanwhile she continues on Rocephin and Flagyl to cover for E. coli isolation from the wound culture. 9. Patient to continue on Eliquis which will protect from recurrence of pulmonary embolism. 10. She has been started on IV Decadron 20 mg, IV Remdesivir and has been given 2 units of convalescent plasma. Total critical care time caring for this patient with life threatening illness, including direct patient contact, management of life support systems, review of data including imaging and labs, discussions with other team members and physicians is at least 31 minutes so far today, excluding procedures.         Chauncey Garza MD   Pulmonary Critical Care and Sleep Medicine  [facility]   [address], [place], [address]  11/9/2020, 4:32 PM            Chauncey Garza MD  Pulmonary Critical Care and Sleep Medicine  11/9/2020, 4:32 PM    This note was completed using dragon medical speech recognition software. Grammatical errors, random word insertions, pronoun errors and incomplete sentences are occasional consequences of this technology due to software limitations. If there are questions or concerns about the content of this note of information contained within the body of this dictation they should be addressed with the provider for clarification.

## 2020-11-12 NOTE — PROGRESS NOTES
ADVANCED CARE PLANNING    Chloe Reza       :  1975              MRN:  [id number]      Purpose of Encounter: Advanced care planning in light of problem listed above   Parties in attendance: :Hussein Shepherd MD, Family members:  Decisional Capacity:Yes    Diagnosis: Active Problems:    Vitamin B12 deficiency    Intestinal malabsorption following gastrectomy    Acute respiratory failure with hypoxemia (HCC)    Pulmonary embolus (HCC)    Enterococcus UTI    Functional quadriplegia (HCC)    Sepsis ([facility]    Sacral wound    Elevated C-reactive protein (CRP)    Thrombocytopenia (HCC)    Chronic anemia    History of pulmonary embolism    Seizure disorder ([facility] [address]    Ex-smoker    E coli infection  Resolved Problems:    * No resolved hospital problems. *    Patients Medical Story:Presented with worsening symptom of dx above. COVID-19 respiratory failure. With at risk for life threatening event. Procedure and testing as noted in progress noted. We discussed patient long term goal and also wishes and aggressive care. Discussed in detail about code status and what it means with detailed explanation. Goals of Care Determinations: Patient wishes to focus on full code with aggressive care, CPR, intubation long term vent and facility as well. Plan: Will notify Referring Not In System (Inactive) of change in care plan. Will look at further interventions as needed. Code Status: At this time patient wishes to be Full Code  Time Spent with Patient: 21 minutes      Electronically signed by Hussein Florence MD on 2020 at 10:43 AM  Thank you Referring Not In System (Inactive) for the opportunity to be involved in this patient's care.

## 2020-11-13 NOTE — PROGRESS NOTES
PULMONARY AND CRITICAL CARE MEDICINE PROGRESS NOTE    CONSULTING PHYSICIAN: Bennett Moyer MD    ADMISSION DATE: 11/9/2020  ADMISSION DIAGNOSIS: Sepsis ([facility] [address] [A41.9]  Sepsis ([facility] [address] [A41.9]    REASON FOR CONSULT:   Chief Complaint   Patient presents with    Fever     Arrived by [facility] from [place] rt fever. Recent COVID positive dx.  99.7 oral.         DATE OF CONSULT: 11/9/2020    INTERVAL HISTORY: Patient remains critically ill but making gradual progress. Overnight remained on BiPAP. This morning has been transitioned to high flow nasal cannula at 10 L/min. Received as needed IV Ativan. Does have infected decubitus ulcer in the sacrococcygeal area. REVIEW OF SYSTEMS:     Unable to obtain as patient is currently sedated.     PAST MEDICAL HISTORY:   Past Medical History:   Diagnosis Date    Adult failure to thrive     Allergic     Anemia     related to gastric bypass, followed by Dr. Kannan Acuna Calculus of kidney     CHF (congestive heart failure) ([facility] [address] [facility]     Chronic neck pain     Copper deficiency     Depression     Hypertension     Pulmonary emboli (HCC)     Sciatica     Seizures ([facility] [address] 3/2014    hypoglycemia and/or benzo withdrawal    Vitamin B12 deficiency neuropathy ([facility] [address]        PAST SURGICAL HISTORY:   Past Surgical History:   Procedure Laterality Date    ABDOMINOPLASTY      CHOLECYSTECTOMY      GASTRIC BYPASS SURGERY      TUNNELED VENOUS PORT PLACEMENT Right 5/2/2014    Dr. Domingo Jeffers HISTORY:   Social History     Tobacco Use    Smoking status: Former Smoker     Packs/day: 1.00     Types: Cigarettes    Smokeless tobacco: Never Used   Substance Use Topics    Alcohol use: No    Drug use: No       FAMILY HISTORY:   Family History   Problem Relation Age of Onset    Dementia Mother     High Blood Pressure Mother     Thyroid Cancer Mother     Heart Disease Father     Stroke Father     Heart Attack Father     Thyroid Cancer Maternal Grandmother Intravenous Once    dexamethasone (DECADRON) IVPB  20 mg Intravenous Daily    remdesivir IVPB  100 mg Intravenous Q24H    furosemide  40 mg Intravenous Once    sodium chloride  20 mL Intravenous Once    vitamin C  500 mg Oral Daily    busPIRone  5 mg Oral BID    calcium-vitamin D  1 tablet Oral BID    folic acid  1 mg Oral Daily    ibuprofen  800 mg Oral TID WC    loperamide  2 mg Oral Q12H    therapeutic multivitamin-minerals  1 tablet Oral Daily    promethazine  25 mg Oral Q12H    QUEtiapine  25 mg Oral Nightly    vitamin B-12  500 mcg Oral Daily      sodium chloride 75 mL/hr at 11/13/20 1131    dexmedetomidine Stopped (11/12/20 0447)     potassium chloride, LORazepam, guaiFENesin-dextromethorphan, HYDROcodone-acetaminophen      ALLERGIES:   Allergies as of 11/09/2020 - Review Complete 11/09/2020   Allergen Reaction Noted    Acetaminophen Other (See Comments) 03/08/2016    Codeine Hives and Itching 06/16/2013    Morphine Itching 03/22/2010    Penicillins Hives and Swelling 03/22/2010    Morphine and related Nausea And Vomiting 05/12/2015    Ondansetron hcl Itching and Rash 08/20/2014      OBJECTIVE:   height is 5' 7\" (1.702 m) and weight is 153 lb (69.4 kg). Her temporal temperature is 96.7 °F (35.9 °C). Her blood pressure is 126/90 (abnormal) and her pulse is 84. Her respiration is 20 and oxygen saturation is 100%. I/O this shift:  In: 125 [I.V.:125]  Out: -      PHYSICAL EXAM:          CONSTITUTIONAL: She is a 50y.o.-year-old who appears her stated age. She is somnolent but easily arousable. HEENT: PERRLA, EOMI. No scleral icterus. No thrush, atraumatic, normocephalic. NECK: Supple, without cervical or supraclavicular lymphadenopathy:  CARDIOVASCULAR: Deferred due to full PPE precautions  RESPIRATORY & CHEST: Deferred due to full PPE precautions  GASTROINTESTINAL & ABDOMEN: Soft, nontender,  non-distended, without hepatosplenomegaly. GENITOURINARY: Deferred.    MUSCULOSKELETAL: No tenderness to palpation of the axial skeleton. There is no clubbing. No cyanosis. No edema of the lower extremities. SKIN OF BODY: No rash or jaundice. PSYCHIATRIC EVALUATION: Unable to assess  HEMATOLOGIC/LYMPHATIC/ IMMUNOLOGIC: No palpable lymphadenopathy. NEUROLOGIC: Somnolent on BiPAP therapy. LABS:  Recent Labs     11/11/20  0835 11/13/20  0500   WBC 3.8* 6.4   HGB 10.2* 11.3*   HCT 33.4* 36.1    258   ALT 27 26   AST 21 24   * 151*   K 3.6 2.4*   CL 99 100   CREATININE <0.5* <0.5*   BUN 6* 8   CO2 45* 43*       Recent Labs     11/11/20  0835 11/13/20  0500   GLUCOSE 110* 89   CALCIUM 8.7 8.5   * 151*   K 3.6 2.4*   CO2 45* 43*   CL 99 100   BUN 6* 8   CREATININE <0.5* <0.5*       Recent Labs     11/10/20  2236 11/11/20  0551   PHART 7.323* 7.440   UET1VKU 91.4* 68.2*   PO2ART 138.0* 67.7*   [id number] 47.4* 46.3*   [id number] 99.4 95.2   BEART 17.9* 19.2*       Lab Results   Component Value Date    INR 1.21 (H) 11/09/2020    INR 1.60 (H) 11/04/2020    INR 0.97 09/18/2020    PROTIME 14.1 (H) 11/09/2020    PROTIME 18.6 (H) 11/04/2020    PROTIME 11.2 09/18/2020     No results found for: AMYLASE   No results found for: LABA1C  No results found for: EAG  Lab Results   Component Value Date    TSH 1.14 09/20/2020     Lab Results   Component Value Date    CKTOTAL 25 (L) 11/04/2020    TROPONINI <0.01 11/09/2020      Lab Results   Component Value Date    CRP 29.5 (H) 11/13/2020      No results found for: BNP   Lab Results   Component Value Date    DDIMER 300 (H) 11/13/2020      Lab Results   Component Value Date    FERRITIN 160.6 02/02/2017      Lab Results   Component Value Date    LACTA 0.5 09/18/2020           IMAGING:    Chest x-ray portable 1 view done on 11/9/2020 was personally viewed by me and my interpretation is: Bibasilar atelectasis seen. No clear pulmonary infiltrates are visible. No pneumothorax, pleural effusion seen.   Cardiac silhouette is within normal [address] [place] [address]  11/9/2020, 3:26 PM      This note was completed using dragon medical speech recognition software. Grammatical errors, random word insertions, pronoun errors and incomplete sentences are occasional consequences of this technology due to software limitations. If there are questions or concerns about the content of this note of information contained within the body of this dictation they should be addressed with the provider for clarification.

## 2020-11-13 NOTE — CARE COORDINATION
Chart reviewed for discharge planning. Barrier(s) to discharge-day 4 of remdesivir, high flow oxygen at 60 L/min  Tentative discharge plan- LTC at [facility]  Tentative discharge date- TBD    *Case management will continue to follow progress and update discharge plan as needed.     Jaylene Jackson MSW, [address]

## 2020-11-13 NOTE — PROGRESS NOTES
[facility] PROGRESS NOTE    11/13/2020 2:56 PM        Name: Nisa Vaughn . Admitted: 11/9/2020  Primary Care Provider: Referring Not In System (Inactive) (Tel: None)                        Subjective: Fortunato Rodrigez   She says her breathing is better, was able to tolerate lunch  Down to 10 L HiFlo this afternoon  Labs reviewed, Na 151, K 2.4, Mg 1.7  CRP level stable, D-dimer trending down    Current Medications  potassium chloride 20 mEq/50 mL IVPB (Central Line), PRN  magnesium sulfate 4 g in 100 mL IVPB premix, Once  0.45 % sodium chloride infusion, Continuous  famotidine (PEPCID) injection 20 mg, BID  LORazepam (ATIVAN) injection 0.5 mg, Q4H PRN  enoxaparin (LOVENOX) injection 70 mg, BID  lidocaine-EPINEPHrine 1 percent-1:[id number] injection 20 mL, Once  metronidazole (FLAGYL) 500 mg in NaCl 100 mL IVPB premix, Q8H  cefTRIAXone (ROCEPHIN) 2 g IVPB in D5W 50ml minibag, Q24H  Sodium Hypochlorite (DAKINS) 0.25 % external solution, BID  guaiFENesin-dextromethorphan (ROBITUSSIN DM) 100-10 MG/5ML syrup 10 mL, Q4H PRN  0.9 % sodium chloride bolus, Once  dexamethasone (DECADRON) 20 mg in sodium chloride 0.9 % IVPB, Daily  remdesivir 100 mg in sodium chloride 0.9 % 250 mL IVPB, Q24H  furosemide (LASIX) injection 40 mg, Once  0.9 % sodium chloride bolus, Once  dexmedetomidine (PRECEDEX) 400 mcg in sodium chloride 0.9 % 100 mL infusion, Continuous  vitamin C (ASCORBIC ACID) tablet 500 mg, Daily  busPIRone (BUSPAR) tablet 5 mg, BID  calcium-vitamin D 500-200 MG-UNIT per tablet 1 tablet, BID  folic acid (FOLVITE) tablet 1 mg, Daily  ibuprofen (ADVIL;MOTRIN) tablet 800 mg, TID WC  loperamide (IMODIUM) capsule 2 mg, Q12H  therapeutic multivitamin-minerals 1 tablet, Daily  promethazine (PHENERGAN) tablet 25 mg, Q12H  QUEtiapine (SEROQUEL) tablet 25 mg, Nightly  vitamin B-12 (CYANOCOBALAMIN) tablet 500 mcg, Daily  HYDROcodone-acetaminophen (NORCO) 7.5-325 MG per tablet 1 tablet, Q4H PRN        Objective:  BP (!) 126/90   Pulse 84   Temp 96.7 °F (35.9 °C) (Temporal)   Resp 20   Ht 5' 7\" (1.702 m)   Wt 153 lb (69.4 kg)   LMP 05/17/2014   SpO2 100%   BMI 23.96 kg/m²     Intake/Output Summary (Last 24 hours) at 11/13/2020 1456  Last data filed at 11/13/2020 1332  Gross per 24 hour   Intake 451.25 ml   Output 350 ml   Net 101.25 ml      Wt Readings from Last 3 Encounters:   11/13/20 153 lb (69.4 kg)   11/06/20 158 lb 11.7 oz (72 kg)   09/22/20 132 lb 8 oz (60.1 kg)       General appearance:  Appears comfortable  Eyes: Sclera clear. Pupils equal.  ENT: Moist oral mucosa. Trachea midline, no adenopathy. Cardiovascular: Regular rhythm, normal S1, S2. No murmur. No edema in lower extremities  Respiratory: rales bilaterally  GI: Abdomen soft, no tenderness, not distended, normal bowel sounds  Musculoskeletal: No cyanosis in digits, neck supple  Bilateral 1 + LE edema  Neurology: CN 2-12 grossly intact. No speech or motor deficits  Psych: Normal affect. Alert and oriented in time, place and person  Skin: Warm, dry, normal turgor    Labs and Tests:  CBC:   Recent Labs     11/11/20  0835 11/13/20  0500   WBC 3.8* 6.4   HGB 10.2* 11.3*    258     BMP:    Recent Labs     11/11/20  0835 11/13/20  0500   * 151*   K 3.6 2.4*   CL 99 100   CO2 45* 43*   BUN 6* 8   CREATININE <0.5* <0.5*   GLUCOSE 110* 89     Hepatic:   Recent Labs     11/11/20  0835 11/13/20  0500   AST 21 24   ALT 27 26   BILITOT 0.3 0.5   ALKPHOS 95 85         Assessment & Plan:      Active Hospital Problems    Diagnosis Date Noted    E coli infection [A49.8]     Elevated C-reactive protein (CRP) [R79.82]     Thrombocytopenia (HCC) [D69.6]     Chronic anemia [D64.9]     History of pulmonary embolism [Z86.711]     Seizure disorder ([facility] [G40.909]     Ex-smoker [Z87.891]     [facility]ri[facility] [R53.2] 11/09/2020    Sepsis ([facility] [A41.9] 11/09/2020    Sacral wound [S31.000A] 11/09/2020    Pulmonary embolus ([facility] [I26.99] 09/22/2020    Enterococcus UTI [N39.0, B95.2] 09/22/2020    Acute respiratory failure with hypoxemia (HCC) [J96.01] 09/18/2020    Vitamin B12 deficiency [E53.8] 05/12/2015    Intestinal malabsorption following gastrectomy [K91.2, Z90.3] 05/12/2015     Acute hypoxic respiratory failure   -secondary to COVID 19 infection with underlying wound infection  - pt was on 6l NC, got worse neeeding BiPaP and upto 60 L HiFlo, now improving.  -Patient was transferred to ICU for further evaluation  -Decadron convalescent plasma and remdesivir for now  -Pulmonary and critical care recommendation  -Anticoagulated on Xarelto which will help.  -Close monitoring ICU     Electrolyte abnormalities; anemia, hypomagnesemia  - Replace as needed intravenously  - Repeat Renal function test in the afternoon  - If K 3.2 or below give 60 mEq IV    Hypernatremia  Na 151 this am, Due to decreased PO intake, insensible volume losses. Renal panel daily, if worsening then will start on hypotonic fluids. Sacral decub ulcer stage 3 POA; she is bed bound  - with concerns for infection  - would culture growing Ecoli. -Patient will need debridement but due to worsening respiratory failure and hypoxia on BiPAP it has been delayed. -Tinea dressing changes per general surgery.     COVD 19 infection  -as noted above  - she was known positive on last discharge but now worsening respiratory status now     Acute metabolic  encephaloapthy - Resolved  - due to above       Diet: DIET GENERAL;  Code:Full Code  DVT PPX: Lovenox  Disposition: Continue ICU level of care, if continues to do better can transition to PCU on Saturday     Discussed with EVELIN Orozco MD   Hospitalist  11/13/2020 2:56 PM

## 2020-11-13 NOTE — FLOWSHEET NOTE
Pt placed in bilateral soft wrist restraints due to pulling off oxygen and desatting after multiple warnings & reminders to keep Airvo/pulse ox on. Order obtained per Dr Cristian Cartagena on call. Will give pt ativan prn during med pass for agitation. Will continue to monitor.

## 2020-11-13 NOTE — PROGRESS NOTES
Pt. With vasovagal after drinking cold water with ice, HR dropped to 21. Got Atropine and HR already up 55. Critical care MD to bedside.

## 2020-11-13 NOTE — PROGRESS NOTES
Infectious Diseases   Progress Note      Admission Date: 11/9/2020  Hospital Day: Hospital Day: 5   Attending: Suhail Hamilton MD  Date of service: 11/13/2020     Chief complaint/ Reason for consult:     · Acute respiratory failure with hypoxia  · Severe COVID-19 pneumonia  · Elevated CRP of 24.5 in setting of COVID-19  · Thrombocytopenia platelet count of 673 in setting of COVID-19  · Coccygeal wound infection with E. coli    Microbiology:        I have reviewed allavailable micro lab data and cultures    · Blood culture (2/2) - collected on 11/9/2020:  Negative so far   · Urine Legionella and pneumococcal antigens- collected on 11/9/2020: Negative  · COVID-19 PCR test :    SARS-CoV-2, PCR   COVID-19   Collected:  11/04/20 1410    Result status:  Final    Resulting lab:  [address] [place] [address] LAB    Reference range:  Not Detected    Value:  DETECTEDAbnormal       Comment: This test has been authorized by FDA under an   Emergency Use Authorization (EUA). · Coccygeal wound culture: Heavy growth of E.  Coli    Escherichia coli (1)     Antibiotic  Interpretation  KRZYSZTOF  Status     ampicillin  Resistant  >=32  mcg/mL      ceFAZolin  Sensitive  <=4  mcg/mL      cefepime  Sensitive  <=0.12  mcg/mL      cefTRIAXone  Sensitive  <=0.25  mcg/mL      ciprofloxacin  Resistant  >=4  mcg/mL      ertapenem  Sensitive  <=0.12  mcg/mL      gentamicin  Sensitive  <=1  mcg/mL      levofloxacin  Resistant  >=8  mcg/mL      piperacillin-tazobactam  Sensitive  <=4  mcg/mL      trimethoprim-sulfamethoxazole  Resistant  >=320  mcg/mL              Antibiotics and immunizations:       Current antibiotics: All antibiotics and their doses were reviewed by me    Recent Abx Admin                   metronidazole (FLAGYL) 500 mg in NaCl 100 mL IVPB premix (mg) 500 mg New Bag 11/13/20 1211     500 mg New Bag  0454     500 mg New Bag 11/12/20 2011    cefTRIAXone (ROCEPHIN) 2 g IVPB in D5W 50ml minibag (g) 2 g New Bag 11/13/20 1211 2 g New Bag 11/12/20 8884    remdesivir 100 mg in sodium chloride 0.9 % 250 mL IVPB (mg) 100 mg New Bag 11/12/20 5945                  Immunization History: All immunization history was reviewed by me today. There is no immunization history for the selected administration types on file for this patient. Known drug allergies: All allergies were reviewed and updated    Allergies   Allergen Reactions    Acetaminophen Other (See Comments)     Pt states \"I am not supposed to take it because of my liver syndrome\"    Codeine Hives and Itching    Morphine Itching    Penicillins Hives and Swelling     Facial swelling    Morphine And Related Nausea And Vomiting    Ondansetron Hcl Itching and Rash       Social history:     Social History:  All social andepidemiologic history was reviewed and updated by me today as needed. · Tobacco use:   reports that she has quit smoking. Her smoking use included cigarettes. She smoked 1.00 pack per day. She has never used smokeless tobacco.  · Alcohol use:   reports no history of alcohol use. · Currently lives in: [address] [place] [address] Dr Sharma  reports no history of drug use. Assessment:     The patient is a 50 y.o. old female who  has a past medical history of Adult failure to thrive, Allergic, Anemia, Anxiety, Calculus of kidney, CHF (congestive heart failure) ([address] Chronic neck pain, Copper deficiency, Depression, Hypertension, Pulmonary emboli ([address] Sciatica, Seizures ([address] (3/2014), and Vitamin B12 deficiency neuropathy ([address]  with following problems:    · Acute respiratory failure with hypoxia-currently on high flow currently on 10 L oxygen via high flow nasal cannula  · Severe COVID-19 pneumonia-remains on remdesivir and Decadron  · Elevated CRP-monitor trends  · Thrombocytopenia  -platelet count is 540,678 today  · Coccygeal wound infection with E. coli-covered with IV ceftriaxone  · A+ blood group  · Chronic anemia  · History of pulmonary as patient is in COVID-19 isolation   HENT: Negative for congestion, ear discharge, ear pain, facial swelling, hearing loss, rhinorrhea and trouble swallowing. Eyes: Negative for photophobia, discharge, redness and visual disturbance. Respiratory: Positive for cough and shortness of breath. Negative for apnea, choking and stridor. Cardiovascular: Negative for chest pain. Gastrointestinal: Negative for abdominal pain, blood in stool and nausea. Endocrine: Negative for cold intolerance and heat intolerance. Genitourinary: Negative for difficulty urinating, dysuria, frequency, hematuria, menstrual problem and vaginal discharge. Musculoskeletal: Positive for myalgias. Negative for joint swelling and neck stiffness. Skin: Negative for color change and rash. Allergic/Immunologic: Negative for immunocompromised state. Neurological: Negative for tremors, seizures and speech difficulty. Hematological: Negative for adenopathy. Psychiatric/Behavioral: Negative for agitation, hallucinations and suicidal ideas. All other systems reviewed and are negative. Past Medical History: All past medical history reviewed today. Past Medical History:   Diagnosis Date    Adult failure to thrive     Allergic     Anemia     related to gastric bypass, followed by Dr. Mei Lyman    Anxiety     Calculus of kidney     CH[facility]congestive [facility] failure) ([facility]     Chronic neck pain     Copper deficiency     Depression     Hypertension     Pulmonary emboli (HCC)     Sciatica     Seizures ([facility] 3/2014    hypoglycemia and/or benzo withdrawal    Vitamin B12 deficiency neuropathy (HCC)        Past Surgical History: All past surgical history was reviewed today. Past Surgical History:   Procedure Laterality Date    ABDOMINOPLASTY      CHOLECYSTECTOMY      GASTRIC BYPASS SURGERY      TUNNELED VENOUS PORT PLACEMENT Right 5/2/2014    Dr. Brittney Nguyen       Family History:  All family history was reviewed today.        Problem Relation Age of Onset    Dementia Mother     High Blood Pressure Mother     Thyroid Cancer Mother     Heart Disease Father     Stroke Father     Heart Attack Father     Thyroid Cancer Maternal Grandmother     High Blood Pressure Maternal Grandmother     Heart Disease Maternal Grandmother     Lung Cancer Paternal Grandfather        Objective:       PHYSICAL EXAM:      Vitals:   Vitals:    11/13/20 1014 11/13/20 1100 11/13/20 1200 11/13/20 1224   BP: (!) 155/93 118/88 (!) 126/90    Pulse: (!) 48 83 84    Resp: 19 20 20    Temp: 96.7 °F (35.9 °C)      TempSrc: Temporal      SpO2: 91% 99% 100% 100%   Weight:       Height:           Physical Exam       PHYSICAL EXAM:     In-person bedside physical examination deferred. Pursuant to the emergency declaration under the [address] [place] [address] and the [employer] Act, this clinical encounter was conducted to provide necessary medical care. (Also consistent with new provisions and guidance offered by [facility] on March 18, 2020 in setting of COVID 19 outbreak and in order to preserve personal protective equipment in accordance with the flexibilities announced by CMS on March 30, 2020)   References: [email or web address] [facility]/Portals/0/Resources/COVID-19/3_18%20Telemed%20Guidance%20Updated%20March%2018. pdf?[email or web address]                      https://[email or web address]. [facility]/Portals/0/Resources/COVID-19/3_18%20Telemed%20Guidance%20Updated%20March%2018. pdf?[email or web address]                      [email or web address]. pdf                            General: Awake and oriented to time place and person according to primary service, vitals reviewed  HEENT: Deferred  Cardiovascular: Telemetry data reviewed, rest deferred   Pulmonary: deferred  Abdomen/GI: deferred  Neuro: deferred  Skin: deferred  Musculoskeletal:  deferred  Genitourinary: Deferred  Psych: deferred  Lymphatic/Immunologic: deferred    Intake and output:    I/O last 3 completed shifts: In: 405 [I.V.:55; IV Piggyback:350]  Out: 850 [Urine:850]    Lab Data:   All available labs and old records have been reviewed by me. CBC:  Recent Labs     11/11/20  0835 11/13/20  0500   WBC 3.8* 6.4   RBC 3.38* 3.75*   HGB 10.2* 11.3*   HCT 33.4* 36.1    258   MCV 98.8 96.2   MCH 30.1 30.1   MCHC 30.5* 31.3   RDW 13.8 13.7        BMP:  Recent Labs     11/11/20  0835 11/13/20  0500   * 151*   K 3.6 2.4*   CL 99 100   CO2 45* 43*   BUN 6* 8   CREATININE <0.5* <0.5*   CALCIUM 8.7 8.5   GLUCOSE 110* 89        Hepatic Function Panel:   Lab Results   Component Value Date    ALKPHOS 85 11/13/2020    ALT 26 11/13/2020    AST 24 11/13/2020    PROT 4.8 11/13/2020    BILITOT 0.5 11/13/2020    LABALBU 2.4 11/13/2020       CPK:   Lab Results   Component Value Date    CKTOTAL 25 (L) 11/04/2020     ESR: No results found for: SEDRATE  CRP:   Lab Results   Component Value Date    CRP 22.8 (H) 11/11/2020           Imaging: All pertinent images and reports for the current visit were reviewed by me during this visit. XR CHEST PORTABLE   Final Result   Bilateral pleural effusions and bibasilar opacities persists suggesting   atelectasis or pneumonia         CT LUMBAR SPINE WO CONTRAST   Final Result   Unremarkable non-contrast CT of the lumbar spine. No discrete sacral bone erosion or destruction evident. Correlate with CT pelvis for description of soft tissues adjacent to the   sacrum which are incompletely included in the field of view on this exam.         CT PELVIS WO CONTRAST Additional Contrast? None   Final Result   Deep left posterior soft tissue ulcer extending within 2 mm of the proximal   coccyx. No well-defined bony erosions are seen to suggest acute   osteomyelitis.   There is fat stranding adjacent to the ulcer compatible with   cellulitis. Nonspecific prominent presacral fat stranding. Simple-appearing free fluid   along the right paracolic gutter. There is fat stranding extending from the skin to the level of the right   ischium compatible with cellulitis. Generalized subcutaneous edema about the pelvis and upper thighs. There is a small hyperdensity at the right bladder base. Unclear if this is   contrast from a recent CT or a true bladder lesion. This could be followed   up with CT or further assessed with a cystoscopy as clinically indicated. There is also air in the bladder. Correlate for recent intervention. CT HEAD WO CONTRAST   Final Result   Evaluation of the parenchyma, skull, and soft tissues at the vertex is motion   degraded. Otherwise, no acute intracranial abnormality. Bifrontal volume loss, greater than expected for age. Chronic sphenoid and ethmoid sinus mucosal disease. XR CHEST PORTABLE   Final Result   Small bilateral effusions and bibasilar airspace disease. Medications: All current and past medications were reviewed.      magnesium sulfate  4 g Intravenous Once    famotidine (PEPCID) injection  20 mg Intravenous BID    enoxaparin  1 mg/kg Subcutaneous BID    lidocaine-EPINEPHrine  20 mL Intradermal Once    metroNIDAZOLE  500 mg Intravenous Q8H    cefTRIAXone (ROCEPHIN) IV  2 g Intravenous Q24H    Sodium Hypochlorite   Irrigation BID    sodium chloride  20 mL Intravenous Once    dexamethasone (DECADRON) IVPB  20 mg Intravenous Daily    remdesivir IVPB  100 mg Intravenous Q24H    furosemide  40 mg Intravenous Once    sodium chloride  20 mL Intravenous Once    vitamin C  500 mg Oral Daily    busPIRone  5 mg Oral BID    calcium-vitamin D  1 tablet Oral BID    folic acid  1 mg Oral Daily    ibuprofen  800 mg Oral TID WC    loperamide  2 mg Oral Q12H    therapeutic multivitamin-minerals  1 tablet Oral Daily    promethazine  25 mg

## 2020-11-13 NOTE — PROGRESS NOTES
Pt. With vaso vagal HR drop to 36 after drinking cold cola drink with ice, second time today. HR back up immediately to 87 SR.

## 2020-11-15 NOTE — PROGRESS NOTES
PULMONARY AND CRITICAL CARE MEDICINE PROGRESS NOTE    CONSULTING PHYSICIAN: Korina Kimble MD    ADMISSION DATE: 11/9/2020  ADMISSION DIAGNOSIS: Sepsis ([facility] [A41.9]  Sepsis ([facility] [A41.9]    REASON FOR CONSULT:   Chief Complaint   Patient presents with    Fever     Arrived by [facility] from [place] rt fever. Recent COVID positive dx.  99.7 oral.         DATE OF CONSULT: 11/9/2020    INTERVAL HISTORY: Patient remains critically ill but making gradual progress. Intermittently on BiPAP and high flow nasal cannula. Continues on Precedex gtt. and as needed Ativan. Does have infected decubitus ulcer in the sacrococcygeal area. REVIEW OF SYSTEMS:     Unable to obtain as patient is currently sedated.     PAST MEDICAL HISTORY:   Past Medical History:   Diagnosis Date    Adult failure to thrive     Allergic     Anemia     related to gastric bypass, followed by Dr. Christine Anderson Calculus of kidney     CHF (congestive heart failure) ([facility]     Chronic neck pain     Copper deficiency     Depression     Hypertension     Pulmonary emboli (HCC)     Sciatica     Seizures ([facility] 3/2014    hypoglycemia and/or benzo withdrawal    Vitamin B12 deficiency neuropathy ([facility]        PAST SURGICAL HISTORY:   Past Surgical History:   Procedure Laterality Date    ABDOMINOPLASTY      CHOLECYSTECTOMY      GASTRIC BYPASS SURGERY      TUNNELED VENOUS PORT PLACEMENT Right 5/2/2014    Dr. Jasmin Vigil HISTORY:   Social History     Tobacco Use    Smoking status: Former Smoker     Packs/day: 1.00     Types: Cigarettes    Smokeless tobacco: Never Used   Substance Use Topics    Alcohol use: No    Drug use: No       FAMILY HISTORY:   Family History   Problem Relation Age of Onset    Dementia Mother     High Blood Pressure Mother     Thyroid Cancer Mother     Heart Disease Father     Stroke Father     Heart Attack Father     Thyroid Cancer Maternal Grandmother     High Blood Pressure Maternal Grandmother     Heart Disease Maternal Grandmother     Lung Cancer Paternal Grandfather        MEDICATIONS:     No current facility-administered medications on file prior to encounter. Current Outpatient Medications on File Prior to Encounter   Medication Sig Dispense Refill    apixaban (ELIQUIS) 5 MG TABS tablet Take 5 mg by mouth 2 times daily      buprenorphine-naloxone (SUBOXONE) 8-2 MG FILM SL film Place 1 Film under the tongue every morning.  buprenorphine-naloxone (SUBOXONE) 2-0.5 MG FILM SL film Place 1 Film under the tongue every evening.       Melatonin 10 MG TABS Take 10 mg by mouth nightly as needed      QUEtiapine (SEROQUEL) 50 MG tablet Take 0.5 tablets by mouth nightly 60 tablet 3    folic acid (FOLVITE) 1 MG tablet Take 1 tablet by mouth daily 30 tablet 3    vitamin B-12 (CYANOCOBALAMIN) 500 MCG tablet Take 500 mcg by mouth daily      promethazine (PHENERGAN) 25 MG tablet Take 25 mg by mouth every 12 hours      loperamide (IMODIUM) 2 MG capsule Take 2 mg by mouth every 12 hours      ibuprofen (ADVIL;MOTRIN) 800 MG tablet Take 800 mg by mouth every 12 hours as needed for Pain       pantoprazole (PROTONIX) 40 MG tablet Take 40 mg by mouth daily      busPIRone (BUSPAR) 5 MG tablet Take 5 mg by mouth 2 times daily       Calcium Carb-Cholecalciferol (CALCIUM 600-D PO) Take 1 tablet by mouth 2 times daily       Multiple Vitamins-Minerals (THERAPEUTIC MULTIVITAMIN-MINERALS) tablet Take 1 tablet by mouth daily      Ascorbic Acid (VITAMIN C) 500 MG CAPS Take 500 mg by mouth daily 30 capsule 3        [START ON 11/16/2020] dexamethasone (DECADRON) IVPB  10 mg Intravenous Daily    metroNIDAZOLE  500 mg Intravenous Q8H    famotidine (PEPCID) injection  20 mg Intravenous BID    enoxaparin  1 mg/kg Subcutaneous BID    lidocaine-EPINEPHrine  20 mL Intradermal Once    Sodium Hypochlorite   Irrigation BID    sodium chloride  20 mL Intravenous Once    furosemide  40 mg Intravenous Once  sodium chloride  20 mL Intravenous Once    vitamin C  500 mg Oral Daily    busPIRone  5 mg Oral BID    calcium-vitamin D  1 tablet Oral BID    folic acid  1 mg Oral Daily    ibuprofen  800 mg Oral TID WC    loperamide  2 mg Oral Q12H    therapeutic multivitamin-minerals  1 tablet Oral Daily    promethazine  25 mg Oral Q12H    QUEtiapine  25 mg Oral Nightly    vitamin B-12  500 mcg Oral Daily      dextrose 25 mL/hr at 11/15/20 1559     potassium chloride, LORazepam, guaiFENesin-dextromethorphan, HYDROcodone-acetaminophen      ALLERGIES:   Allergies as of 11/09/2020 - Review Complete 11/09/2020   Allergen Reaction Noted    Acetaminophen Other (See Comments) 03/08/2016    Codeine Hives and Itching 06/16/2013    Morphine Itching 03/22/2010    Penicillins Hives and Swelling 03/22/2010    Morphine and related Nausea And Vomiting 05/12/2015    Ondansetron hcl Itching and Rash 08/20/2014      OBJECTIVE:   height is 5' 7\" (1.702 m) and weight is 158 lb 3.2 oz (71.8 kg). Her temporal temperature is 97 °F (36.1 °C). Her blood pressure is 146/100 (abnormal) and her pulse is 73. Her respiration is 16 and oxygen saturation is 89% (abnormal). No intake/output data recorded. PHYSICAL EXAM:          CONSTITUTIONAL: She is a 50y.o.-year-old who appears her stated age. She is somnolent but easily arousable. HEENT: PERRLA, EOMI. No scleral icterus. No thrush, atraumatic, normocephalic. NECK: Supple, without cervical or supraclavicular lymphadenopathy:  CARDIOVASCULAR: Deferred due to full PPE precautions  RESPIRATORY & CHEST: Deferred due to full PPE precautions  GASTROINTESTINAL & ABDOMEN: Soft, nontender,  non-distended, without hepatosplenomegaly. GENITOURINARY: Deferred. MUSCULOSKELETAL: No tenderness to palpation of the axial skeleton. There is no clubbing. No cyanosis. No edema of the lower extremities. SKIN OF BODY: No rash or jaundice.    PSYCHIATRIC EVALUATION: Unable to assess  HEMATOLOGIC/LYMPHATIC/ IMMUNOLOGIC: No palpable lymphadenopathy. NEUROLOGIC: Somnolent on BiPAP therapy. LABS:  Recent Labs     11/13/20  0500 11/14/20 0210 11/14/20  0545 11/15/20  0500 11/15/20  1317   WBC 6.4  --   --  5.0 3.5*  --    HGB 11.3*  --   --  9.7* 9.8*  --    HCT 36.1  --   --  31.0* 32.2*  --      --   --  221 185  --    ALT 26  --   --  23 24  --    AST 24  --   --  22 20  --    *   < > 150* 150* 152* 139   K 2.4*   < > 3.7 3.7 3.6  --       < > 103 104 105  --    CREATININE <0.5*   < > <0.5* <0.5* <0.5*  --    BUN 8   < > 7 6* 6*  --    CO2 43*   < > 44* 46* 45*  --     < > = values in this interval not displayed. Recent Labs     11/14/20 0210 11/14/20 0545 11/15/20  0500 11/15/20  1317   GLUCOSE 135* 114* 109*  --    CALCIUM 7.9* 7.9* 8.4  --    * 150* 152* 139   K 3.7 3.7 3.6  --    CO2 44* 46* 45*  --     104 105  --    BUN 7 6* 6*  --    CREATININE <0.5* <0.5* <0.5*  --        No results for input(s): PHART, WVP5IHO, PO2ART, HKV3RHL, N7JZZDLR, BEART, F5LBOGMO in the last 72 hours.     Lab Results   Component Value Date    INR 1.21 (H) 11/09/2020    INR 1.60 (H) 11/04/2020    INR 0.97 09/18/2020    PROTIME 14.1 (H) 11/09/2020    PROTIME 18.6 (H) 11/04/2020    PROTIME 11.2 09/18/2020     No results found for: AMYLASE   No results found for: LABA1C  No results found for: EAG  Lab Results   Component Value Date    TSH 1.14 09/20/2020     Lab Results   Component Value Date    CKTOTAL 25 (L) 11/04/2020    TROPONINI <0.01 11/09/2020      Lab Results   Component Value Date    CRP 18.4 (H) 11/15/2020      No results found for: BNP   Lab Results   Component Value Date    DDIMER 237 (H) 11/15/2020      Lab Results   Component Value Date    FERRITIN 1,375.0 (H) 11/13/2020      Lab Results   Component Value Date    LACTA 0.5 09/18/2020           IMAGING:    Chest x-ray portable 1 view done on 11/9/2020 was personally viewed by me and my interpretation is: Bibasilar atelectasis seen. No clear pulmonary infiltrates are visible. No pneumothorax, pleural effusion seen. Cardiac silhouette is within normal limits. IMPRESSION:     1. Acute hypoxic and hypercapnic respiratory failure, slowly improving  2. Recent diagnosis of COVID-19  3. Functional quadriplegia  4. Opioid dependence  5. Anxiety/depression  6. Heart failure with preserved ejection fraction in mild exacerbation  7. Recent history of pulmonary embolism now on anticoagulation  8. Extensive decubitus ulcer    RECOMMENDATION:     1. Patient is suffering with acute on chronic hypoxic and hypercapnic respiratory failure which is slowly resolving. 2. Hypoxic respiratory failure is most likely multifactorial with Covid infection, heart failure with preserved ejection fraction and exacerbation and bibasilar atelectasis contributing. 3. Hypercapnic respiratory failure possibly due to opioids. 4. Overnight was on BiPAP therapy. Has been transitioned to high flow nasal cannula at 10 L/min. Saturating 99 to 100%. With intermittent agitation, leads to periodic desaturations. 5. Titrate FiO2 to maintain SPO2 above 89%. 6. Keep a low threshold on placing the patient back on BiPAP therapy as needed. 7. General surgery following for sacral decubitus ulcer. 8. In the meanwhile she continues on Rocephin and Flagyl to cover for E. coli isolation from the wound culture. We will continue for a total of 7 days. 9. We will change her Eliquis to subcutaneous Lovenox and therapeutic dosage as patient continues on BiPAP therapy. 10. She has been started on IV Decadron 20 mg, IV Remdesivir and has been given 2 units of convalescent plasma. We will decrease the dosage of IV Decadron to promote healing of the decubitus ulcers. 11. No more bradycardic episodes seen.             Chauncey Garza MD   Pulmonary Critical Care and Sleep Medicine  [facility] AT [facility], [place], [address]  11/9/2020, 8:19 PM      This note was completed using dragon medical speech recognition software. Grammatical errors, random word insertions, pronoun errors and incomplete sentences are occasional consequences of this technology due to software limitations. If there are questions or concerns about the content of this note of information contained within the body of this dictation they should be addressed with the provider for clarification.

## 2020-11-15 NOTE — PROGRESS NOTES
Maternal Grandmother     High Blood Pressure Maternal Grandmother     Heart Disease Maternal Grandmother     Lung Cancer Paternal Grandfather        MEDICATIONS:     No current facility-administered medications on file prior to encounter. Current Outpatient Medications on File Prior to Encounter   Medication Sig Dispense Refill    apixaban (ELIQUIS) 5 MG TABS tablet Take 5 mg by mouth 2 times daily      buprenorphine-naloxone (SUBOXONE) 8-2 MG FILM SL film Place 1 Film under the tongue every morning.  buprenorphine-naloxone (SUBOXONE) 2-0.5 MG FILM SL film Place 1 Film under the tongue every evening.       Melatonin 10 MG TABS Take 10 mg by mouth nightly as needed      QUEtiapine (SEROQUEL) 50 MG tablet Take 0.5 tablets by mouth nightly 60 tablet 3    folic acid (FOLVITE) 1 MG tablet Take 1 tablet by mouth daily 30 tablet 3    vitamin B-12 (CYANOCOBALAMIN) 500 MCG tablet Take 500 mcg by mouth daily      promethazine (PHENERGAN) 25 MG tablet Take 25 mg by mouth every 12 hours      loperamide (IMODIUM) 2 MG capsule Take 2 mg by mouth every 12 hours      ibuprofen (ADVIL;MOTRIN) 800 MG tablet Take 800 mg by mouth every 12 hours as needed for Pain       pantoprazole (PROTONIX) 40 MG tablet Take 40 mg by mouth daily      busPIRone (BUSPAR) 5 MG tablet Take 5 mg by mouth 2 times daily       Calcium Carb-Cholecalciferol (CALCIUM 600-D PO) Take 1 tablet by mouth 2 times daily       Multiple Vitamins-Minerals (THERAPEUTIC MULTIVITAMIN-MINERALS) tablet Take 1 tablet by mouth daily      Ascorbic Acid (VITAMIN C) 500 MG CAPS Take 500 mg by mouth daily 30 capsule 3        [START ON 11/16/2020] dexamethasone (DECADRON) IVPB  10 mg Intravenous Daily    metroNIDAZOLE  500 mg Intravenous Q8H    famotidine (PEPCID) injection  20 mg Intravenous BID    enoxaparin  1 mg/kg Subcutaneous BID    lidocaine-EPINEPHrine  20 mL Intradermal Once    Sodium Hypochlorite   Irrigation BID    sodium chloride  20 mL Intravenous Once    remdesivir IVPB  100 mg Intravenous Q24H    furosemide  40 mg Intravenous Once    sodium chloride  20 mL Intravenous Once    vitamin C  500 mg Oral Daily    busPIRone  5 mg Oral BID    calcium-vitamin D  1 tablet Oral BID    folic acid  1 mg Oral Daily    ibuprofen  800 mg Oral TID WC    loperamide  2 mg Oral Q12H    therapeutic multivitamin-minerals  1 tablet Oral Daily    promethazine  25 mg Oral Q12H    QUEtiapine  25 mg Oral Nightly    vitamin B-12  500 mcg Oral Daily      dextrose Stopped (11/15/20 1344)     potassium chloride, LORazepam, guaiFENesin-dextromethorphan, HYDROcodone-acetaminophen      ALLERGIES:   Allergies as of 11/09/2020 - Review Complete 11/09/2020   Allergen Reaction Noted    Acetaminophen Other (See Comments) 03/08/2016    Codeine Hives and Itching 06/16/2013    Morphine Itching 03/22/2010    Penicillins Hives and Swelling 03/22/2010    Morphine and related Nausea And Vomiting 05/12/2015    Ondansetron hcl Itching and Rash 08/20/2014      OBJECTIVE:   height is 5' 7\" (1.702 m) and weight is 158 lb 3.2 oz (71.8 kg). Her temporal temperature is 97.5 °F (36.4 °C). Her blood pressure is 155/97 (abnormal) and her pulse is 77. Her respiration is 20 and oxygen saturation is 100%. I/O this shift: In: 725 [I.V.:575; IV Piggyback:150]  Out: 850 [Urine:850]     PHYSICAL EXAM:          CONSTITUTIONAL: She is a 50y.o.-year-old who appears her stated age. She is somnolent but easily arousable. HEENT: PERRLA, EOMI. No scleral icterus. No thrush, atraumatic, normocephalic. NECK: Supple, without cervical or supraclavicular lymphadenopathy:  CARDIOVASCULAR: Deferred due to full PPE precautions  RESPIRATORY & CHEST: Deferred due to full PPE precautions  GASTROINTESTINAL & ABDOMEN: Soft, nontender,  non-distended, without hepatosplenomegaly. GENITOURINARY: Deferred. MUSCULOSKELETAL: No tenderness to palpation of the axial skeleton. There is no clubbing.  No cyanosis. No edema of the lower extremities. SKIN OF BODY: No rash or jaundice. PSYCHIATRIC EVALUATION: Unable to assess  HEMATOLOGIC/LYMPHATIC/ IMMUNOLOGIC: No palpable lymphadenopathy. NEUROLOGIC: Somnolent on BiPAP therapy. LABS:  Recent Labs     11/13/20 0500 11/14/20 0210 11/14/20  0545 11/15/20  0500 11/15/20  1317   WBC 6.4  --   --  5.0 3.5*  --    HGB 11.3*  --   --  9.7* 9.8*  --    HCT 36.1  --   --  31.0* 32.2*  --      --   --  221 185  --    ALT 26  --   --  23 24  --    AST 24  --   --  22 20  --    *   < > 150* 150* 152* 139   K 2.4*   < > 3.7 3.7 3.6  --       < > 103 104 105  --    CREATININE <0.5*   < > <0.5* <0.5* <0.5*  --    BUN 8   < > 7 6* 6*  --    CO2 43*   < > 44* 46* 45*  --     < > = values in this interval not displayed. Recent Labs     11/14/20 0210 11/14/20  0545 11/15/20  0500 11/15/20  1317   GLUCOSE 135* 114* 109*  --    CALCIUM 7.9* 7.9* 8.4  --    * 150* 152* 139   K 3.7 3.7 3.6  --    CO2 44* 46* 45*  --     104 105  --    BUN 7 6* 6*  --    CREATININE <0.5* <0.5* <0.5*  --        No results for input(s): PHART, IMT4CAR, PO2ART, TVE4PCL, F2HKTSUK, BEART, Y7YMFJBI in the last 72 hours.     Lab Results   Component Value Date    INR 1.21 (H) 11/09/2020    INR 1.60 (H) 11/04/2020    INR 0.97 09/18/2020    PROTIME 14.1 (H) 11/09/2020    PROTIME 18.6 (H) 11/04/2020    PROTIME 11.2 09/18/2020     No results found for: AMYLASE   No results found for: LABA1C  No results found for: EAG  Lab Results   Component Value Date    TSH 1.14 09/20/2020     Lab Results   Component Value Date    CKTOTAL 25 (L) 11/04/2020    TROPONINI <0.01 11/09/2020      Lab Results   Component Value Date    CRP 18.4 (H) 11/15/2020      No results found for: BNP   Lab Results   Component Value Date    DDIMER 237 (H) 11/15/2020      Lab Results   Component Value Date    FERRITIN 1,375.0 (H) 11/13/2020      Lab Results   Component Value Date    LACTA 0.5 09/18/2020 IMAGING:    Chest x-ray portable 1 view done on 11/9/2020 was personally viewed by me and my interpretation is: Bibasilar atelectasis seen. No clear pulmonary infiltrates are visible. No pneumothorax, pleural effusion seen. Cardiac silhouette is within normal limits. IMPRESSION:     1. Acute hypoxic and hypercapnic respiratory failure, slowly improving  2. Recent diagnosis of COVID-19  3. Functional quadriplegia  4. Opioid dependence  5. Anxiety/depression  6. Heart failure with preserved ejection fraction in mild exacerbation  7. Recent history of pulmonary embolism now on anticoagulation  8. Extensive decubitus ulcer    RECOMMENDATION:     1. Patient is suffering with acute on chronic hypoxic and hypercapnic respiratory failure which is slowly resolving  2. Hypoxic respiratory failure is most likely multifactorial with Covid infection, heart failure with preserved ejection fraction and exacerbation and bibasilar atelectasis contributing. 3. Hypercapnic respiratory failure possibly due to opioids. 4. Only on BiPAP therapy. Currently on high flow nasal cannula 100% FiO2 saturating mid 90s. 5. Titrate FiO2 to maintain SPO2 above 89%. 6. Keep a low threshold on placing the patient back on BiPAP therapy as needed. 7. General surgery following for sacral decubitus ulcer. 8. In the meanwhile she continues on Rocephin and Flagyl to cover for E. coli isolation from the wound culture. She has received a total of 7 days of antibiotics. At this time she is afebrile without leukocytosis. Will discontinue antibiotics. 9. We will change her Eliquis to subcutaneous Lovenox and therapeutic dosage as patient continues on BiPAP therapy. 10. Decreased her Decadron to 10 mg IV daily to promote healing of her decubitus ulcer. Continues on IV Remdesivir and has been given 2 units of convalescent plasma. 11. Seen to have hypernatremia.   Start patient on D5 water as hyponatremia is worsening on half-normal saline infusion. Repeat sodium levels in the afternoon. Total critical care time caring for this patient with life threatening illness, including direct patient contact, management of life support systems, review of data including imaging and labs, discussions with other team members and physicians is at least 32 minutes so far today, excluding procedures. Wu Bajwa MD   Pulmonary Critical Care and Sleep Medicine  [facility] AT [place]   [address] [address], [place], [address]  11/9/2020, 2:57 PM      This note was completed using dragon medical speech recognition software. Grammatical errors, random word insertions, pronoun errors and incomplete sentences are occasional consequences of this technology due to software limitations. If there are questions or concerns about the content of this note of information contained within the body of this dictation they should be addressed with the provider for clarification.

## 2020-11-15 NOTE — PROGRESS NOTES
kg)   LMP 05/17/2014   SpO2 100%   BMI 24.78 kg/m²     Intake/Output Summary (Last 24 hours) at 11/15/2020 1344  Last data filed at 11/15/2020 0400  Gross per 24 hour   Intake --   Output 1450 ml   Net -1450 ml      Wt Readings from Last 3 Encounters:   11/15/20 158 lb 3.2 oz (71.8 kg)   11/06/20 158 lb 11.7 oz (72 kg)   09/22/20 132 lb 8 oz (60.1 kg)     I performed an audiovisual assessment from outside the patients room, based on new provisions and guidance offered by [facility] on March 18, 2020 in setting of COVID-19 outbreak and in order to preserve personal protective equipment in accordance with the flexibilities announced by CMS on March 30, 2020. References:   [email or web address]/Resources/COVID-19/3_18%20Telemed%20Guidance%20Updated%20March%2018. [email or web address]   [email or web address] pdf      Bedside physical examination deferred. However, I did visually inspect the patient and observed the following:      General appearance: No apparent distress, appears stated age and cooperative. Neck: No visible masses or deformity  Respiratory:  Normal respiratory effort, on BiPAP  Cardiovascular: Deferred. Abdomen: Deferred. Musculoskelatal: No visible deformity  Neurologic:  Deferred. Psychiatric: Alert and oriented, thought content appropriate, normal insight  Skin: No visible rash.     Labs and Tests:  CBC:   Recent Labs     11/13/20  0500 11/14/20  0545 11/15/20  0500   WBC 6.4 5.0 3.5*   HGB 11.3* 9.7* 9.8*    221 185     BMP:    Recent Labs     11/14/20  0210 11/14/20  0545 11/15/20  0500 11/15/20  1317   * 150* 152* 139   K 3.7 3.7 3.6  --     104 105  --    CO2 44* 46* 45*  --    BUN 7 6* 6*  --    CREATININE <0.5* <0.5* <0.5*  --    GLUCOSE 135* 114* 109*  --      Hepatic:   Recent Labs     11/13/20  0500 11/14/20  0545 11/15/20  0500   AST 24 22 20   ALT 26 23 24   BILITOT 0.5 0.3 0.3 ALKPHOS 85 78 78         Assessment & Plan: Active Hospital Problems    Diagnosis Date Noted    E coli infection [A49.8]     Elevated C-reactive protein (CRP) [R79.82]     Thrombocytopenia (HCC) [D69.6]     Chronic anemia [D64.9]     History of pulmonary embolism [Z86.711]     Seizure disorder ([address] [G40.909]     Ex-smoker [Z87.891]     Functional quadriplegia ([address] [R53.2] 11/09/2020    Sepsis ([address] [A41.9] 11/09/2020    Sacral wound [S31.000A] 11/09/2020    Pulmonary embolus ([address] [I26.99] 09/22/2020    Enterococcus UTI [N39.0, B95.2] 09/22/2020    Acute respiratory failure with hypoxemia ([address] [J96.01] 09/18/2020    Vitamin B12 deficiency [E53.8] 05/12/2015    Intestinal malabsorption following gastrectomy [K91.2, Z90.3] 05/12/2015     Acute hypoxic respiratory failure   -secondary to COVID 19 infection with underlying wound infection  - Worsening again, on HIflo with intermittent BiPaP  - Decadron convalescent plasma and remdesivir for now  - Pulmonary and critical care recommendation  - Anticoagulated on Xarelto which will help. - Close monitoring ICU  - INFLAMMATORY MARKERS DOWN, PROCAL NEGATIVE, I DONT SUSPECT UNDERLYING INFECTION LEADING TO HYPOXEMIA. WILL REPEAT CXR FOR EVALUATION. PULMONARY FOLLOWING     Hypernatremia  Due to decreased PO intake, insensible volume losses. Renal panel daily  - continue hypotonic fluids  - She needs corpak/NG and water boluses  - CONSULT NEPHROLOGY FOR EVALUATION    Sacral decub ulcer stage 3 POA; she is bed bound  - with concerns for infection  - would culture growing Ecoli. -Patient will need debridement but due to worsening respiratory failure and hypoxia on BiPAP it has been delayed.   -dressing changes per general surgery  - She needs adequate nutrition    Acute metabolic  encephaloapthy - Resolved  - due to above     Anemia - hgb 9.8, stable, no apparent signs of bleeeding, possibly dilutional  CBC daily  Monitor for signs of bleeding    Severe

## 2020-11-16 NOTE — PROGRESS NOTES
Comprehensive Nutrition Assessment    Type and Reason for Visit:  Reassess    Nutrition Recommendations/Plan:   1. Regular diet with Ensure BID, as tolerated  2. If pt unable to tolerate PO diet, start Tube feeding (RD to make rec's as appropriate)    Nutrition Assessment:  Pt is nutritionally compromised AEB inability to stop BIPAP to consume meals, & pt has increased nutrient needs for stage 3 wound healing. If pt unable to tolerate oral intake, may need to start nutrition support. Malnutrition Assessment:  Malnutrition Status:  Mild malnutrition    Context:  Chronic Illness     Findings of the 6 clinical characteristics of malnutrition:  Energy Intake:  Mild decrease in energy intake (Comment)  Weight Loss:  Unable to assess     Body Fat Loss:  Unable to assess     Muscle Mass Loss:  Unable to assess    Fluid Accumulation:  7 - Severe Extremities, Generalized   Strength:  Not Performed    Estimated Daily Nutrient Needs:  Energy (kcal):  3101-4771 jill (25-30 cals/72kg); Weight Used for Energy Requirements:  (con't to use 72 kg for est needs)     Protein (g):   gms (1.3-1.5 gms/72kg); Weight Used for Protein Requirements:  Current        Fluid (ml/day):   ; Method Used for Fluid Requirements:  1 ml/kcal      Nutrition Related Findings:  Edema: +4 pitting BLE, +1 pitting BUE, pitting/generalized edema; + BS with LBM 11/16. Elevated Na+ 146, gluc 216; low k+ 2.9, BUN/creat, Mg is wnl. Wounds:  Stage III       Current Nutrition Therapies:    DIET GENERAL;  Dietary Nutrition Supplements: Low Calorie High Protein Supplement  · Goal TF & Flush Orders Provides: To be determined if appropriate      Anthropometric Measures:  · Height: 5' 7\" (170.2 cm)  · Current Body Weight: 161 lb (73 kg)   · Ideal Body Weight: 135 lbs; % Ideal Body Weight 119.3 %   · BMI: 25.2  · Adjusted Body Weight: 181.1; Quadraplegia   · Adjusted BMI: 28.4    · BMI Categories: Overweight (BMI 25.0-29. 9)       Nutrition Diagnosis:   · Increased nutrient needs related to increase demand for energy/nutrients as evidenced by wounds    · Inadequate energy intake related to impaired respiratory function as evidenced by intake 0-25%, poor intake prior to admission      Nutrition Interventions:   Food and/or Nutrient Delivery:  Continue Current Diet, Continue Oral Nutrition Supplement  Nutrition Education/Counseling:  Education not indicated   Coordination of Nutrition Care:  Continue to monitor while inpatient    Goals:  PO greater than 50% at meals/supp       Nutrition Monitoring and Evaluation:   Behavioral-Environmental Outcomes:  None Identified   Food/Nutrient Intake Outcomes:  Food and Nutrient Intake, Supplement Intake  Physical Signs/Symptoms Outcomes:  Skin, Weight, Other (Comment)(respiratory status)     Discharge Planning:     Too soon to determine     Electronically signed by Karishma Bender RD, LD on 11/16/20 at 12:36 PM EST    Contact: [phone]

## 2020-11-16 NOTE — PROGRESS NOTES
Hospitalist Progress Note      PCP: Referring Not In System (Inactive)    Date of Admission: 11/9/2020    Chief Complaint: Altered mental status and unresponsiveness    Hospital Course: 47-year-old female presented for altered mental status and the fever. Patient found to have COVID-19 pneumonia with peak oxygen requirement of 60 L of high flow nasal cannula. Patient also had infected decubitus ulcer. Patient was hypotensive and required ICU admission. Patient is currently hemodynamically stable without symptoms of sepsis. She continues to require continuous BiPAP in view of hypercapnia. She is receiving remdesivir and Decadron. Has history of recent PE on Lovenox. Subjective: Patient seen and examined. She is sleeping with BiPAP at home, easily arousable.       Medications:  Reviewed    Infusion Medications    dextrose 75 mL/hr at 11/16/20 1605    dextrose       Scheduled Medications    remdesivir IVPB  100 mg Intravenous Q24H    dexamethasone (DECADRON) IVPB  10 mg Intravenous Daily    famotidine (PEPCID) injection  20 mg Intravenous BID    enoxaparin  1 mg/kg Subcutaneous BID    lidocaine-EPINEPHrine  20 mL Intradermal Once    Sodium Hypochlorite   Irrigation BID    sodium chloride  20 mL Intravenous Once    furosemide  40 mg Intravenous Once    sodium chloride  20 mL Intravenous Once    vitamin C  500 mg Oral Daily    busPIRone  5 mg Oral BID    calcium-vitamin D  1 tablet Oral BID    folic acid  1 mg Oral Daily    ibuprofen  800 mg Oral TID WC    loperamide  2 mg Oral Q12H    therapeutic multivitamin-minerals  1 tablet Oral Daily    promethazine  25 mg Oral Q12H    QUEtiapine  25 mg Oral Nightly    vitamin B-12  500 mcg Oral Daily     PRN Meds: glucose, dextrose, glucagon (rDNA), dextrose, potassium chloride, LORazepam, guaiFENesin-dextromethorphan, HYDROcodone-acetaminophen      Intake/Output Summary (Last 24 hours) at 11/16/2020 1081  Last data filed at 11/16/2020 1406  Gross per 24 hour   Intake 700 ml   Output 3250 ml   Net -2550 ml       Physical Exam Performed:    BP (!) 144/99   Pulse 78   Temp 96.8 °F (36 °C) (Temporal)   Resp 17   Ht 5' 7\" (1.702 m)   Wt 161 lb 12.8 oz (73.4 kg)   LMP 05/17/2014   SpO2 90%   BMI 25.34 kg/m²     General appearance: No apparent distress, appears stated age and cooperative. HEENT: Pupils equal, round, and reactive to light. Conjunctivae/corneas clear. Neck: Supple, with full range of motion. No jugular venous distention. Trachea midline. Respiratory:  Normal respiratory effort. Clear to auscultation, bilaterally without Rales/Wheezes/Rhonchi. Cardiovascular: Regular rate and rhythm with normal S1/S2 without murmurs, rubs or gallops. Abdomen: Soft, non-tender, non-distended with normal bowel sounds. Musculoskeletal: No clubbing, cyanosis or edema bilaterally. Full range of motion without deformity. Skin: Skin color, texture, turgor normal.  No rashes or lesions. Neurologic:  Neurovascularly intact without any focal sensory/motor deficits. Cranial nerves: II-XII intact, grossly non-focal.  Psychiatric: Alert and oriented, thought content appropriate, normal insight  Capillary Refill: Brisk,< 3 seconds   Peripheral Pulses: +2 palpable, equal bilaterally       Labs:   Recent Labs     11/14/20  0545 11/15/20  0500 11/16/20  0430   WBC 5.0 3.5* 4.2   HGB 9.7* 9.8* 9.8*   HCT 31.0* 32.2* 31.7*    185 164     Recent Labs     11/13/20  1840  11/14/20  0545 11/15/20  0500 11/15/20  1317 11/16/20  0430 11/16/20  1443   *   < > 150* 152* 139 146* 150*   K 3.1*   < > 3.7 3.6  --  2.9* 4.2      < > 104 105  --  98*  --    CO2 43*   < > 46* 45*  --  48*  --    BUN 7   < > 6* 6*  --  4*  --    CREATININE <0.5*   < > <0.5* <0.5*  --  <0.5*  --    CALCIUM 8.2*   < > 7.9* 8.4  --  8.0*  --    PHOS 2.5  --   --   --   --   --   --     < > = values in this interval not displayed.      Recent Labs     11/14/20  0545 11/15/20  0500 11/16/20  0430   AST 22 20 36   ALT 23 24 27   BILITOT 0.3 0.3 0.4   ALKPHOS 78 78 72     No results for input(s): INR in the last 72 hours. No results for input(s): Jadene Moh in the last 72 hours. Urinalysis:      Lab Results   Component Value Date    NITRU Negative 11/11/2020    WBCUA 3 11/04/2020    BACTERIA 3+ 09/18/2020    RBCUA 7 11/04/2020    BLOODU Negative 11/11/2020    SPECGRAV 1.007 11/11/2020    GLUCOSEU Negative 11/11/2020       Radiology:  XR CHEST PORTABLE   Final Result   Left pleural effusion is reduced to moderate in size. There is no   pneumothorax. Left basilar opacity, likely atelectasis. Right perihilar opacity, likely atelectasis. Pneumonia is still differential concern. XR CHEST PORTABLE   Final Result   Complete opacification of the left hemithorax, may be related to atelectasis,   and large pleural effusion, markedly progressed since the prior study. Patchy airspace opacities in the right infrahilar region, may be related to   mild pulmonary edema versus pneumonia. XR CHEST PORTABLE   Final Result   Bilateral pleural effusions and bibasilar opacities persists suggesting   atelectasis or pneumonia         CT LUMBAR SPINE WO CONTRAST   Final Result   Unremarkable non-contrast CT of the lumbar spine. No discrete sacral bone erosion or destruction evident. Correlate with CT pelvis for description of soft tissues adjacent to the   sacrum which are incompletely included in the field of view on this exam.         CT PELVIS WO CONTRAST Additional Contrast? None   Final Result   Deep left posterior soft tissue ulcer extending within 2 mm of the proximal   coccyx. No well-defined bony erosions are seen to suggest acute   osteomyelitis. There is fat stranding adjacent to the ulcer compatible with   cellulitis. Nonspecific prominent presacral fat stranding. Simple-appearing free fluid   along the right paracolic gutter. There is fat stranding extending from the skin to the level of the right   ischium compatible with cellulitis. Generalized subcutaneous edema about the pelvis and upper thighs. There is a small hyperdensity at the right bladder base. Unclear if this is   contrast from a recent CT or a true bladder lesion. This could be followed   up with CT or further assessed with a cystoscopy as clinically indicated. There is also air in the bladder. Correlate for recent intervention. CT HEAD WO CONTRAST   Final Result   Evaluation of the parenchyma, skull, and soft tissues at the vertex is motion   degraded. Otherwise, no acute intracranial abnormality. Bifrontal volume loss, greater than expected for age. Chronic sphenoid and ethmoid sinus mucosal disease. XR CHEST PORTABLE   Final Result   Small bilateral effusions and bibasilar airspace disease. XR CHEST PORTABLE    (Results Pending)           Assessment/Plan:    Active Hospital Problems    Diagnosis    E coli infection [A49.8]    Elevated C-reactive protein (CRP) [R79.82]    Thrombocytopenia (HCC) [D69.6]    Chronic anemia [D64.9]    History of pulmonary embolism [Z86.711]    Seizure disorder ([address] [G40.909]    Ex-smoker [V71.057]    Functional quadriplegia ([address] [R53.2]    Sepsis ([address] [A41.9]    Sacral wound [S31.000A]    Pulmonary embolus ([address] [I26.99]    Enterococcus UTI [N39.0, B95.2]    Acute respiratory failure with hypoxemia (HCC) [J96.01]    Vitamin B12 deficiency [E53.8]    Intestinal malabsorption following gastrectomy [K91.2, Z90.3]     Acute hypoxic/hypercapnic respiratory failure  Secondary to COVID-19 pneumonia  Pulmonology following  Continue with BiPAP    Altered mental status  Secondary to hypercapnia  Hypernatremia?   Continuous BiPAP    Hypernatremia  On hypotonic fluids  Nephrology following    Sacral decubitus ulcer stage III  Wound culture grew E. coli  Would benefit from debridement, however, held back by respiratory status  General surgery following  ID on board, currently off antibiotics    DVT Prophylaxis: Lovenox  Diet: DIET GENERAL;  Dietary Nutrition Supplements: Low Calorie High Protein Supplement  Code Status: Full Code    Electronically signed by Toby De La Rosa MD on 11/16/2020 at 6:14 PM

## 2020-11-16 NOTE — PLAN OF CARE
Nutrition Problem #1: Increased nutrient needs  Intervention: Food and/or Nutrient Delivery: Continue Current Diet, Continue Oral Nutrition Supplement  Nutritional Goals: PO greater than 50% at meals/supp

## 2020-11-16 NOTE — PROGRESS NOTES
11/16/20 0815   NIV Type   $NIV $Daily Charge   Skin Assessment Redness (see comment/note)   Skin Protection for O2 Device Yes   Orientation Middle   Location Nose   Intervention(s) Skin Barrier   Equipment ID #[id number]   NIV Started/Stopped On   Equipment Type [id number]   Mode Bilevel   Mask Type Full face mask   Mask Size Medium   Settings/Measurements   IPAP 12 cmH20   CPAP/EPAP 6 cmH2O   Rate Ordered 12   Resp 12   Insp Rise Time (%) 1 %   FiO2  85 %  (decreased to 75%)   I Time/ I Time % 1 s   Vt Exhaled 259 mL   Minute Volume 5 Liters   Mask Leak (lpm) 3 lpm   Comfort Level Good   Using Accessory Muscles No   SpO2 100   Breath Sounds   Right Upper Lobe Diminished   Right Middle Lobe Diminished   Right Lower Lobe Diminished   Left Upper Lobe Diminished   Left Lower Lobe Diminished   Alarm Settings   Alarms On Y   Press Low Alarm 3 cmH2O   High Pressure Alarm 30 cmH2O   Delay Alarm 20 sec(s)   Apnea (secs) 20 secs   Resp Rate Low Alarm 13   High Respiratory Rate 40 br/min

## 2020-11-16 NOTE — CONSULTS
Office : [phone]     Fax :[phone]       Nephrology Consult Note      Patient's Name: Sheree Mendes  10:13 AM  11/16/2020    Reason for Consult: electrolyte disturbances       Requesting Physician:  Dr. Poornima Lee    Chief Complaint:    Chief Complaint   Patient presents with    Fever     Arrived by [facility] rt fever. Recent COVID positive dx.  99.7 oral.            History of Present Ilness:    Sheree Mendes is a 50 y.o. female with h/o CHF, HTN , PE, h/p Failure to thrive has been admitted with acute hypoxic respiratory failure with COVID 19 infection. She is not bale to give history so all history is from medical records   Currently on BiPAP  Initial sodium level was 152 which corrected rapidly to 139 . Now 146   Also, K is 2.9 today   UOP is good       I/O last 3 completed shifts:   In: 65 [I.V.:575; IV Piggyback:150]  Out: 1 [Urine:2750]    Past Medical History:   Diagnosis Date    Adult failure to thrive     Allergic     Anemia     related to gastric bypass, followed by Dr. Vika Martinez Calculus of kidney     CHF (congestive heart failure) ([address]     Chronic neck pain     Copper deficiency     Depression     Hypertension     Pulmonary emboli (HCC)     Sciatica     Seizures ([address] 3/2014    hypoglycemia and/or benzo withdrawal    Vitamin B12 deficiency neuropathy (HCC)        Past Surgical History:   Procedure Laterality Date    ABDOMINOPLASTY      CHOLECYSTECTOMY      GASTRIC BYPASS SURGERY      TUNNELED VENOUS PORT PLACEMENT Right 5/2/2014    Dr. Lisandro Alberts       Family History   Problem Relation Age of Onset    Dementia Mother     High Blood Pressure Mother     Thyroid Cancer Mother     Heart Disease Father     Stroke Father  Heart Attack Father     Thyroid Cancer Maternal Grandmother     High Blood Pressure Maternal Grandmother     Heart Disease Maternal Grandmother     Lung Cancer Paternal Grandfather         reports that she has quit smoking. Her smoking use included cigarettes. She smoked 1.00 pack per day. She has never used smokeless tobacco. She reports that she does not drink alcohol or use drugs.         Allergies:  Acetaminophen; Codeine; Morphine; Penicillins; Morphine and related; and Ondansetron hcl    Current Medications:    remdesivir 100 mg in sodium chloride 0.9 % 250 mL IVPB, Q24H  dexamethasone (DECADRON) 10 mg in sodium chloride 0.9 % IVPB, Daily  dextrose 5 % solution, Continuous  glucose (GLUTOSE) 40 % oral gel 15 g, PRN  dextrose 50 % IV solution, PRN  glucagon (rDNA) injection 1 mg, PRN  dextrose 5 % solution, PRN  potassium chloride 20 mEq/50 mL IVPB (Central Line), PRN  famotidine (PEPCID) injection 20 mg, BID  LORazepam (ATIVAN) injection 0.5 mg, Q4H PRN  enoxaparin (LOVENOX) injection 70 mg, BID  lidocaine-EPINEPHrine 1 percent-1:[id number] injection 20 mL, Once  Sodium Hypochlorite (DAKINS) 0.25 % external solution, BID  guaiFENesin-dextromethorphan (ROBITUSSIN DM) 100-10 MG/5ML syrup 10 mL, Q4H PRN  0.9 % sodium chloride bolus, Once  furosemide (LASIX) injection 40 mg, Once  0.9 % sodium chloride bolus, Once  vitamin C (ASCORBIC ACID) tablet 500 mg, Daily  busPIRone (BUSPAR) tablet 5 mg, BID  calcium-vitamin D 500-200 MG-UNIT per tablet 1 tablet, BID  folic acid (FOLVITE) tablet 1 mg, Daily  ibuprofen (ADVIL;MOTRIN) tablet 800 mg, TID WC  loperamide (IMODIUM) capsule 2 mg, Q12H  therapeutic multivitamin-minerals 1 tablet, Daily  promethazine (PHENERGAN) tablet 25 mg, Q12H  QUEtiapine (SEROQUEL) tablet 25 mg, Nightly  vitamin B-12 (CYANOCOBALAMIN) tablet 500 mcg, Daily  HYDROcodone-acetaminophen (NORCO) 7.5-325 MG per tablet 1 tablet, Q4H PRN        Review of Systems:   Could not be obtained       Physical exam:     Vitals:  BP (!) 137/95   Pulse 69   Temp 97 °F (36.1 °C) (Temporal)   Resp 12   Ht 5' 7\" (1.702 m)   Wt 161 lb 12.8 oz (73.4 kg)   LMP 05/17/2014   SpO2 100%   BMI 25.34 kg/m²   Constitutional:  On BiPAP, minimal response level   Skin: no rash, turgor wnl  Heent:  eomi, mmm  Neck: no bruits or jvd noted  Cardiovascular:  S1, S2 without m/r/g  Respiratory: CTA B without w/r/r  Abdomen:  +bs, soft, nt, nd  Ext: no  lower extremity edema  Psychiatric: cannot be assessed   Musculoskeletal: cannot be assessed   Labs:  CBC:   Recent Labs     11/14/20  0545 11/15/20  0500 11/16/20  0430   WBC 5.0 3.5* 4.2   HGB 9.7* 9.8* 9.8*    185 164     BMP:    Recent Labs     11/14/20  0545 11/15/20  0500 11/15/20  1317 11/16/20  0430   * 152* 139 146*   K 3.7 3.6  --  2.9*    105  --  98*   CO2 46* 45*  --  48*   BUN 6* 6*  --  4*   CREATININE <0.5* <0.5*  --  <0.5*   GLUCOSE 114* 109*  --  216*     Ca/Mg/Phos:   Recent Labs     11/13/20  1840  11/14/20  0545 11/15/20  0500 11/16/20  0430   CALCIUM 8.2*   < > 7.9* 8.4 8.0*   MG  --   --   --   --  1.90   PHOS 2.5  --   --   --   --     < > = values in this interval not displayed. Hepatic:   Recent Labs     11/14/20  0545 11/15/20  0500 11/16/20  0430   AST 22 20 36   ALT 23 24 27   BILITOT 0.3 0.3 0.4   ALKPHOS 78 78 72     Troponin: No results for input(s): TROPONINI in the last 72 hours. BNP: No results for input(s): BNP in the last 72 hours. Lipids: No results for input(s): CHOL, TRIG, HDL, LDLCALC, LABVLDL in the last 72 hours. ABGs: No results for input(s): PHART, PO2ART, ULB2YDX in the last 72 hours. INR: No results for input(s): INR in the last 72 hours. UA:No results for input(s): Ví[place] Grit, GLUCOSEU, BILIRUBINUR, [place], BLOODU, PHUR, PROTEINU, UROBILINOGEN, NITRU, LEUKOCYTESUR, LABMICR, URINETYPE in the last 72 hours.    Urine Microscopic: No results for input(s): LABCAST, BACTERIA, COMU, HYALCAST, WBCUA, RBCUA, EPIU in the last 72 hours. Urine Culture: No results for input(s): LABURIN in the last 72 hours. Urine Chemistry: No results for input(s): [place], PROTEINUR, NAUR in the last 72 hours. IMAGING:  XR CHEST PORTABLE   Final Result   Complete opacification of the left hemithorax, may be related to atelectasis,   and large pleural effusion, markedly progressed since the prior study. Patchy airspace opacities in the right infrahilar region, may be related to   mild pulmonary edema versus pneumonia. XR CHEST PORTABLE   Final Result   Bilateral pleural effusions and bibasilar opacities persists suggesting   atelectasis or pneumonia         CT LUMBAR SPINE WO CONTRAST   Final Result   Unremarkable non-contrast CT of the lumbar spine. No discrete sacral bone erosion or destruction evident. Correlate with CT pelvis for description of soft tissues adjacent to the   sacrum which are incompletely included in the field of view on this exam.         CT PELVIS WO CONTRAST Additional Contrast? None   Final Result   Deep left posterior soft tissue ulcer extending within 2 mm of the proximal   coccyx. No well-defined bony erosions are seen to suggest acute   osteomyelitis. There is fat stranding adjacent to the ulcer compatible with   cellulitis. Nonspecific prominent presacral fat stranding. Simple-appearing free fluid   along the right paracolic gutter. There is fat stranding extending from the skin to the level of the right   ischium compatible with cellulitis. Generalized subcutaneous edema about the pelvis and upper thighs. There is a small hyperdensity at the right bladder base. Unclear if this is   contrast from a recent CT or a true bladder lesion. This could be followed   up with CT or further assessed with a cystoscopy as clinically indicated. There is also air in the bladder. Correlate for recent intervention.          CT HEAD WO CONTRAST   Final Result Evaluation of the parenchyma, skull, and soft tissues at the vertex is motion   degraded. Otherwise, no acute intracranial abnormality. Bifrontal volume loss, greater than expected for age. Chronic sphenoid and ethmoid sinus mucosal disease. XR CHEST PORTABLE   Final Result   Small bilateral effusions and bibasilar airspace disease. Assessment/Plan :      1. Hypernatremia   Improving   Decreased rate of fluid because of rapid decrease but now sodium 146   Will adjust fluid rate     2. BP stable     3. Hypokalemia - supplement iv     4. Stage 3 Decubitus ulcer  Wound care   abx     5. Acute hypoxic respiratory failure.  covid 19 +   Pulmonology also following   On BiPAP    Thank you for allowing us to participate in care of Sadia Agee         Electronically signed by: Iván Russell MD, 11/16/2020, 10:13 AM      [facility] [address]  Office : [phone]  Fax :[phone]

## 2020-11-16 NOTE — PROGRESS NOTES
Infectious Diseases   Progress Note      Admission Date: 11/9/2020  Hospital Day: Hospital Day: 8   Attending: Lea Vicente MD  Date of service: 11/16/2020     Chief complaint/ Reason for consult:     · Acute respiratory failure with hypoxia  · Severe COVID-19 pneumonia  · Elevated CRP of 24.5 in setting of COVID-19  · Thrombocytopenia platelet count of 868 in setting of COVID-19  · Coccygeal wound infection with E. coli    Microbiology:        I have reviewed allavailable micro lab data and cultures    · Blood culture (2/2) - collected on 11/9/2020:  Negative so far   · Urine Legionella and pneumococcal antigens- collected on 11/9/2020: Negative  · COVID-19 PCR test :    SARS-CoV-2, PCR   COVID-19   Collected:  11/04/20 1410    Result status:  Final    Resulting lab:  [address] [place] [address] LAB    Reference range:  Not Detected    Value:  DETECTEDAbnormal       Comment: This test has been authorized by FDA under an   Emergency Use Authorization (EUA). · Coccygeal wound culture: Heavy growth of E.  Coli    Escherichia coli (1)     Antibiotic  Interpretation  KRZYSZTOF  Status     ampicillin  Resistant  >=32  mcg/mL      ceFAZolin  Sensitive  <=4  mcg/mL      cefepime  Sensitive  <=0.12  mcg/mL      cefTRIAXone  Sensitive  <=0.25  mcg/mL      ciprofloxacin  Resistant  >=4  mcg/mL      ertapenem  Sensitive  <=0.12  mcg/mL      gentamicin  Sensitive  <=1  mcg/mL      levofloxacin  Resistant  >=8  mcg/mL      piperacillin-tazobactam  Sensitive  <=4  mcg/mL      trimethoprim-sulfamethoxazole  Resistant  >=320  mcg/mL              Antibiotics and immunizations:       Current antibiotics: All antibiotics and their doses were reviewed by me    Recent Abx Admin                   remdesivir 100 mg in sodium chloride 0.9 % 250 mL IVPB (mg) 100 mg New Bag 11/16/20 1236    metronidazole (FLAGYL) 500 mg in NaCl 100 mL IVPB premix (mg) 500 mg New Bag 11/15/20 2030    remdesivir 100 mg in sodium chloride 0.9 % 250 mL IVPB (mg) 100 mg New Bag 11/15/20 4349                  Immunization History: All immunization history was reviewed by me today. There is no immunization history for the selected administration types on file for this patient. Known drug allergies: All allergies were reviewed and updated    Allergies   Allergen Reactions    Acetaminophen Other (See Comments)     Pt states \"I am not supposed to take it because of my liver syndrome\"    Codeine Hives and Itching    Morphine Itching    Penicillins Hives and Swelling     Facial swelling    Morphine And Related Nausea And Vomiting    Ondansetron Hcl Itching and Rash       Social history:     Social History:  All social andepidemiologic history was reviewed and updated by me today as needed. · Tobacco use:   reports that she has quit smoking. Her smoking use included cigarettes. She smoked 1.00 pack per day. She has never used smokeless tobacco.  · Alcohol use:   reports no history of alcohol use. · Currently lives in: [address] [place] [address] Dr Sharma  reports no history of drug use. Assessment:     The patient is a 50 y.o. old female who  has a past medical history of Adult failure to thrive, Allergic, Anemia, Anxiety, Calculus of kidney, CHF (congestive heart failure) ([address] Chronic neck pain, Copper deficiency, Depression, Hypertension, Pulmonary emboli ([address] Sciatica, Seizures ([address] (3/2014), and Vitamin B12 deficiency neuropathy ([address] with following problems:    · Acute respiratory failure with hypoxia-currently on BiPAP  · Severe COVID-19 pneumonia-remains on remdesivir and Decadron  · Elevated CRP-monitor trends  · Thrombocytopenia  -resolved  · Coccygeal wound infection with E. coli-has received 7 days of gram-negative coverage.   · A+ blood group  · Chronic anemia  · History of pulmonary embolism  · Seizure disorder  · Ex smoker      Discussion:      The patient is on IV remdesivir for convalescent plasma and IV ceftriaxone and Flagyl for infected sacral decubitus ulcers. The patient remains on BiPAP. Today is day 7 of remdesivir. He is otherwise afebrile. Serum creatinine less than 0.5. CRP is 18.4, LDH is 165. Liver functions are okay. D-dimer is 4 5. Portable chest x-ray from yesterday reviewed. Shows complete dissipation of the left hemithorax concerning for possible aspiration. Patient also has a large left pleural effusion. Plan:     Diagnostic Workup:    · Continue to monitor LDH, D-dimer, CRP, procalcitonin every other day for now. · Continue to follow  fever curve, WBC count and blood cultures. · Follow up on blood counts, liver and renal function. · Continue to watch for any hyperglycemia, transaminitis, renal dysfunction, anemia, constipation issues while on remdesivir. · Will order nasal MRSA screening culture      Antimicrobials:    · Antibiotics have been stopped over the weekend. If starts having fever again, due to infected sacral diabetes ulcer, may consider starting back on ceftriaxone. · Will extend IV remdesivir 100 mg every 24 hour for total of 10 days given her high oxygen requirements  · Continue Decadron daily at a dose of 10 mg daily  · Encourage frequent change in postures and awake prone positioning as tolerated. · Cough and deep breathing exercises. · Anticoagulation per primary and pulmonary. · Continue to monitor vitals closely. · Continue supplemental oxygen/high flow/BiPAP as needed to oxygen saturation above 92%. · Maintain good glycemic control. · Continue to watch for new or worsening fever or productive cough or other signs of secondary bacterial infection. · Fall and aspiration precautions. · COVID-19 isolation precautions. · Continue close monitoring in the COVID-19 unit. Remains at high risk of complications.   · Discussed all above with RN       Drug Monitoring:    · Continue monitoring for antimicrobial agent toxicity as follows: CBC, CMP  · Continue to watch for following: new or worsening fever, new hypotension, hives, lip swelling and redness or purulence at vascular access sites. I/v access Management:    · Continue to monitor i.v access sites for erythema, induration, discharge or tenderness. · As always, continue efforts to minimize tubes/lines/drains as clinically appropriate to reduce chances of line associated infections. Level of complexity of visit: High     Risk of Complications/Morbidity: High     · Illness(es)/ Infection present that pose threat to life/bodily function. · There is potential for severe exacerbation of infection/side effects of treatment. · Therapy requires intensive monitoring for antimicrobial agent toxicity. Thank you for involving me in the care of your patient. I will continue to follow. If you have anyadditional questions, please do not hesitate to contact me. Subjective: Interval history: Interval history was obtained from chart review and RN. Patient is afebrile. Keeps on requiring BiPAP. Today is day 7 of IV remdesivir. REVIEW OF SYSTEMS:      Review of Systems   Constitutional: Positive for fatigue. Review of system obtained with help of RN as patient is in COVID-19 isolation   HENT: Negative for congestion, ear discharge, ear pain, facial swelling, hearing loss, rhinorrhea and trouble swallowing. Eyes: Negative for photophobia, discharge, redness and visual disturbance. Respiratory: Positive for cough and shortness of breath. Negative for apnea, choking and stridor. Cardiovascular: Negative for chest pain. Gastrointestinal: Negative for abdominal pain, blood in stool and nausea. Endocrine: Negative for cold intolerance and heat intolerance. Genitourinary: Negative for difficulty urinating, dysuria, frequency, hematuria, menstrual problem and vaginal discharge. Musculoskeletal: Positive for myalgias. Negative for joint swelling and neck stiffness. Skin: Negative for color change and rash. Allergic/Immunologic: Negative for immunocompromised state. Neurological: Negative for tremors, seizures and speech difficulty. Hematological: Negative for adenopathy. Psychiatric/Behavioral: Negative for agitation, hallucinations and suicidal ideas. All other systems reviewed and are negative. Past Medical History: All past medical history reviewed today. Past Medical History:   Diagnosis Date    Adult failure to thrive     Allergic     Anemia     related to gastric bypass, followed by Dr. Mei Lyman    Anxiety     Calculus of kidney     [facility]     Chronic neck pain     Copper deficiency     Depression     Hypertension     Pulmonary emboli (HCC)     Sciatica     Seizures ([facility] 3/2014    hypoglycemia and/or benzo withdrawal    Vitamin B12 deficiency neuropathy (HCC)        Past Surgical History: All past surgical history was reviewed today. Past Surgical History:   Procedure Laterality Date    ABDOMINOPLASTY      CHOLECYSTECTOMY      GASTRIC BYPASS SURGERY      TUNNELED VENOUS PORT PLACEMENT Right 5/2/2014    Dr. Brittney Nguyen       Family History: All family history was reviewed today. Problem Relation Age of Onset    Dementia Mother     High Blood Pressure Mother     Thyroid Cancer Mother     Heart Disease Father     Stroke Father     Heart Attack Father     Thyroid Cancer Maternal Grandmother     High Blood Pressure Maternal Grandmother     Heart Disease Maternal Grandmother     Lung Cancer Paternal Grandfather        Objective:       PHYSICAL EXAM:      Vitals:   Vitals:    11/16/20 1400 11/16/20 1432 11/16/20 1500 11/16/20 1600   BP: (!) 164/97  (!) 162/91 (!) 158/113   Pulse: (!) 45  (!) 48 61   Resp: 15 12 13 16   Temp:    96.8 °F (36 °C)   TempSrc:    Temporal   SpO2: 95% 94% 98% (!) 86%   Weight:       Height:           Physical Exam       PHYSICAL EXAM:     In-person bedside physical examination deferred.   Pursuant to the emergency declaration under the [facility], [address] [place] [address] authority and the [employer] Act, this clinical encounter was conducted to provide necessary medical care. (Also consistent with new provisions and guidance offered by [facility] on March 18, 2020 in setting of COVID 19 outbreak and in order to preserve personal protective equipment in accordance with the flexibilities announced by CMS on March 30, 2020)   References: https://[facility]. [facility]/Portals/0/Resources/COVID-19/3_18%20Telemed%20Guidance%20Updated%20March%2018. pdf?nxv=[email or web address]                      https://[facility]. [facility]/Portals/0/Resources/COVID-19/3_18%20Telemed%20Guidance%20Updated%20March%2018. pdf?xhu=[email or web address]                      http://[employer]/. pdf                            General: Awake and oriented to time place and person according to primary service, vitals reviewed  HEENT: Deferred  Cardiovascular: Telemetry data reviewed, rest deferred   Pulmonary: deferred  Abdomen/GI: deferred  Neuro: deferred  Skin: deferred  Musculoskeletal:  deferred  Genitourinary: Deferred  Psych: deferred  Lymphatic/Immunologic: deferred    Intake and output:    I/O last 3 completed shifts: In: 700 [I.V.:300; IV Piggyback:400]  Out: 0 [Urine:3250]    Lab Data:   All available labs and old records have been reviewed by me.     CBC:  Recent Labs     11/14/20 0545 11/15/20  0500 11/16/20  0430   WBC 5.0 3.5* 4.2   RBC 3.18* 3.23* 3.24*   HGB 9.7* 9.8* 9.8*   HCT 31.0* 32.2* 31.7*    185 164   MCV 97.4 99.5 97.8   MCH 30.3 30.2 30.1   MCHC 31.1 30.4* 30.8*   RDW 13.8 14.0 13.7        BMP:  Recent Labs     11/14/20  0545 11/15/20  0500 11/15/20  1317 11/16/20  0430 11/16/20  1443   * 152* 139 146* 150*   K 3.7 3.6  --  2.9* 4.2    105  --  98*  --    CO2 46* 45*  -- 48*  --    BUN 6* 6*  --  4*  --    CREATININE <0.5* <0.5*  --  <0.5*  --    CALCIUM 7.9* 8.4  --  8.0*  --    GLUCOSE 114* 109*  --  216*  --         Hepatic Function Panel:   Lab Results   Component Value Date    ALKPHOS 72 11/16/2020    ALT 27 11/16/2020    AST 36 11/16/2020    PROT 4.4 11/16/2020    BILITOT 0.4 11/16/2020    LABALBU 2.3 11/16/2020       CPK:   Lab Results   Component Value Date    CKTOTAL 25 (L) 11/04/2020     ESR: No results found for: SEDRATE  CRP:   Lab Results   Component Value Date    CRP 18.4 (H) 11/15/2020           Imaging: All pertinent images and reports for the current visit were reviewed by me during this visit. XR CHEST PORTABLE   Final Result   Complete opacification of the left hemithorax, may be related to atelectasis,   and large pleural effusion, markedly progressed since the prior study. Patchy airspace opacities in the right infrahilar region, may be related to   mild pulmonary edema versus pneumonia. XR CHEST PORTABLE   Final Result   Bilateral pleural effusions and bibasilar opacities persists suggesting   atelectasis or pneumonia         CT LUMBAR SPINE WO CONTRAST   Final Result   Unremarkable non-contrast CT of the lumbar spine. No discrete sacral bone erosion or destruction evident. Correlate with CT pelvis for description of soft tissues adjacent to the   sacrum which are incompletely included in the field of view on this exam.         CT PELVIS WO CONTRAST Additional Contrast? None   Final Result   Deep left posterior soft tissue ulcer extending within 2 mm of the proximal   coccyx. No well-defined bony erosions are seen to suggest acute   osteomyelitis. There is fat stranding adjacent to the ulcer compatible with   cellulitis. Nonspecific prominent presacral fat stranding. Simple-appearing free fluid   along the right paracolic gutter.       There is fat stranding extending from the skin to the level of the right   ischium compatible with cellulitis. Generalized subcutaneous edema about the pelvis and upper thighs. There is a small hyperdensity at the right bladder base. Unclear if this is   contrast from a recent CT or a true bladder lesion. This could be followed   up with CT or further assessed with a cystoscopy as clinically indicated. There is also air in the bladder. Correlate for recent intervention. CT HEAD WO CONTRAST   Final Result   Evaluation of the parenchyma, skull, and soft tissues at the vertex is motion   degraded. Otherwise, no acute intracranial abnormality. Bifrontal volume loss, greater than expected for age. Chronic sphenoid and ethmoid sinus mucosal disease. XR CHEST PORTABLE   Final Result   Small bilateral effusions and bibasilar airspace disease. Medications: All current and past medications were reviewed.      remdesivir IVPB  100 mg Intravenous Q24H    dexamethasone (DECADRON) IVPB  10 mg Intravenous Daily    famotidine (PEPCID) injection  20 mg Intravenous BID    enoxaparin  1 mg/kg Subcutaneous BID    lidocaine-EPINEPHrine  20 mL Intradermal Once    Sodium Hypochlorite   Irrigation BID    sodium chloride  20 mL Intravenous Once    furosemide  40 mg Intravenous Once    sodium chloride  20 mL Intravenous Once    vitamin C  500 mg Oral Daily    busPIRone  5 mg Oral BID    calcium-vitamin D  1 tablet Oral BID    folic acid  1 mg Oral Daily    ibuprofen  800 mg Oral TID WC    loperamide  2 mg Oral Q12H    therapeutic multivitamin-minerals  1 tablet Oral Daily    promethazine  25 mg Oral Q12H    QUEtiapine  25 mg Oral Nightly    vitamin B-12  500 mcg Oral Daily        dextrose 75 mL/hr at 11/16/20 1605    dextrose         glucose, dextrose, glucagon (rDNA), dextrose, potassium chloride, LORazepam, guaiFENesin-dextromethorphan, HYDROcodone-acetaminophen      Problem list:       Patient Active Problem List   Diagnosis Code    Vitamin B12 deficiency E53.8    Intestinal malabsorption following gastrectomy K91.2, Z90.3    KETAN (iron deficiency anemia) D50.9    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 Although every effort was made to ensure the accuracy of this automated transcription, some errors in transcription may have occurred inadvertently. If you may need any clarification, please do not hesitate to contact me through EPIC or at the phone number provided below with my electronic signature. Any pictures or media included in this note were obtained after taking informed verbal consent from the patient and with their approval to include those in the patient's medical record.     Diana Zelaya MD, MPH  11/16/2020 , 4:56 PM   [facility] Infectious Disease   [address] [place] [address], [address] [place] [address]  Office: [phone]  Fax: [phone]  Clinic days:  Tuesday & Thursday

## 2020-11-17 NOTE — PROGRESS NOTES
vitamin B-12  500 mcg Oral Daily     PRN Meds: magnesium sulfate, glucose, dextrose, glucagon (rDNA), dextrose, potassium chloride, LORazepam, guaiFENesin-dextromethorphan, HYDROcodone-acetaminophen      Intake/Output Summary (Last 24 hours) at 11/17/2020 1706  Last data filed at 11/17/2020 1523  Gross per 24 hour   Intake 2887 ml   Output 5075 ml   Net -2188 ml       Physical Exam Performed:    BP 93/70   Pulse 65   Temp 96.9 °F (36.1 °C) (Temporal)   Resp 18   Ht 5' 7\" (1.702 m)   Wt 157 lb (71.2 kg)   LMP 05/17/2014   SpO2 100%   BMI 24.59 kg/m²     Physical Exam  Vitals signs reviewed. Constitutional:       General: She is not in acute distress. Appearance: She is not ill-appearing. Comments: Ventilated and sedated. HENT:      Head: Normocephalic and atraumatic. Mouth/Throat:      Comments: ETT in place. Eyes:      Comments: Pupillary response sluggish, corneal reflex appears to be absent. Cardiovascular:      Rate and Rhythm: Normal rate and regular rhythm. Pulses: Normal pulses. Pulmonary:      Comments: ETT to vent, transmitted lung sounds. Patient is not breathing over the vent. Abdominal:      General: Abdomen is flat. There is no distension. Musculoskeletal:      Right lower leg: No edema. Left lower leg: No edema. Skin:     General: Skin is warm and dry. Neurological:      Comments: Patient is not responding to any stimuli including sternal rub. Pupillary reflex appears to be sluggish. Corneal reflex appears to be absent. Does not trigger the event. Will need to be reevaluated once sedation is off.               Labs:   Recent Labs     11/15/20  0500 11/16/20  0430 11/17/20  0332   WBC 3.5* 4.2 15.0*   HGB 9.8* 9.8* 12.2   HCT 32.2* 31.7* 39.3    164 281     Recent Labs     11/16/20  0430 11/16/20  1443 11/17/20  0331 11/17/20  1220 11/17/20  1530   * 150* 149* 142 147*   K 2.9* 4.2 3.2* 3.7  --    CL 98*  --  98* 98*  --    CO2 48*  --  41* 39*  --    BUN 4*  --  5* 5*  --    CREATININE <0.5*  --  <0.5* <0.5*  --    CALCIUM 8.0*  --  8.8 8.1*  --      Recent Labs     11/15/20  0500 11/16/20  0430 11/17/20  0331   AST 20 36 59*   ALT 24 27 39   BILITOT 0.3 0.4 1.2*   ALKPHOS 78 72 85     No results for input(s): INR in the last 72 hours. No results for input(s): Les Magan in the last 72 hours. Urinalysis:      Lab Results   Component Value Date    NITRU Negative 11/11/2020    WBCUA 3 11/04/2020    BACTERIA 3+ 09/18/2020    RBCUA 7 11/04/2020    BLOODU Negative 11/11/2020    SPECGRAV 1.006 11/17/2020    GLUCOSEU Negative 11/11/2020       Radiology:  XR CHEST PORTABLE   Final Result   Satisfactory endotracheal tube placement. Enteric tube terminates in the stomach. The side hole is near the GE   junction. Advancement of 5-10 cm should improve function if to be used for   aspiration. Left pleural effusion with associated basilar airspace disease. XR CHEST PORTABLE   Final Result   Left pleural effusion is reduced to moderate in size. There is no   pneumothorax. Left basilar opacity, likely atelectasis. Right perihilar opacity, likely atelectasis. Pneumonia is still differential concern. XR CHEST PORTABLE   Final Result   Complete opacification of the left hemithorax, may be related to atelectasis,   and large pleural effusion, markedly progressed since the prior study. Patchy airspace opacities in the right infrahilar region, may be related to   mild pulmonary edema versus pneumonia. XR CHEST PORTABLE   Final Result   Bilateral pleural effusions and bibasilar opacities persists suggesting   atelectasis or pneumonia         CT LUMBAR SPINE WO CONTRAST   Final Result   Unremarkable non-contrast CT of the lumbar spine. No discrete sacral bone erosion or destruction evident.       Correlate with CT pelvis for description of soft tissues adjacent to the   sacrum which are incompletely included in the field of view on this exam.         CT PELVIS WO CONTRAST Additional Contrast? None   Final Result   Deep left posterior soft tissue ulcer extending within 2 mm of the proximal   coccyx. No well-defined bony erosions are seen to suggest acute   osteomyelitis. There is fat stranding adjacent to the ulcer compatible with   cellulitis. Nonspecific prominent presacral fat stranding. Simple-appearing free fluid   along the right paracolic gutter. There is fat stranding extending from the skin to the level of the right   ischium compatible with cellulitis. Generalized subcutaneous edema about the pelvis and upper thighs. There is a small hyperdensity at the right bladder base. Unclear if this is   contrast from a recent CT or a true bladder lesion. This could be followed   up with CT or further assessed with a cystoscopy as clinically indicated. There is also air in the bladder. Correlate for recent intervention. CT HEAD WO CONTRAST   Final Result   Evaluation of the parenchyma, skull, and soft tissues at the vertex is motion   degraded. Otherwise, no acute intracranial abnormality. Bifrontal volume loss, greater than expected for age. Chronic sphenoid and ethmoid sinus mucosal disease. XR CHEST PORTABLE   Final Result   Small bilateral effusions and bibasilar airspace disease.                  Assessment/Plan:    Active Hospital Problems    Diagnosis    E coli infection [A49.8]    Elevated C-reactive protein (CRP) [R79.82]    Thrombocytopenia (HCC) [D69.6]    Chronic anemia [D64.9]    History of pulmonary embolism [Z86.711]    Seizure disorder ([address] [V00.579]    Ex-smoker [I52.983]    Functional quadriplegia ([address] [R53.2]    Sepsis ([address] [A41.9]    Sacral wound [S31.000A]    Pulmonary embolus ([address] [I26.99]    Enterococcus UTI [N39.0, B95.2]    Acute respiratory failure with hypoxemia (HCC) [J96.01]    Vitamin B12 deficiency [E53.8]  Intestinal malabsorption following gastrectomy [K91.2, Z90.3]     Acute hypoxic/hypercapnic respiratory failure  Required intubation morning of 11/17/2020  Currently ventilated  Secondary to COVID-19 pneumonia  Pulmonology following  Continue with BiPAP    Altered mental status  Secondary to hypercapnia  Patient is deeply sedated  Neurological exam is concerning  We will need to be reevaluated once sedation is eased off    Hypernatremia  On hypotonic fluids  Nephrology following    Sacral decubitus ulcer stage III  Wound culture grew E. coli  Would benefit from debridement, however, held back by respiratory status  General surgery following  ID on board, currently off antibiotics    DVT Prophylaxis: Lovenox  Diet: DIET TUBE FEED CONTINUOUS/CYCLIC NPO; Low Calorie High Protein; Orogastric; 25; 45  Code Status: DNR-CCA    Electronically signed by Cady Barrera MD on 11/17/2020 at 5:06 PM

## 2020-11-17 NOTE — PROGRESS NOTES
Office : [phone]     Fax :[phone]       Nephrology  Progress Note    Reason for Consult: electrolyte disturbances         Chief Complaint:    Chief Complaint   Patient presents with    Fever     Arrived by [facility] from [place] rt fever. Recent COVID positive dx.  99.7 oral.            History of Present Ilness:    Ted Malhotra is a 50 y.o. female with h/o CHF, HTN , PE, h/p Failure to thrive has been admitted with acute hypoxic respiratory failure with COVID 19 infection. She is not bale to give history so all history is from medical records   Currently on BiPAP  Initial sodium level was 152 which corrected rapidly to 139 . Now 146   Also, K is 2.9 today   UOP is good     Interval hx     Intubated   On vent support now   UOP is good   Sodium level is better       I/O last 3 completed shifts:   In: 0 [I.V.:300; IV Piggyback:400]  Out: 5050 [Urine:5050]    Past Medical History:   Diagnosis Date    Adult failure to thrive     Allergic     Anemia     related to gastric bypass, followed by Dr. Brandt Mendoza    Anxiety     Calculus of kidney     CHF (congestive heart failure) ([address]     Chronic neck pain     Copper deficiency     Depression     Hypertension     Pulmonary emboli (HCC)     Sciatica     Seizures ([address] 3/2014    hypoglycemia and/or benzo withdrawal    Vitamin B12 deficiency neuropathy (HCC)        Past Surgical History:   Procedure Laterality Date    ABDOMINOPLASTY      CHOLECYSTECTOMY      GASTRIC BYPASS SURGERY      TUNNELED VENOUS PORT PLACEMENT Right 5/2/2014    Dr. Chrissy Caldwell       Family History   Problem Relation Age of Onset    Dementia Mother     High Blood Pressure Mother     Thyroid Cancer Mother     Heart Disease Father     Stroke Father  Heart Attack Father     Thyroid Cancer Maternal Grandmother     High Blood Pressure Maternal Grandmother     Heart Disease Maternal Grandmother     Lung Cancer Paternal Grandfather         reports that she has quit smoking. Her smoking use included cigarettes. She smoked 1.00 pack per day. She has never used smokeless tobacco. She reports that she does not drink alcohol or use drugs.         Allergies:  Acetaminophen; Codeine; Morphine; Penicillins; Morphine and related; and Ondansetron hcl    Current Medications:    magnesium sulfate 1 g in dextrose 5% 100 mL IVPB, PRN  norepinephrine (LEVOPHED) 16 mg in dextrose 5% 250 mL infusion, Continuous  remdesivir 100 mg in sodium chloride 0.9 % 250 mL IVPB, Q24H  propofol injection, Titrated  fentaNYL 10 mcg/mL infusion, Continuous  dexamethasone (DECADRON) 10 mg in sodium chloride 0.9 % IVPB, Daily  dextrose 5 % solution, Continuous  glucose (GLUTOSE) 40 % oral gel 15 g, PRN  dextrose 50 % IV solution, PRN  glucagon (rDNA) injection 1 mg, PRN  dextrose 5 % solution, PRN  potassium chloride 20 mEq/50 mL IVPB (Central Line), PRN  famotidine (PEPCID) injection 20 mg, BID  LORazepam (ATIVAN) injection 0.5 mg, Q4H PRN  enoxaparin (LOVENOX) injection 70 mg, BID  lidocaine-EPINEPHrine 1 percent-1:[id number] injection 20 mL, Once  Sodium Hypochlorite (DAKINS) 0.25 % external solution, BID  guaiFENesin-dextromethorphan (ROBITUSSIN DM) 100-10 MG/5ML syrup 10 mL, Q4H PRN  0.9 % sodium chloride bolus, Once  furosemide (LASIX) injection 40 mg, Once  0.9 % sodium chloride bolus, Once  vitamin C (ASCORBIC ACID) tablet 500 mg, Daily  busPIRone (BUSPAR) tablet 5 mg, BID  calcium-vitamin D 500-200 MG-UNIT per tablet 1 tablet, BID  folic acid (FOLVITE) tablet 1 mg, Daily  ibuprofen (ADVIL;MOTRIN) tablet 800 mg, TID WC  loperamide (IMODIUM) capsule 2 mg, Q12H  therapeutic multivitamin-minerals 1 tablet, Daily  promethazine (PHENERGAN) tablet 25 mg, Q12H  QUEtiapine (SEROQUEL) tablet 25 mg, Nightly  vitamin B-12 (CYANOCOBALAMIN) tablet 500 mcg, Daily  HYDROcodone-acetaminophen (NORCO) 7.5-325 MG per tablet 1 tablet, Q4H PRN        Physical exam:     Vitals:  /73   Pulse 63   Temp 97.5 °F (36.4 °C) (Temporal)   Resp 18   Ht 5' 7\" (1.702 m)   Wt 157 lb (71.2 kg)   LMP 05/17/2014   SpO2 96%   BMI 24.59 kg/m²   Constitutional:  Intubated   Skin: no rash, turgor wnl  Heent:  eomi, mmm  Neck: no bruits or jvd noted  Cardiovascular:  S1, S2 without m/r/g  Respiratory: CTA B without w/r/r  Abdomen:  +bs, soft, nt, nd  Ext: no  lower extremity edema      Labs:  CBC:   Recent Labs     11/15/20  0500 11/16/20  0430 11/17/20  0332   WBC 3.5* 4.2 15.0*   HGB 9.8* 9.8* 12.2    164 281     BMP:    Recent Labs     11/16/20  0430 11/16/20  1443 11/17/20  0331 11/17/20  1220   * 150* 149* 142   K 2.9* 4.2 3.2* 3.7   CL 98*  --  98* 98*   CO2 48*  --  41* 39*   BUN 4*  --  5* 5*   CREATININE <0.5*  --  <0.5* <0.5*   GLUCOSE 216*  --  233* 280*     Ca/Mg/Phos:   Recent Labs     11/16/20  0430 11/17/20  0331 11/17/20  1220   CALCIUM 8.0* 8.8 8.1*   MG 1.90 1.70*  --      Hepatic:   Recent Labs     11/15/20  0500 11/16/20  0430 11/17/20  0331   AST 20 36 59*   ALT 24 27 39   BILITOT 0.3 0.4 1.2*   ALKPHOS 78 72 85     Troponin: No results for input(s): TROPONINI in the last 72 hours. BNP: No results for input(s): BNP in the last 72 hours. Lipids: No results for input(s): CHOL, TRIG, HDL, LDLCALC, LABVLDL in the last 72 hours. ABGs:   Recent Labs     11/17/20  1150   PHART 7.607*   PO2ART 82.8   YDO9MCI 47.2*     INR: No results for input(s): INR in the last 72 hours. UA:  Recent Labs     11/17/20  1220   SPECGRAV 1.006      Urine Microscopic: No results for input(s): LABCAST, BACTERIA, COMU, HYALCAST, WBCUA, RBCUA, EPIU in the last 72 hours. Urine Culture: No results for input(s): LABURIN in the last 72 hours.   Urine Chemistry: No results for input(s): Minor [place] in the last 72 hours. IMAGING:  XR CHEST PORTABLE   Final Result   Satisfactory endotracheal tube placement. Enteric tube terminates in the stomach. The side hole is near the GE   junction. Advancement of 5-10 cm should improve function if to be used for   aspiration. Left pleural effusion with associated basilar airspace disease. XR CHEST PORTABLE   Final Result   Left pleural effusion is reduced to moderate in size. There is no   pneumothorax. Left basilar opacity, likely atelectasis. Right perihilar opacity, likely atelectasis. Pneumonia is still differential concern. XR CHEST PORTABLE   Final Result   Complete opacification of the left hemithorax, may be related to atelectasis,   and large pleural effusion, markedly progressed since the prior study. Patchy airspace opacities in the right infrahilar region, may be related to   mild pulmonary edema versus pneumonia. XR CHEST PORTABLE   Final Result   Bilateral pleural effusions and bibasilar opacities persists suggesting   atelectasis or pneumonia         CT LUMBAR SPINE WO CONTRAST   Final Result   Unremarkable non-contrast CT of the lumbar spine. No discrete sacral bone erosion or destruction evident. Correlate with CT pelvis for description of soft tissues adjacent to the   sacrum which are incompletely included in the field of view on this exam.         CT PELVIS WO CONTRAST Additional Contrast? None   Final Result   Deep left posterior soft tissue ulcer extending within 2 mm of the proximal   coccyx. No well-defined bony erosions are seen to suggest acute   osteomyelitis. There is fat stranding adjacent to the ulcer compatible with   cellulitis. Nonspecific prominent presacral fat stranding. Simple-appearing free fluid   along the right paracolic gutter. There is fat stranding extending from the skin to the level of the right   ischium compatible with cellulitis. Generalized subcutaneous edema about the pelvis and upper thighs. There is a small hyperdensity at the right bladder base. Unclear if this is   contrast from a recent CT or a true bladder lesion. This could be followed   up with CT or further assessed with a cystoscopy as clinically indicated. There is also air in the bladder. Correlate for recent intervention. CT HEAD WO CONTRAST   Final Result   Evaluation of the parenchyma, skull, and soft tissues at the vertex is motion   degraded. Otherwise, no acute intracranial abnormality. Bifrontal volume loss, greater than expected for age. Chronic sphenoid and ethmoid sinus mucosal disease. XR CHEST PORTABLE   Final Result   Small bilateral effusions and bibasilar airspace disease. Assessment/Plan :      1. Hypernatremia   Improving   Continue D5 W at current rate     2. BP stable     3. Hypokalemia - supplement iv     4. Stage 3 Decubitus ulcer  Wound care   abx     5. Acute hypoxic respiratory failure. covid 19 +   Had to be intubated   On vent support . 100 % fio2.        Thank you for allowing us to participate in care of Nimo Zarate         Electronically signed by: Thea Shukla MD, 11/17/2020, 2:56 PM      [facility] [address]  Office : [phone]  Fax :[phone]

## 2020-11-17 NOTE — PROGRESS NOTES
Nael Carrasquillo  AGE: 50 y.o. GENDER: female  : 1975  TODAY'S DATE:  2020    Chief Complaint   Patient presents with    Fever     Arrived by  EMS from [place] rt fever. Recent COVID positive dx.  99.7 oral.          HISTORY of PRESENT ILLNESS HPI     Nael Carrasquillo is a 50 y.o. female who presents today for wound evaluation. History of Wound: sacral pressure ulcer  Wound Pain:  mild  Severity:  0 / 10   Wound Type:  pressure  Modifying Factors:  none  Associated Signs/Symptoms:  none    Procedure Note    Performed by: Nancy Angeles MD    Consent obtained: Yes    Time out taken:  Yes    Pain Control:       Debridement:Excisional Debridement    Using forceps and #11 blade scalpel the wound was sharply debrided    down through and including the removal of muscle/fascia. Devitalized Tissue Debrided:  necrotic/eschar    Pre Debridement Measurements:  Are located in the Wound Documentation Flow Sheet    Wound #: 1     Post  Debridement Measurements:  Wound 20 Coccyx Mid (Active)   Wound Etiology Pressure Stage  3 20   Dressing Status Clean;Dry; Intact 20   Wound Cleansed Wound cleanser 20 1130   Dressing/Treatment Moist to dry 20   Dressing Change Due 20   Wound Length (cm) 7 cm 20   Wound Width (cm) 9 cm 20   Wound Surface Area (cm^2) 63 cm^2 20   Change in Wound Size % (l*w) 14.4 20   Wound Assessment Eschar dry;Pink/red;Slough 20   Drainage Amount Small 20   Drainage Description Sanguinous 20   Odor Mild 20   Stephanie-wound Assessment Excoriated;Non-blanchable erythema; Maceration 20   Margins Defined edges 20   Wound Thickness Description not for Pressure Injury Full thickness 20   Number of days: 12     Pre op measurements-8 x 6.5 x 2.5  Post op measurement 8 x 6.5 x 3.5        Total Surface Area Debrided: 58.5 sq cm     Bleeding:  Minimal    Hemostasis Achieved:  by pressure    Procedural Pain:  0  / 10     Post Procedural Pain:  0 / 10     Response to treatment:  Well tolerated by patient. The nature of the patient's condition was explained in depth. The patient was informed that their compliance to the treatment plan is paramount to successful healing and prevention of further ulceration and/or infection     Treatment Plan:   50year old female with Covid 19 who is currently sedated on the ventilator. Debridement was comfortable performed. The ulcer now extends down to bone and more superiorly  to the left side. No evidence of deep abscess or surrounding cellulitis. Will continue current dressing changes and anti-pressure measurements.      Tasia James M.D.  11/17/2020

## 2020-11-17 NOTE — PROGRESS NOTES
Referral from Palliative Care RN for prayer/support at mother's request.   Prayer for pt around mother's concerns. Spoke with mom, . Kellee Rubio; offered support and prayer.

## 2020-11-17 NOTE — PROGRESS NOTES
[facility] Pulmonary/CCM Progress note      Admit Date: 11/9/2020    Chief Complaint: Fever and shortness of breath    Subjective: Interval History: Patient was intubated overnight for worsening hypoxia. Now requiring norepinephrine drip. Severe respiratory alkalosis noted. Sodium 149, urine output of 5 L.     Scheduled Meds:   remdesivir IVPB  100 mg Intravenous Q24H    dexamethasone (DECADRON) IVPB  10 mg Intravenous Daily    famotidine (PEPCID) injection  20 mg Intravenous BID    enoxaparin  1 mg/kg Subcutaneous BID    lidocaine-EPINEPHrine  20 mL Intradermal Once    Sodium Hypochlorite   Irrigation BID    sodium chloride  20 mL Intravenous Once    furosemide  40 mg Intravenous Once    sodium chloride  20 mL Intravenous Once    vitamin C  500 mg Oral Daily    busPIRone  5 mg Oral BID    calcium-vitamin D  1 tablet Oral BID    folic acid  1 mg Oral Daily    ibuprofen  800 mg Oral TID WC    loperamide  2 mg Oral Q12H    therapeutic multivitamin-minerals  1 tablet Oral Daily    promethazine  25 mg Oral Q12H    QUEtiapine  25 mg Oral Nightly    vitamin B-12  500 mcg Oral Daily     Continuous Infusions:   norepinephrine 2 mcg/min (11/17/20 1120)    propofol 50 mcg/kg/min (11/17/20 1341)    fentaNYL 100 mcg/hr (11/17/20 1339)    dextrose 75 mL/hr at 11/16/20 2047    dextrose       PRN Meds:magnesium sulfate, glucose, dextrose, glucagon (rDNA), dextrose, potassium chloride, LORazepam, guaiFENesin-dextromethorphan, HYDROcodone-acetaminophen    Review of Systems  Unable to obtain since the patient is intubated, poorly responsive    Objective:     Patient Vitals for the past 8 hrs:   BP Temp Temp src Pulse Resp SpO2   11/17/20 1600 93/70 96.9 °F (36.1 °C) Temporal 65 18 100 %   11/17/20 1532 -- -- -- -- -- 100 %   11/17/20 1500 (!) 82/59 -- -- -- 18 97 %   11/17/20 1400 89/69 -- -- 58 17 100 %   11/17/20 1300 90/67 -- -- 58 18 100 %   11/17/20 1200 100/73 97.5 °F (36.4 °C) Temporal 63 18 96 % prior study.  There are    patchy airspace opacities in the right infrahilar region, may be related to    mild pulmonary edema versus pneumonia.   There is no pneumothorax.  The    osseous structures are stable.              Impression    Complete opacification of the left hemithorax, may be related to atelectasis,    and large pleural effusion, markedly progressed since the prior study.         Patchy airspace opacities in the right infrahilar region, may be related to    mild pulmonary edema versus pneumonia. Problem List:   Acute hypoxic/hypercapnic respiratory failure  Altered mental status  COVID-19 pneumonia/left pleural effusion  Opioid dependence  Decubitus ulcer    Assessment/Plan:     Acute hypoxic/hypercapnic respiratory failure-patient required intubation overnight. Severe respiratory alkalosis noted post intubation-decrease tidal volume and respiratory rate, repeat ABG. Chest x-ray shows persistent left pleural effusion-hold off on thoracentesis, doubt this would improve patient's hypoxia significantly. Altered mental status-possibly related to hypercapnia. CT head from 11/9 was negative for acute pathology. We will continue to monitor    Hypotension-switch phenylephrine to norepinephrine. Suspect hypotension is mostly related to mechanical ventilation and sedation needs. Check lactate. Hypernatremia-possibly related to poor nutrition/volume depletion. Sodium 149. Continue with slow IV fluids. Significant urine output of 5 L without diuretic use. Check urine sodium and osmolarity. Doubt diabetes insipidus. Continue remdesivir and Decadron for COVID-19 pneumonia. Recent PE on Lovenox. D-dimer 300. Start tube feeds today. Dirk Rodriguez MD     Critical care time of 32 minutes excluding procedures.

## 2020-11-17 NOTE — FLOWSHEET NOTE
Pt on 100% bipap at 2130 when she desatting without resolution. RT in room and noticed HR down to 30's. Once I came into the room hr resolved and RT bagging patient for hypoxia. Dr Grace Merino called to bedside and pt was orally intubated, pressor started, and ekg done.

## 2020-11-17 NOTE — PROGRESS NOTES
Nutrition Note      1. Recommend order \"Diet: Tube feed continuous/ NPO\". Initiate Vital High Protein (Low calorie high protein formula) at 25 mL/hr and as tolerated, increase by 10 mL/hr q 4 hours until goal of 45 mL/hr is met. 2. Recommend 75 mL H20 q 4 hours. Recommend reevaluate IV fluids at this time. Increase flush if Na increases greater than 145 mEq/L.  3. Monitor closely and correct lytes (K, Phos, Mg) before progressing TF to goal d/t risk of refeeding syndrome (hx extended inadequate nutritional intake). 4. Ensure head of bed is 30 - 45 degrees during continuous feeding. Turn off the feeding if head of bed is lowered less than 30 degrees. 5. Monitor for tolerance (abdominal distention, bowel habits, N/V, cramping). 6. Irrigate tube with 30 mL water before, between and after each medication. Vital High Protein @ 45 ml/hr provides 1080 kcals, 95 g pro & 902 ml free water. Unable to advance rate further d/t propofol providing 697 kcals of lipids, & D5 providing 306 kcals of dextrose. Energy (kcal):  8939-6061 jill (25-30 cals/72kg); Weight Used for Energy Requirements:  (con't to use 72 kg for est needs)     Protein (g):   gms (1.3-1.5 gms/72kg);  Weight Used for Protein Requirements:  Current        Fluid (ml/day):   ; Method Used for Fluid Requirements:  1 ml/kcal      Electronically signed by Lj Hartman RD, HENRIETTA on 11/17/20 at 11:25 AM EST    Contact: [phone]

## 2020-11-17 NOTE — PROGRESS NOTES
Rapid Response Quick Summary    Room: P5Y-6826/1462-59    Assessment of concern / patient:  AMS and hypoxic. Physician involved:  MD Nita Gould. Interventions:  Patient intubated. Disposition:  Remain on 5c. Pulm to be updated.

## 2020-11-17 NOTE — CARE COORDINATION
[facility] placed [facility] to verify candidacy of a [facility] admission. Nicolasa aware and waiting cllback. Case management will continue to follow progress and update discharge plan as needed.     SPENSER De LeonN, .[phone]

## 2020-11-17 NOTE — PROGRESS NOTES
Infectious Diseases   Progress Note      Admission Date: 11/9/2020  Hospital Day: Hospital Day: 9   Attending: Marco Antonio Acosta MD  Date of service: 11/17/2020     Chief complaint/ Reason for consult:     · Acute respiratory failure with hypoxia  · Severe COVID-19 pneumonia  · Elevated CRP of 24.5 in setting of COVID-19  · Thrombocytopenia platelet count of 917 in setting of COVID-19  · Coccygeal wound infection with E. coli    Microbiology:        I have reviewed allavailable micro lab data and cultures    · Blood culture (2/2) - collected on 11/9/2020:  Negative so far   · Urine Legionella and pneumococcal antigens- collected on 11/9/2020: Negative  · COVID-19 PCR test :    SARS-CoV-2, PCR   COVID-19   Collected:  11/04/20 1410    Result status:  Final    Resulting lab:  [address] [place] [address] LAB    Reference range:  Not Detected    Value:  DETECTEDAbnormal       Comment: This test has been authorized by FDA under an   Emergency Use Authorization (EUA). · Coccygeal wound culture: Heavy growth of E. Coli    Escherichia coli (1)     Antibiotic  Interpretation  KRZYSZTOF  Status     ampicillin  Resistant  >=32  mcg/mL      ceFAZolin  Sensitive  <=4  mcg/mL      cefepime  Sensitive  <=0.12  mcg/mL      cefTRIAXone  Sensitive  <=0.25  mcg/mL      ciprofloxacin  Resistant  >=4  mcg/mL      ertapenem  Sensitive  <=0.12  mcg/mL      gentamicin  Sensitive  <=1  mcg/mL      levofloxacin  Resistant  >=8  mcg/mL      piperacillin-tazobactam  Sensitive  <=4  mcg/mL      trimethoprim-sulfamethoxazole  Resistant  >=320  mcg/mL              Antibiotics and immunizations:       Current antibiotics: All antibiotics and their doses were reviewed by me    Recent Abx Admin                   remdesivir 100 mg in sodium chloride 0.9 % 250 mL IVPB (mg) 100 mg New Bag 11/17/20 1202                  Immunization History: All immunization history was reviewed by me today.     There is no immunization history for the selected administration types on file for this patient. Known drug allergies: All allergies were reviewed and updated    Allergies   Allergen Reactions    Acetaminophen Other (See Comments)     Pt states \"I am not supposed to take it because of my liver syndrome\"    Codeine Hives and Itching    Morphine Itching    Penicillins Hives and Swelling     Facial swelling    Morphine And Related Nausea And Vomiting    Ondansetron Hcl Itching and Rash       Social history:     Social History:  All social andepidemiologic history was reviewed and updated by me today as needed. · Tobacco use:   reports that she has quit smoking. Her smoking use included cigarettes. She smoked 1.00 pack per day. She has never used smokeless tobacco.  · Alcohol use:   reports no history of alcohol use. · Currently lives in: [facility]  ·  reports no history of drug use. Assessment:     The patient is a 50 y.o. old female who  has a past medical history of Adult failure to thrive, Allergic, Anemia, Anxiety, Calculus of kidney, CHF (congestive heart failure) ([address] Chronic neck pain, Copper deficiency, Depression, Hypertension, Pulmonary emboli ([address] Sciatica, Seizures ([address] (3/2014), and Vitamin B12 deficiency neuropathy ([facility] with following problems:    · Acute respiratory failure with hypoxia-has been intubated on 10/16/2020 night, now on ventilator  · Severe COVID-19 pneumonia-remains on remdesivir and Decadron  · Elevated CRP-monitor trends  · Thrombocytopenia  -resolved  · Coccygeal wound infection with E. coli-has received 7 days of gram-negative coverage. · A+ blood group  · Chronic anemia-hemoglobin stable  · History of pulmonary embolism  · Seizure disorder  · Ex smoker      Discussion:      The patient is on IV remdesivir for COVID-19. I had extended remdesivir course for total of 10 days given high oxygen requirement. Today is day 8 of IV remdesivir. White cell count is 15,000 today.     Last blood cultures from 11/9/2020 remain negative, the patient is otherwise afebrile. Serum creatinine 0.5. Chest x-ray from yesterday reviewed. Left-sided pleural effusion noted. The patient was intubated last night. Plan:     Diagnostic Workup:    · Continue to monitor LDH, D-dimer, CRP, procalcitonin every other day for now. · Continue to follow  fever curve, WBC count and blood cultures. · Follow up on blood counts, liver and renal function. · Continue to watch for any hyperglycemia, transaminitis, renal dysfunction, anemia, constipation issues while on remdesivir. · Will order 2 sets of blood cultures for thoroughness of work-up given rising white cell count  · We will order tracheal aspirate cultures  · We will order nasal MRSA screening culture      Antimicrobials:    · Will continue IV remdesivir 100 mg every 24 hours  · Continue Decadron daily at a dose of 10 mg daily  · Maintain good glycemic control while receiving Decadron  · Sacral decubitus ulcer site care. If starts having fever, may need to restart antibiotics  · Endotracheal tube care and pulmonary toileting  · Anticoagulation per primary and pulmonary. · Continue to monitor vitals closely. · Continue ventilator support to oxygen saturation above 92%. · Maintain good glycemic control. · Continue to watch for new or worsening fever or productive cough or other signs of secondary bacterial infection. · Fall and aspiration precautions. · COVID-19 isolation precautions. · Continue close monitoring in the COVID-19 unit. Remains at high risk of complications. · Discussed all above with RN       Drug Monitoring:    · Continue monitoring for antimicrobial agent toxicity as follows: CBC, CMP  · Continue to watch for following: new or worsening fever, new hypotension, hives, lip swelling and redness or purulence at vascular access sites.      I/v access Management:    · Continue to monitor i.v access sites for erythema, induration, discharge or tenderness. · As always, continue efforts to minimize tubes/lines/drains as clinically appropriate to reduce chances of line associated infections. Level of complexity of visit: High     Risk of Complications/Morbidity: High     · Illness(es)/ Infection present that pose threat to life/bodily function. · There is potential for severe exacerbation of infection/side effects of treatment. · Therapy requires intensive monitoring for antimicrobial agent toxicity. Thank you for involving me in the care of your patient. I will continue to follow. If you have anyadditional questions, please do not hesitate to contact me. Subjective: Interval history: Interval history was obtained from chart review and RN. Patient is afebrile. She was intubated last night. She remains intubated remains on ventilator. Has been hypotensive at times. REVIEW OF SYSTEMS:      Review of Systems   Unable to perform ROS: Intubated         Past Medical History: All past medical history reviewed today. Past Medical History:   Diagnosis Date    Adult failure to thrive     Allergic     Anemia     related to gastric bypass, followed by Dr. Brandt Mendoza    Anxiety     Calculus of kidney     [facility]) [facility]     Chronic neck pain     Copper deficiency     Depression     Hypertension     Pulmonary emboli (HCC)     Sciatica     Seizures ([facility] 3/2014    hypoglycemia and/or benzo withdrawal    Vitamin B12 deficiency neuropathy (HCC)        Past Surgical History: All past surgical history was reviewed today. Past Surgical History:   Procedure Laterality Date    ABDOMINOPLASTY      CHOLECYSTECTOMY      GASTRIC BYPASS SURGERY      TUNNELED VENOUS PORT PLACEMENT Right 5/2/2014    Dr. Chrissy Caldwell       Family History: All family history was reviewed today.         Problem Relation Age of Onset    Dementia Mother     High Blood Pressure Mother     Thyroid Cancer Mother     Heart Disease Father     Stroke Father     Heart Attack Father     Thyroid Cancer Maternal Grandmother     High Blood Pressure Maternal Grandmother     Heart Disease Maternal Grandmother     Lung Cancer Paternal Grandfather        Objective:       PHYSICAL EXAM:      Vitals:   Vitals:    11/17/20 1500 11/17/20 1532 11/17/20 1600 11/17/20 1700   BP: (!) 82/59  93/70 (!) 73/54   Pulse:   65 72   Resp: 18  18 16   Temp:   96.9 °F (36.1 °C)    TempSrc:   Temporal    SpO2: 97% 100% 100% 100%   Weight:       Height:           Physical Exam       PHYSICAL EXAM:     In-person bedside physical examination deferred. Pursuant to the emergency declaration under the [address] [place] [address] and the [employer] Act, this clinical encounter was conducted to provide necessary medical care. (Also consistent with new provisions and guidance offered by [facility] on March 18, 2020 in setting of COVID 19 outbreak and in order to preserve personal protective equipment in accordance with the flexibilities announced by CMS on March 30, 2020)   References: https://[facility]. [facility]/Portals/0/Resources/COVID-19/3_18%20Telemed%20Guidance%20Updated%20March%2018. pdf?[email or web address]                      https://[facility]. [facility]/Portals/0/Resources/COVID-19/3_18%20Telemed%20Guidance%20Updated%20March%2018. pdf?[email or web address]                      [email or web address] pdf                            General: intubated, on ventilator, vitals reviewed   HEENT: endotracheal tube in place per primary, rest deferred   Cardiovascular: Telemetry data reviewed, rest deferred   Pulmonary: deferred  Abdomen/GI: deferred  Neuro: deferred  Skin: deferred  Musculoskeletal:  deferred  Genitourinary: Deferred  Psych: deferred  Lymphatic/Immunologic: deferred       Intake and output:    I/O last 3 completed shifts:  In: -   Out: 3700 [Urine:3700]    Lab Data:   All available labs and old records have been reviewed by me. CBC:  Recent Labs     11/15/20  0500 11/16/20  0430 11/17/20  0332   WBC 3.5* 4.2 15.0*   RBC 3.23* 3.24* 4.03   HGB 9.8* 9.8* 12.2   HCT 32.2* 31.7* 39.3    164 281   MCV 99.5 97.8 97.4   MCH 30.2 30.1 30.3   MCHC 30.4* 30.8* 31.1   RDW 14.0 13.7 13.6        BMP:  Recent Labs     11/16/20  0430 11/16/20  1443 11/17/20  0331 11/17/20  1220 11/17/20  1530   * 150* 149* 142 147*   K 2.9* 4.2 3.2* 3.7  --    CL 98*  --  98* 98*  --    CO2 48*  --  41* 39*  --    BUN 4*  --  5* 5*  --    CREATININE <0.5*  --  <0.5* <0.5*  --    CALCIUM 8.0*  --  8.8 8.1*  --    GLUCOSE 216*  --  233* 280*  --         Hepatic Function Panel:   Lab Results   Component Value Date    ALKPHOS 85 11/17/2020    ALT 39 11/17/2020    AST 59 11/17/2020    PROT 4.8 11/17/2020    BILITOT 1.2 11/17/2020    LABALBU 2.5 11/17/2020       CPK:   Lab Results   Component Value Date    CKTOTAL 25 (L) 11/04/2020     ESR: No results found for: SEDRATE  CRP:   Lab Results   Component Value Date    CRP 13.9 (H) 11/17/2020           Imaging: All pertinent images and reports for the current visit were reviewed by me during this visit. XR CHEST PORTABLE   Final Result   Satisfactory endotracheal tube placement. Enteric tube terminates in the stomach. The side hole is near the GE   junction. Advancement of 5-10 cm should improve function if to be used for   aspiration. Left pleural effusion with associated basilar airspace disease. XR CHEST PORTABLE   Final Result   Left pleural effusion is reduced to moderate in size. There is no   pneumothorax. Left basilar opacity, likely atelectasis. Right perihilar opacity, likely atelectasis. Pneumonia is still differential concern.          XR CHEST PORTABLE   Final Result   Complete opacification of the left hemithorax, may be related to atelectasis,   and large pleural effusion, markedly progressed since the prior study. Patchy airspace opacities in the right infrahilar region, may be related to   mild pulmonary edema versus pneumonia. XR CHEST PORTABLE   Final Result   Bilateral pleural effusions and bibasilar opacities persists suggesting   atelectasis or pneumonia         CT LUMBAR SPINE WO CONTRAST   Final Result   Unremarkable non-contrast CT of the lumbar spine. No discrete sacral bone erosion or destruction evident. Correlate with CT pelvis for description of soft tissues adjacent to the   sacrum which are incompletely included in the field of view on this exam.         CT PELVIS WO CONTRAST Additional Contrast? None   Final Result   Deep left posterior soft tissue ulcer extending within 2 mm of the proximal   coccyx. No well-defined bony erosions are seen to suggest acute   osteomyelitis. There is fat stranding adjacent to the ulcer compatible with   cellulitis. Nonspecific prominent presacral fat stranding. Simple-appearing free fluid   along the right paracolic gutter. There is fat stranding extending from the skin to the level of the right   ischium compatible with cellulitis. Generalized subcutaneous edema about the pelvis and upper thighs. There is a small hyperdensity at the right bladder base. Unclear if this is   contrast from a recent CT or a true bladder lesion. This could be followed   up with CT or further assessed with a cystoscopy as clinically indicated. There is also air in the bladder. Correlate for recent intervention. CT HEAD WO CONTRAST   Final Result   Evaluation of the parenchyma, skull, and soft tissues at the vertex is motion   degraded. Otherwise, no acute intracranial abnormality. Bifrontal volume loss, greater than expected for age. Chronic sphenoid and ethmoid sinus mucosal disease.          XR CHEST PORTABLE   Final Result   Small bilateral infection A49.8    Endotracheally intubated Z97.8    On mechanically assisted ventilation ([address] Z99.11       Please note that this chart was generated using Dragon dictation software. Although every effort was made to ensure the accuracy of this automated transcription, some errors in transcription may have occurred inadvertently. If you may need any clarification, please do not hesitate to contact me through [employer] or at the phone number provided below with my electronic signature. Any pictures or media included in this note were obtained after taking informed verbal consent from the patient and with their approval to include those in the patient's medical record.     Alfredito Kowalski MD, MPH  11/17/2020 , 5:28 PM   [facility]   [address] [place] [address], [address] [place] [address]  Office: [phone]  Fax: [phone]  Clinic days:  Tuesday & Thursday

## 2020-11-17 NOTE — PROGRESS NOTES
11/17/20 0813   Vent Patient Data   Plateau Pressure 25 ABA92   Static Compliance 34.31 mL/cmH2O   Dynamic Compliance 26.24 mL/cmH2O

## 2020-11-17 NOTE — PROCEDURES
Ignacio Laboy is a 50 y.o. female patient. 1. Wound of sacral region, initial encounter    2. Sepsis, due to unspecified organism, unspecified whether acute organ dysfunction present [facility])      Past Medical History:   Diagnosis Date    Adult failure to thrive     Allergic     Anemia     related to gastric bypass, followed by Dr. Mona Deleon Calculus of kidney     [facility] ([facility]estive [facility] failure) ([facility]     Chronic neck pain     Copper deficiency     Depression     Hypertension     Pulmonary emboli (HCC)     Sciatica     Seizures ([facility] 3/2014    hypoglycemia and/or benzo withdrawal    Vitamin B12 deficiency neuropathy (HCC)      Blood pressure (!) 148/103, pulse 82, temperature 97 °F (36.1 °C), temperature source Temporal, resp. rate 17, height 5' 7\" (1.702 m), weight 161 lb 12.8 oz (73.4 kg), last menstrual period 05/17/2014, SpO2 100 %, not currently breastfeeding. Intubation    Date/Time: 11/16/2020 9:57 PM  Performed by: Abraham Blackburn MD  Authorized by: Abraham Blackburn MD   Consent: The procedure was performed in an emergent situation. Site marked: the operative site was marked  Required items: required blood products, implants, devices, and special equipment available  Patient identity confirmed: hospital-assigned identification number  Indications: respiratory failure  Intubation method: video-assisted  Patient status: sedated  Preoxygenation: BVM  Sedatives: etomidate  Paralytic: none  Tube size: 7.0 mm  Tube type: cuffed  Number of attempts: 2  Cords visualized: yes  Post-procedure assessment: chest rise and ETCO2 monitor  Breath sounds: equal and absent over the epigastrium  Cuff inflated: yes  ETT to lip: 20 cm  Patient tolerance: Patient tolerated the procedure well with no immediate complications  Comments: CXR pending.           Abraham Blackburn MD  11/16/2020

## 2020-11-17 NOTE — CONSULTS
occasional assist; LOC full/confusion  50%   [] Mainly sit/lie; Extensive disease; Can't do any work; Considerable assist; intake normal  Or reduced; LOC full/confusion  40%   [] Mainly in bed; Extensive disease; Mainly assist; intake normal or reduced; occasional assist; LOC full/confusion  30%   [] Bed Bound; Extensive disease; Total care; intake reduced; LOC full/confusion  20%   [] Bed Bound; Extensive disease; Total care; intake minimal; Drowsy/coma  10%   [x] Bed Bound; Extensive disease; Total care; Mouth care only; Drowsy/coma    PPS10%  Symptom Assessment: Appetite/Nausea/Bowels/Fatigue:     lbs. BMI 24.59    Albumin 2.5      Social History:   reports that she has quit smoking. Her smoking use included cigarettes. She smoked 1.00 pack per day. She has never used smokeless tobacco. She reports that she does not drink alcohol or use drugs. Family History:  family history includes Dementia in her mother; Heart Attack in her father; Heart Disease in her father and maternal grandmother; High Blood Pressure in her maternal grandmother and mother; Jennifer Gathers in her paternal grandfather; Stroke in her father; Thyroid Cancer in her maternal grandmother and mother. Psychological/Spiritual:  .  unable to contact and is not part of her care. No children. Christianity non-[employer].          Family Discussion:    Patient intubated yesterday. VS not BP low @ 89/60 currently. Call to patient's mother Peter Armas for update. Cornelius Howell was not notified last PM regarding intubation. Much emotional support provided as mother states she lost her  to [employer] in June. Discussed Advance Directives/Code status. Education provided on all variables. Mother very tearful yet able to understand. Agrees to change code status to [facility]. She states patient has been sick for some time and aware of skin breakdown on coccyx. Discussed Christianity. Agrees for Nicolasa Shukla spiritual support. Will notify. Perfect Serve to Physician for code status orders. Nurse Keny Goss updated of our conversations. Potential DC plans should patient stabilize is to return to LTC. She would qualify for hospice comfort care support if desired. Will continue to follow and provide support as needed.

## 2020-11-18 NOTE — PROGRESS NOTES
[facility] Pulmonary/CCM Progress note      Admit Date: 11/9/2020    Chief Complaint: Fever and shortness of breath    Subjective: Interval History: Worsening respiratory status, hypotension requiring multiple vasopressors. Low urine output. Persistent hypercapnia noted. Poorly responsive.     Scheduled Meds:   meropenem  1 g Intravenous Q8H    linezolid  600 mg Intravenous Q12H    sodium chloride  1,000 mL Intravenous Once    remdesivir IVPB  100 mg Intravenous Q24H    dexamethasone (DECADRON) IVPB  10 mg Intravenous Daily    enoxaparin  1 mg/kg Subcutaneous BID    lidocaine-EPINEPHrine  20 mL Intradermal Once    Sodium Hypochlorite   Irrigation BID    sodium chloride  20 mL Intravenous Once    furosemide  40 mg Intravenous Once    sodium chloride  20 mL Intravenous Once    vitamin C  500 mg Oral Daily    busPIRone  5 mg Oral BID    calcium-vitamin D  1 tablet Oral BID    folic acid  1 mg Oral Daily    loperamide  2 mg Oral Q12H    therapeutic multivitamin-minerals  1 tablet Oral Daily    promethazine  25 mg Oral Q12H    QUEtiapine  25 mg Oral Nightly    vitamin B-12  500 mcg Oral Daily     Continuous Infusions:   vasopressin (Septic Shock) infusion 0.03 Units/min (11/18/20 1104)    norepinephrine 30 mcg/min (11/18/20 0512)    phenylephrine (MIKEL-SYNEPHRINE) 50mg/250mL infusion 50 mcg/min (11/18/20 1330)    propofol Stopped (11/18/20 0900)    fentaNYL Stopped (11/18/20 0900)    dextrose 50 mL/hr at 11/18/20 1314    dextrose       PRN Meds:microfibrillar collagen, magnesium sulfate, glucose, dextrose, glucagon (rDNA), dextrose, potassium chloride, LORazepam, guaiFENesin-dextromethorphan, HYDROcodone-acetaminophen    Review of Systems  Unable to obtain since the patient is intubated, poorly responsive    Objective:     Patient Vitals for the past 8 hrs:   BP Temp Temp src Pulse Resp SpO2   11/18/20 1400 113/85 -- -- 141 20 --   11/18/20 1345 -- -- -- 107 -- --   11/18/20 1330 (!) 122/37 -- -- closely. Chest x-ray shows a small right lateral pneumothorax-only requires monitoring. Left basal infiltrate has now disappeared, suggestive of improved atelectasis rather than pleural effusion as previously thought. Altered mental status-possibly related to hypercapnia. CT head from 11/9 was negative for acute pathology. We will continue to monitor    Septic shock/hypotension-patient is currently on Levophed, phenylephrine and vasopressin. Femoral central line and arterial line were inserted at bedside today. Lactate 4.85. Treat for secondary bacterial infection with empiric antibiotics. WBC 34 today. Patient is currently on meropenem and Zyvox. Try to titrate down phenylephrine. Hypernatremia-has resolved, sodium of 141. Though urine osmolarity was low, given the low urine output now along with a normal sodium makes diabetes insipidus unlikely. Oliguria possibly from septic shock/poor perfusion. Creatinine is currently stable. Continue remdesivir and Decadron for COVID-19 pneumonia. Recent PE on Lovenox. D-dimer 300. Hold tube feeds today. Long discussion with patient's mother and son at bedside. Patient had poor quality of life overall, has been wheelchair/bedbound for some time-has deep sacral decubitus ulcer. Currently DNR-CCA. No further escalation of care or invasive procedures. They do have an understanding that the overall prognosis is guarded, particularly in view of recurrent need for intubation for COVID-19. Yuridia Cornelius MD     Critical care time of 80 minutes excluding procedures.

## 2020-11-18 NOTE — PROGRESS NOTES
Infectious Diseases   Progress Note      Admission Date: 11/9/2020  Hospital Day: Hospital Day: 10   Attending: Cristy Pham MD  Date of service: 11/18/2020     Chief complaint/ Reason for consult:     · Acute respiratory failure with hypoxia  · Severe COVID-19 pneumonia  · Elevated CRP of 24.5 in setting of COVID-19  · Thrombocytopenia platelet count of 211 in setting of COVID-19  · Coccygeal wound infection with E. coli    Microbiology:        I have reviewed allavailable micro lab data and cultures    · Blood culture (2/2) - collected on 11/9/2020:  Negative so far   · Urine Legionella and pneumococcal antigens- collected on 11/9/2020: Negative  · COVID-19 PCR test :    SARS-CoV-2, PCR   COVID-19   Collected:  11/04/20 1410    Result status:  Final    Resulting lab:  [address] [place] [address] LAB    Reference range:  Not Detected    Value:  DETECTEDAbnormal       Comment: This test has been authorized by FDA under an   Emergency Use Authorization (EUA). · Coccygeal wound culture: Heavy growth of E.  Coli    Escherichia coli (1)     Antibiotic  Interpretation  KRZYSZTOF  Status     ampicillin  Resistant  >=32  mcg/mL      ceFAZolin  Sensitive  <=4  mcg/mL      cefepime  Sensitive  <=0.12  mcg/mL      cefTRIAXone  Sensitive  <=0.25  mcg/mL      ciprofloxacin  Resistant  >=4  mcg/mL      ertapenem  Sensitive  <=0.12  mcg/mL      gentamicin  Sensitive  <=1  mcg/mL      levofloxacin  Resistant  >=8  mcg/mL      piperacillin-tazobactam  Sensitive  <=4  mcg/mL      trimethoprim-sulfamethoxazole  Resistant  >=320  mcg/mL              Antibiotics and immunizations:       Current antibiotics: All antibiotics and their doses were reviewed by me    Recent Abx Admin                   remdesivir 100 mg in sodium chloride 0.9 % 250 mL IVPB (mg) 100 mg New Bag 11/18/20 1314    meropenem (MERREM) 1 g in sodium chloride 0.9 % 100 mL IVPB (mini-bag) (g) 1 g New Bag 11/18/20 1100    linezolid (ZYVOX) IVPB 600 mg (mg) 600 mg New Bag 11/18/20 1051                  Immunization History: All immunization history was reviewed by me today. There is no immunization history for the selected administration types on file for this patient. Known drug allergies: All allergies were reviewed and updated    Allergies   Allergen Reactions    Acetaminophen Other (See Comments)     Pt states \"I am not supposed to take it because of my liver syndrome\"    Codeine Hives and Itching    Morphine Itching    Penicillins Hives and Swelling     Facial swelling    Morphine And Related Nausea And Vomiting    Ondansetron Hcl Itching and Rash       Social history:     Social History:  All social andepidemiologic history was reviewed and updated by me today as needed. · Tobacco use:   reports that she has quit smoking. Her smoking use included cigarettes. She smoked 1.00 pack per day. She has never used smokeless tobacco.  · Alcohol use:   reports no history of alcohol use. · Currently lives in: [address] [place] [address] Dr Sharma  reports no history of drug use. Assessment:     The patient is a 50 y.o. old female who  has a past medical history of Adult failure to thrive, Allergic, Anemia, Anxiety, Calculus of kidney, CHF (congestive heart failure) ([address] Chronic neck pain, Copper deficiency, Depression, Hypertension, Pulmonary emboli ([address] Sciatica, Seizures ([address] (3/2014), and Vitamin B12 deficiency neuropathy ([address] with following problems:    · Acute respiratory failure with hypoxia-has been intubated on 10/16/2020 night-remains on ventilator  · Septic shock with hypotension requiring vasopressors, severe lactic acidosis, severe bandemia-this is a new finding  · Right-sided pneumothorax-this is new finding  · Severe COVID-19 pneumonia-has been on remdesivir and Decadron  · Elevated CRP-monitor trends  · Thrombocytopenia  -resolved  · Coccygeal wound infection with E. coli-has received 7 days of gram-negative coverage.   · A+ blood group  · Chronic anemia-hemoglobin stable  · History of pulmonary embolism  · Seizure disorder  · Ex smoker      Discussion:      Patient remains on IV remdesivir. Today is day 9 of IV remdesivir. White cell count has gone up to 34,600 today. Patient is also developed serial lactic acidosis with serum lactate of 4.85. Patient has become hypotensive and is in septic shock    Liver enzymes are okay. D-dimer is 216. I had ordered 2 sets of blood cultures yesterday, which are in process. Chest x-ray reviewed. Patient has developed small right lateral pneumothorax. Has a Port-A-Cath in place    Plan:     Diagnostic Workup:    · Continue to monitor LDH, D-dimer, CRP, procalcitonin every other day for now. · Continue to follow  fever curve, WBC count and blood cultures. · Follow up on blood counts, liver and renal function. · Continue to watch for any hyperglycemia, transaminitis, renal dysfunction, anemia, constipation issues while on remdesivir. · Follow-up on blood cultures   · Follow-up on tracheal aspirate culture nasal MRSA screening culture from yesterday      Antimicrobials:    · Will continue IV remdesivir 100 mg every 24 hour. Plan for total of 10-day course of remdesivir  · Patient has developed septic shock. Agree with starting IV linezolid 600 mg every 12 hour empirically  · Agree with starting IV meropenem 1 g every 8 hours  · Vasopressor support to maintain mean arterial pressure greater than 65 mmHg  · Continue Decadron daily at a dose of 10 mg IV daily  · Endotracheal tube care and pulmonary toileting  · Anticoagulation per primary and pulmonary. · Continue to monitor vitals closely. · Continue ventilator support to oxygen saturation above 92%. · Maintain good glycemic control. · Continue to watch for new or worsening fever or productive cough or other signs of secondary bacterial infection. · Fall and aspiration precautions. · COVID-19 isolation precautions.   · Has clinically worsened significantly over the past 48 hours. · She is critically ill. High risk of morbidity and mortality  · Continue close monitoring COVID-19 ICU      Drug Monitoring:    · Continue monitoring for antimicrobial agent toxicity as follows: CBC, CMP  · Continue to watch for following: new or worsening fever, new hypotension, hives, lip swelling and redness or purulence at vascular access sites. I/v access Management:    · Continue to monitor i.v access sites for erythema, induration, discharge or tenderness. · As always, continue efforts to minimize tubes/lines/drains as clinically appropriate to reduce chances of line associated infections. Risk of Complications/Morbidity/Mortality: High     · Patient is critically ill and has a potentially life threatening infection that poses threat to life/bodily function. · There is potential for worsening infection/ sudden clinically significant or life-threatening deterioration in the patient's condition without appropriate antimicrobial therapy. · Complex medical decision making process was involved to select appropriate antimicrobial agents to reverse the cause of patient's severe infection/ illness. · Antimicrobial therapy requires intensive monitoring for toxicity and frequent dose adjustments to prevent toxicity and permanent end-organ dysfunction. Critical care time: 32 minutes      Thank you for involving me in the care of your patient. I will continue to follow. If you have anyadditional questions, please do not hesitate to contact me. Subjective: Interval history: Interval history was obtained from chart review and RN. Patient has become hypotensive. White cell count has gone up significantly. Has developed severe lactic acidosis    REVIEW OF SYSTEMS:      Review of Systems   Unable to perform ROS: Intubated         Past Medical History: All past medical history reviewed today.     Past Medical History:   Diagnosis Date    Adult failure to thrive     Allergic     Anemia     related to gastric bypass, followed by Dr. Maria Fernanda Ivey Calculus of kidney     [facility]     Chronic neck pain     Copper deficiency     Depression     Hypertension     Pulmonary emboli (HCC)     Sciatica     Seizures ([facility] 3/2014    hypoglycemia and/or benzo withdrawal    Vitamin B12 deficiency neuropathy (HCC)        Past Surgical History: All past surgical history was reviewed today. Past Surgical History:   Procedure Laterality Date    ABDOMINOPLASTY      CHOLECYSTECTOMY      GASTRIC BYPASS SURGERY      TUNNELED VENOUS PORT PLACEMENT Right 5/2/2014    Dr. Tan Badillo       Family History: All family history was reviewed today. Problem Relation Age of Onset    Dementia Mother     High Blood Pressure Mother     Thyroid Cancer Mother     Heart Disease Father     Stroke Father     Heart Attack Father     Thyroid Cancer Maternal Grandmother     High Blood Pressure Maternal Grandmother     Heart Disease Maternal Grandmother     Lung Cancer Paternal Grandfather        Objective:       PHYSICAL EXAM:      Vitals:   Vitals:    11/18/20 1445 11/18/20 1500 11/18/20 1515 11/18/20 1530   BP:  (!) 104/59     Pulse: 129 149 121 125   Resp:  20     Temp:       TempSrc:       SpO2:       Weight:       Height:           Physical Exam       PHYSICAL EXAM:     In-person bedside physical examination deferred. Pursuant to the emergency declaration under the [facility] [address] and the [employer] Act, this clinical encounter was conducted to provide necessary medical care.    (Also consistent with new provisions and guidance offered by [facility] on March 18, 2020 in setting of COVID 19 outbreak and in order to preserve personal protective equipment in accordance with the flexibilities 11/18/2020    ALT 34 11/18/2020    AST 35 11/18/2020    PROT 4.5 11/18/2020    BILITOT 0.5 11/18/2020    LABALBU 2.3 11/18/2020       CPK:   Lab Results   Component Value Date    CKTOTAL 25 (L) 11/04/2020     ESR: No results found for: SEDRATE  CRP:   Lab Results   Component Value Date    CRP 13.9 (H) 11/17/2020           Imaging: All pertinent images and reports for the current visit were reviewed by me during this visit. XR CHEST PORTABLE   Final Result   Small right lateral pneumothorax. Enteric tube with the tip within the proximal stomach and sidehole likely   near the gastroesophageal junction. Recommend advancing. The findings were sent to the Radiology Results [address] at 12:16   pm on 11/18/2020 to be communicated to a licensed caregiver. XR CHEST PORTABLE   Final Result   Satisfactory endotracheal tube placement. Enteric tube terminates in the stomach. The side hole is near the GE   junction. Advancement of 5-10 cm should improve function if to be used for   aspiration. Left pleural effusion with associated basilar airspace disease. XR CHEST PORTABLE   Final Result   Left pleural effusion is reduced to moderate in size. There is no   pneumothorax. Left basilar opacity, likely atelectasis. Right perihilar opacity, likely atelectasis. Pneumonia is still differential concern. XR CHEST PORTABLE   Final Result   Complete opacification of the left hemithorax, may be related to atelectasis,   and large pleural effusion, markedly progressed since the prior study. Patchy airspace opacities in the right infrahilar region, may be related to   mild pulmonary edema versus pneumonia. XR CHEST PORTABLE   Final Result   Bilateral pleural effusions and bibasilar opacities persists suggesting   atelectasis or pneumonia         CT LUMBAR SPINE WO CONTRAST   Final Result   Unremarkable non-contrast CT of the lumbar spine.       No discrete sacral bone erosion or destruction evident. Correlate with CT pelvis for description of soft tissues adjacent to the   sacrum which are incompletely included in the field of view on this exam.         CT PELVIS WO CONTRAST Additional Contrast? None   Final Result   Deep left posterior soft tissue ulcer extending within 2 mm of the proximal   coccyx. No well-defined bony erosions are seen to suggest acute   osteomyelitis. There is fat stranding adjacent to the ulcer compatible with   cellulitis. Nonspecific prominent presacral fat stranding. Simple-appearing free fluid   along the right paracolic gutter. There is fat stranding extending from the skin to the level of the right   ischium compatible with cellulitis. Generalized subcutaneous edema about the pelvis and upper thighs. There is a small hyperdensity at the right bladder base. Unclear if this is   contrast from a recent CT or a true bladder lesion. This could be followed   up with CT or further assessed with a cystoscopy as clinically indicated. There is also air in the bladder. Correlate for recent intervention. CT HEAD WO CONTRAST   Final Result   Evaluation of the parenchyma, skull, and soft tissues at the vertex is motion   degraded. Otherwise, no acute intracranial abnormality. Bifrontal volume loss, greater than expected for age. Chronic sphenoid and ethmoid sinus mucosal disease. XR CHEST PORTABLE   Final Result   Small bilateral effusions and bibasilar airspace disease. XR CHEST PORTABLE    (Results Pending)       Medications: All current and past medications were reviewed.      meropenem  1 g Intravenous Q8H    linezolid  600 mg Intravenous Q12H    QUEtiapine  25 mg Oral Nightly    sodium chloride  1,000 mL Intravenous Once    remdesivir IVPB  100 mg Intravenous Q24H    dexamethasone (DECADRON) IVPB  10 mg Intravenous Daily    enoxaparin  1 mg/kg Subcutaneous BID  lidocaine-EPINEPHrine  20 mL Intradermal Once    Sodium Hypochlorite   Irrigation BID    sodium chloride  20 mL Intravenous Once    furosemide  40 mg Intravenous Once    sodium chloride  20 mL Intravenous Once    vitamin C  500 mg Oral Daily    busPIRone  5 mg Oral BID    calcium-vitamin D  1 tablet Oral BID    folic acid  1 mg Oral Daily    therapeutic multivitamin-minerals  1 tablet Oral Daily    vitamin B-12  500 mcg Oral Daily        vasopressin (Septic Shock) infusion 0.03 Units/min (11/18/20 1530)    norepinephrine 30 mcg/min (11/18/20 1530)    phenylephrine (MIKEL-SYNEPHRINE) 50mg/250mL infusion 25 mcg/min (11/18/20 1400)    propofol Stopped (11/18/20 0900)    fentaNYL Stopped (11/18/20 0900)    dextrose 50 mL/hr at 11/18/20 1314    dextrose         microfibrillar collagen, magnesium sulfate, glucose, dextrose, glucagon (rDNA), dextrose, potassium chloride, LORazepam, guaiFENesin-dextromethorphan, HYDROcodone-acetaminophen      Problem list:       Patient Active Problem List   Diagnosis Code    Vitamin B12 deficiency E53.8    Intestinal malabsorption following gastrectomy K91.2, Z90.3    KETAN (iron deficiency anemia) D50.9    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, G63, 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.2    Acute respiratory failure with hypoxemia ([facility]) J96.01    Pulmonary embolus ([facility]) I26.99    Enterococcus UTI N39.0, B95.2    Functional quadriplegia (HCC) R53.2    Sepsis ([facility]) A41.9    Sacral wound S31.000A    Elevated C-reactive protein (CRP) R79.82    Thrombocytopenia (HCC) D69.6    Chronic anemia D64.9    History of pulmonary embolism Z86.711    Seizure disorder ([facility] G40.909    Ex-smoker Z87.891    E coli infection A49.8    Endotracheally intubated Z97.8    On mechanically assisted ventilation (HCC) Z99.11    Acute on chronic respiratory failure with hypoxia and hypercapnia (HCC) J96.21, J96.22    Pneumonia due to COVID-19 virus U07.1, J12.89    Pleural effusion on left J90    Somnolence R40.0    Septic shock (HCC) A41.9, R65.21    Lactic acidosis E87.2    Bandemia D72.825       Please note that this chart was generated using Dragon dictation software. Although every effort was made to ensure the accuracy of this automated transcription, some errors in transcription may have occurred inadvertently. If you may need any clarification, please do not hesitate to contact me through EPIC or at the phone number provided below with my electronic signature. Any pictures or media included in this note were obtained after taking informed verbal consent from the patient and with their approval to include those in the patient's medical record.     Rico Singh MD, MPH  11/18/2020 , 4:18 PM   [facility] Infectious Disease   [address] [place] [address], [address] [place] [address]  Office: [phone]  Fax: [phone]  Clinic days:  Tuesday & Thursday

## 2020-11-18 NOTE — PROGRESS NOTES
11/18/20 0922   [facility] Patient Data   I:E Ratio 1:2.9   Plateau Pressure 22 JCF28   Static Compliance 39.4 mL/cmH2O   Dynamic Compliance 32 mL/cmH2O

## 2020-11-18 NOTE — PROGRESS NOTES
Office : [phone]     Fax :[phone]       Nephrology  Progress Note    Reason for Consult: electrolyte disturbances         Chief Complaint:    Chief Complaint   Patient presents with    Fever     Arrived by [facility] from [facility] rt fever. Recent COVID positive dx.  99.7 oral.            History of Present Ilness:    Sheree Mendes is a 50 y.o. female with h/o CHF, HTN , PE, h/p Failure to thrive has been admitted with acute hypoxic respiratory failure with COVID 19 infection. She is not bale to give history so all history is from medical records   Currently on BiPAP  Initial sodium level was 152 which corrected rapidly to 139 . Now 146   Also, K is 2.9 today   UOP is good     Interval hx     Intubated   On vent support now   Overall condition worsened  Acute respiratory failure  Hypotensive     I/O last 3 completed shifts:   In: 2887 [I.V.:2887]  Out: 0 [Urine:1625]    Past Medical History:   Diagnosis Date    Adult failure to thrive     Allergic     Anemia     related to gastric bypass, followed by Dr. Beatrice Alcantara    Anxiety     Calculus of kidney     [facility] failure) [facility]     Chronic neck pain     Copper deficiency     Depression     Hypertension     Pulmonary emboli (HCC)     Sciatica     Seizures ([address] 3/2014    hypoglycemia and/or benzo withdrawal    Vitamin B12 deficiency neuropathy (HCC)        Past Surgical History:   Procedure Laterality Date    ABDOMINOPLASTY      CHOLECYSTECTOMY      GASTRIC BYPASS SURGERY      TUNNELED VENOUS PORT PLACEMENT Right 5/2/2014    Dr. Lisandro Alberts       Family History   Problem Relation Age of Onset    Dementia Mother     High Blood Pressure Mother     Thyroid Cancer Mother     Heart Disease Father    Silvestre Stroke Father     Heart Attack Father     Thyroid Cancer Maternal Grandmother     High Blood Pressure Maternal Grandmother     Heart Disease Maternal Grandmother     Lung Cancer Paternal Grandfather          Current Medications:    vasopressin 20 Units in dextrose 5 % 100 mL infusion, Continuous  Surgicel Powder 3 gram Syringe, PRN  meropenem (MERREM) 1 g in sodium chloride 0.9 % 100 mL IVPB (mini-bag), Q8H  linezolid (ZYVOX) IVPB 600 mg, Q12H  0.9 % sodium chloride bolus, Once  magnesium sulfate 1 g in dextrose 5% 100 mL IVPB, PRN  norepinephrine (LEVOPHED) 16 mg in dextrose 5% 250 mL infusion, Continuous  phenylephrine (MIKEL-SYNEPHRINE) 50 mg in dextrose 5 % 250 mL infusion, Continuous  0.9 % sodium chloride bolus, Once  remdesivir 100 mg in sodium chloride 0.9 % 250 mL IVPB, Q24H  propofol injection, Titrated  fentaNYL 10 mcg/mL infusion, Continuous  dexamethasone (DECADRON) 10 mg in sodium chloride 0.9 % IVPB, Daily  dextrose 5 % solution, Continuous  glucose (GLUTOSE) 40 % oral gel 15 g, PRN  dextrose 50 % IV solution, PRN  glucagon (rDNA) injection 1 mg, PRN  dextrose 5 % solution, PRN  potassium chloride 20 mEq/50 mL IVPB (Central Line), PRN  LORazepam (ATIVAN) injection 0.5 mg, Q4H PRN  enoxaparin (LOVENOX) injection 70 mg, BID  lidocaine-EPINEPHrine 1 percent-1:105871 injection 20 mL, Once  Sodium Hypochlorite (DAKINS) 0.25 % external solution, BID  guaiFENesin-dextromethorphan (ROBITUSSIN DM) 100-10 MG/5ML syrup 10 mL, Q4H PRN  0.9 % sodium chloride bolus, Once  furosemide (LASIX) injection 40 mg, Once  0.9 % sodium chloride bolus, Once  vitamin C (ASCORBIC ACID) tablet 500 mg, Daily  busPIRone (BUSPAR) tablet 5 mg, BID  calcium-vitamin D 500-200 MG-UNIT per tablet 1 tablet, BID  folic acid (FOLVITE) tablet 1 mg, Daily  loperamide (IMODIUM) capsule 2 mg, Q12H  therapeutic multivitamin-minerals 1 tablet, Daily  promethazine (PHENERGAN) tablet 25 mg, Q12H  QUEtiapine (SEROQUEL) tablet 25 mg, Nightly  vitamin B-12 (CYANOCOBALAMIN) tablet 500 mcg, Daily  HYDROcodone-acetaminophen (NORCO) 7.5-325 MG per tablet 1 tablet, Q4H PRN        Physical exam:     Vitals:  BP (!) 72/41   Pulse 141   Temp 97.8 °F (36.6 °C) (Temporal)   Resp 14   Ht 5' 7\" (1.702 m)   Wt 157 lb (71.2 kg)   LMP 05/17/2014   SpO2 100%   BMI 24.59 kg/m²   Constitutional:  Intubated   Skin: no rash, turgor wnl  Heent:  eomi, mmm  Neck: no bruits or jvd noted  Cardiovascular:  S1, S2 without m/r/g  Respiratory: CTA B without w/r/r  Abdomen:  +bs, soft, nt, nd  Ext: no  lower extremity edema      Labs:  CBC:   Recent Labs     11/16/20 0430 11/17/20 0332 11/17/20 2109 11/18/20 0230 11/18/20  0536   WBC 4.2 15.0*  --   --  34.6*   HGB 9.8* 12.2 10.2* 8.8* 8.8*    281  --   --  284     BMP:    Recent Labs     11/17/20 0331 11/17/20 1220 11/17/20 2057 11/18/20 0230 11/18/20  0536   * 142   < > 145 143 141   K 3.2* 3.7  --   --   --  4.1   CL 98* 98*  --   --   --  98*   CO2 41* 39*  --   --   --  33*   BUN 5* 5*  --   --   --  14   CREATININE <0.5* <0.5*  --   --   --  <0.5*   GLUCOSE 233* 280*  --   --   --  140*    < > = values in this interval not displayed. Ca/Mg/Phos:   Recent Labs     11/16/20 0430 11/17/20 0331 11/17/20 1220 11/17/20 2057 11/18/20  0536   CALCIUM 8.0* 8.8 8.1*  --  8.1*   MG 1.90 1.70*  --  2.20  --      Hepatic:   Recent Labs     11/16/20 0430 11/17/20  0331 11/18/20  0536   AST 36 59* 35   ALT 27 39 34   BILITOT 0.4 1.2* 0.5   ALKPHOS 72 85 83     Troponin: No results for input(s): TROPONINI in the last 72 hours. BNP: No results for input(s): BNP in the last 72 hours. Lipids: No results for input(s): CHOL, TRIG, HDL, LDLCALC, LABVLDL in the last 72 hours.   ABGs:   Recent Labs     11/18/20  0010   PHART 7.321*   PO2ART 288.0*   AMG7IED 80.4*     INR:   Recent Labs     11/18/20  0536   INR 1.25*     UA:  Recent Labs     11/17/20  1220   SPECGRAV 1.006      Urine Microscopic: No results for input(s): LABCAST, BACTERIA, COMU, HYALCAST, WBCUA, RBCUA, EPIU in the last 72 hours. Urine Culture: No results for input(s): LABURIN in the last 72 hours. Urine Chemistry:   Recent Labs     11/17/20  1220   NAUR 54                IMAGING:  XR CHEST PORTABLE   Final Result   Satisfactory endotracheal tube placement. Enteric tube terminates in the stomach. The side hole is near the GE   junction. Advancement of 5-10 cm should improve function if to be used for   aspiration. Left pleural effusion with associated basilar airspace disease. XR CHEST PORTABLE   Final Result   Left pleural effusion is reduced to moderate in size. There is no   pneumothorax. Left basilar opacity, likely atelectasis. Right perihilar opacity, likely atelectasis. Pneumonia is still differential concern. XR CHEST PORTABLE   Final Result   Complete opacification of the left hemithorax, may be related to atelectasis,   and large pleural effusion, markedly progressed since the prior study. Patchy airspace opacities in the right infrahilar region, may be related to   mild pulmonary edema versus pneumonia. XR CHEST PORTABLE   Final Result   Bilateral pleural effusions and bibasilar opacities persists suggesting   atelectasis or pneumonia         CT LUMBAR SPINE WO CONTRAST   Final Result   Unremarkable non-contrast CT of the lumbar spine. No discrete sacral bone erosion or destruction evident. Correlate with CT pelvis for description of soft tissues adjacent to the   sacrum which are incompletely included in the field of view on this exam.         CT PELVIS WO CONTRAST Additional Contrast? None   Final Result   Deep left posterior soft tissue ulcer extending within 2 mm of the proximal   coccyx. No well-defined bony erosions are seen to suggest acute   osteomyelitis. There is fat stranding adjacent to the ulcer compatible with   cellulitis.       Nonspecific prominent presacral fat stranding. Simple-appearing free fluid   along the right paracolic gutter. There is fat stranding extending from the skin to the level of the right   ischium compatible with cellulitis. Generalized subcutaneous edema about the pelvis and upper thighs. There is a small hyperdensity at the right bladder base. Unclear if this is   contrast from a recent CT or a true bladder lesion. This could be followed   up with CT or further assessed with a cystoscopy as clinically indicated. There is also air in the bladder. Correlate for recent intervention. CT HEAD WO CONTRAST   Final Result   Evaluation of the parenchyma, skull, and soft tissues at the vertex is motion   degraded. Otherwise, no acute intracranial abnormality. Bifrontal volume loss, greater than expected for age. Chronic sphenoid and ethmoid sinus mucosal disease. XR CHEST PORTABLE   Final Result   Small bilateral effusions and bibasilar airspace disease. Assessment/Plan :      1. Hypernatremia   Improving   Decrease the D5W to 50 ml/ hr     2. BP stable     3. Hypokalemia - supplement as needed     4. Stage 3 Decubitus ulcer  Wound care   abx     5. Acute hypoxic respiratory failure. covid 19 +    intubated   On vent support . 100 % fio2.      Overall prognosis poor     Thank you for allowing us to participate in care of Josh Ennis         Electronically signed by: Saskia Daly MD, 11/18/2020, 8:33 AM      [facility] [address]  Office : [phone]  Fax :[phone]

## 2020-11-18 NOTE — PROGRESS NOTES
Per nursing staff, patient had bright red blood bowel movement. She has history of DVT, on full dose Lovenox, already received dose this evening. Nursing communication sent to hold subcutaneous Lovenox tomorrow morning pending attending physician review. Discontinued ibuprofen. Start Protonix infusion, obtain stat H&H and serial H&H. Consult GI to assist with evaluation. Type and screen ordered.

## 2020-11-18 NOTE — OP NOTE
Arterial Catheter Insertion Procedure Note    Procedure: Insertion of Arterial Catheter    Indications: Hypotension    Procedure Details   Informed consent was not obtained since the procedure was done as an emergency    Under sterile conditions the skin above the right groin was prepped with betadine and covered with a sterile drape. Local anesthesia was applied to the skin and subcutaneous tissues. A 18-gauge needle was inserted into the right femoral artery under ultrasound guidance with pulsatile blood return. A guide wire was then passed easily through the needle and a 20 Polish femoral artery catheter which was then advanced with no resistance. Good pulsatile blood returned. The catheter was sutured into place. EBL: 2 cc    Findings: There were no changes to vital signs. Patient did tolerate procedure well. Total time of procedure 15 minutes.

## 2020-11-18 NOTE — CARE COORDINATION
Chart reviewed for discharge planning. Barrier(s) to discharge- Patient is intubated on [id number], fentanyl-propofol, she is on day 9 of remdesivir, decadron Iv, she has E coli in her sacral decub is on merrem and linezolid for this    Tentative discharge plan- patient is LTC resident at [facility], she has been referred to [facility]    Tentative discharge date-to be determined    *Case management will continue to follow progress and update discharge plan as needed.       Jakub Ness, BSN, CCM, RN  [facility]  [phone]

## 2020-11-18 NOTE — PROGRESS NOTES
Assessment reviewed, sedation remains off, patient has no response to verbal tactile or painful stimuli. VSS, NBP and Artrial blood pressure correlate, weaning Balde as noted in MAR. Received report of small right pneumo seen on recent  CXR,this AM. Dr. Sarah Real at bedside, aware of results. Will continue to monitor.

## 2020-11-18 NOTE — PROGRESS NOTES
11/18/20 0335   Spontaneous Breathing Trial (SBT) RT Doc   Contraindications to SBT?  FiO2 > 50%;PEEP > 10 cm H2O

## 2020-11-18 NOTE — FLOWSHEET NOTE
Dr Kinjal Gilbert made aware pt is currently maxed out on Levophed, Neosynephrine, & Vasopressin, BP labile. -160 EKG shows ST. Hgb is trending down but stable at 8.8. Pt also only has central line access of a R CW mediport single lumen for all her gtts. MD aware. Will continue to monitor.

## 2020-11-18 NOTE — PROGRESS NOTES
11/17/20 9612   [facility] Patient Data   Static Compliance 44.78 mL/cmH2O   Dynamic Compliance 28.79 mL/cmH2O

## 2020-11-18 NOTE — FLOWSHEET NOTE
Pt found to have large from sacral wound which is a new finding. CBC sent. Dr Harpreet Huber notified and pending new orders.

## 2020-11-18 NOTE — CONSULTS
Palliative Care: Follow up conversation with mother of patient. Lalit Lal was able to come into unit today to visit patient and obtain detailed update of current physical status. Reviewed code status and hospice philosophy. In agreement to change code status to [facility]. Agreeable to extubate. Mother of patient would like to see patient prior to extubation and agrees after hospice consult. Agreeable for Hospice comfort care. Hospice [facility]. Perfect Serve to physician for orders on code and hospice consult. Submitted. Much emotional support provided to mother of patient. Nurse Anne Quinones updated of these conversations. LIBERTY Richey notified of potential plan.

## 2020-11-18 NOTE — OP NOTE
Central Venous Catheter Insertion Procedure Note    Procedure: Insertion of Central Venous Catheter    Indications:  Septic shock    Procedure Details   Informed consent was not obtained since the procedure was performed as an emergency    Under sterile conditions the skin above the on the right femoral vein was prepped with betadine and covered with a sterile drape. Local anesthesia was applied to the skin and subcutaneous tissues. A 22-gauge needle was used to identify the vein. An 18-gauge needle was then inserted into the vein. A guide wire was then passed easily through the catheter. There were no arrhythmias. The catheter was then withdrawn. A 7.0 Croatian triple-lumen central venous catheter was then inserted into the vessel over the guide wire. The catheter was sutured into place. Findings: There were no changes to vital signs. Catheter was flushed with 10 cc NS. Patient did tolerate procedure well.     EBL: 5cc    Recommendations:  Can immediately use the central line

## 2020-11-19 NOTE — ACP (ADVANCE CARE PLANNING)
ADVANCED CARE PLANNING    Name:Elham Leung       :  1975              MRN:  [id number]      Purpose of Encounter: Advanced care planning in light of ventilator dependent respiratory failure, COVID-19 pneumonia, severe shock, acute blood loss anemia. Parties in attendance: :Bobby Guevara MD, Family members: Tati Allan. Decisional Capacity:No    Diagnosis: Active Problems:    Vitamin B12 deficiency    Intestinal malabsorption following gastrectomy    Acute respiratory failure with hypoxemia (HCC)    Pulmonary embolus (HCC)    Enterococcus UTI    Functional quadriplegia (HCC)    Sepsis (HCC)    Sacral wound    Elevated C-reactive protein (CRP)    Thrombocytopenia (HCC)    Chronic anemia    History of pulmonary embolism    Seizure disorder (HCC)    Ex-smoker    E coli infection    Endotracheally intubated    On mechanically assisted ventilation (HCC)    Acute on chronic respiratory failure with hypoxia and hypercapnia (HCC)    Pneumonia due to COVID-19 virus    Pleural effusion on left    Somnolence    Septic shock (HCC)    Lactic acidosis    Bandemia  Resolved Problems:    * No resolved hospital problems. *    Patients Medical Story: 64-year-old female presented with altered mental status and fever. She was found to be positive for SARS-CoV-2. She has been treated for severe acute respiratory failure secondary to COVID-19 pneumonia. Patient had prolonged hospital course which eventually required endotracheal intubation and ventilation. Today patient is in severe shock requiring 3 pressor support. Her neurological status also appears to be nonresponsive. Now therapeutic anticoagulation has been discontinued because of profuse bleeding from sacral wound. She has acute blood loss anemia with hemoglobin of 5.1. Goals of Care Determinations: Placed a call again this morning to talk to patient's mother.   This is a continuation of discussion from yesterday regarding prognosis and withdrawal of care. It was explained that patient is not only not making any meaningful recovery, her status is actually worsening. She is in bed situation from neurological standpoint with most of the brainstem reflexes absent. Patient mother agrees to comfort care measures, withdrawal of care and terminal extubation. She is once again addressed in consult at bedside with procedures and prognosis reiterated. CODE STATUS DNR CC orders for comfort care and terminal extubation. Plan: Will notify Referring Not In System (Inactive) of change in care plan. Will look at further interventions as needed. Code Status: At this time patient wishes to be DNR-CC  Time Spent with Patient: 30 minutes      Electronically signed by Boston Bro MD on 11/19/2020 at 11:43 AM  Thank you Referring Not In System (Inactive) for the opportunity to be involved in this patient's care.

## 2020-11-19 NOTE — CARE COORDINATION
CM notes patient  this morning, patient's LTC facility updated.     Clemente Barbosa, SPENSERN, CCM, RN  [facility]  [phone]

## 2020-11-19 NOTE — PROGRESS NOTES
Support to pt's mom s/p death. Mom states she wishes [facility]. RN aware. Prayer/support continues.

## 2020-11-19 NOTE — PROGRESS NOTES
[id number] [facility] [phone] mother, Agus Dodd request.   RN aware and will call  home when patient is ready.

## 2020-11-19 NOTE — PROGRESS NOTES
Office : [phone]     Fax :[phone]       Nephrology  Progress Note    Reason for Consult: electrolyte disturbances         Chief Complaint:    Chief Complaint   Patient presents with    Fever     Arrived by [facility] from [place] fever. Recent COVID positive dx.  99.7 oral.            History of Present Ilness:    Alvis Duane is a 50 y.o. female with h/o CHF, HTN , PE, h/p Failure to thrive has been admitted with acute hypoxic respiratory failure with COVID 19 infection. She is not bale to give history so all history is from medical records   Currently on BiPAP  Initial sodium level was 152 which corrected rapidly to 139 . Now 146   Also, K is 2.9 today   UOP is good     Interval hx     Intubated   On vent support now   Overall condition critical   Acute respiratory failure  Hypotensive     I/O last 3 completed shifts:   In: 18 [I.V.:1038; IV Piggyback:700]  Out: 48 [Urine:50]    Past Medical History:   Diagnosis Date    Adult failure to thrive     Allergic     Anemia     related to gastric bypass, followed by Dr. Gina Taveras Calculus of kidney     CHF (congestive heart failure) ([address]     Chronic neck pain     Copper deficiency     Depression     Hypertension     Pulmonary emboli (HCC)     Sciatica     Seizures ([address] 3/2014    hypoglycemia and/or benzo withdrawal    Vitamin B12 deficiency neuropathy (HCC)        Past Surgical History:   Procedure Laterality Date    ABDOMINOPLASTY      CHOLECYSTECTOMY      GASTRIC BYPASS SURGERY      TUNNELED VENOUS PORT PLACEMENT Right 5/2/2014    Dr. Luis Eduardo Balbuena       Family History   Problem Relation Age of Onset    Dementia Mother     High Blood Pressure Mother     Thyroid Cancer Mother     Heart Disease tablet, Q4H PRN        Physical exam:     Vitals:  /69   Pulse 128   Temp 97.8 °F (36.6 °C) (Temporal)   Resp 20   Ht 5' 7\" (1.702 m)   Wt 157 lb (71.2 kg)   LMP 05/17/2014   SpO2 (!) 80%   BMI 24.59 kg/m²   Constitutional:  Intubated   Skin: no rash, turgor wnl  Heent:  eomi, mmm  Neck: no bruits or jvd noted  Cardiovascular:  S1, S2 without m/r/g  Respiratory: CTA B without w/r/r  Abdomen:  +bs, soft, nt, nd  Ext: no  lower extremity edema      Labs:  CBC:   Recent Labs     11/17/20 0332 11/18/20 0230 11/18/20 0536 11/19/20  0400   WBC 15.0*  --   --  34.6* 34.1*   HGB 12.2   < > 8.8* 8.8* 5.1*     --   --  284 160    < > = values in this interval not displayed. BMP:    Recent Labs     11/17/20  1220 11/18/20 0230 11/18/20 0536 11/19/20  0400      < > 143 141 133*   K 3.7  --   --  4.1 5.1   CL 98*  --   --  98* 96*   CO2 39*  --   --  33* 26   BUN 5*  --   --  14 22*   CREATININE <0.5*  --   --  <0.5* 0.6   GLUCOSE 280*  --   --  140* 124*    < > = values in this interval not displayed. Ca/Mg/Phos:   Recent Labs     11/17/20 0331 11/17/20  1220 11/17/20 2057 11/18/20  0536 11/19/20  0400   CALCIUM 8.8 8.1*  --  8.1* 7.1*   MG 1.70*  --  2.20  --   --      Hepatic:   Recent Labs     11/17/20 0331 11/18/20  0536 11/19/20  0400   AST 59* 35 192*   ALT 39 34 70*   BILITOT 1.2* 0.5 0.5   ALKPHOS 85 83 91     Troponin: No results for input(s): TROPONINI in the last 72 hours. BNP: No results for input(s): BNP in the last 72 hours. Lipids: No results for input(s): CHOL, TRIG, HDL, LDLCALC, LABVLDL in the last 72 hours. ABGs:   Recent Labs     11/18/20  1538   PHART 7.332*   PO2ART 165.8*   XRY6OQH 61.7*     INR:   Recent Labs     11/18/20  0536   INR 1.25*     UA:  Recent Labs     11/17/20  1220   SPECGRAV 1.006      Urine Microscopic: No results for input(s): LABCAST, BACTERIA, COMU, HYALCAST, WBCUA, RBCUA, EPIU in the last 72 hours.   Urine Culture: No results for input(s): LABURIN in the last 72 hours. Urine Chemistry:   Recent Labs     11/17/20  1220   NAUR 54                IMAGING:  XR CHEST PORTABLE   Final Result   No pneumothorax. Lucency noted along the right lateral chest wall is not   appreciated on today's study. New hazy left basilar opacity concerning for atelectasis or infection. XR CHEST PORTABLE   Final Result   Small right lateral pneumothorax. Enteric tube with the tip within the proximal stomach and sidehole likely   near the gastroesophageal junction. Recommend advancing. The findings were sent to the Radiology Results [address] at 12:16   pm on 11/18/2020 to be communicated to a licensed caregiver. XR CHEST PORTABLE   Final Result   Satisfactory endotracheal tube placement. Enteric tube terminates in the stomach. The side hole is near the GE   junction. Advancement of 5-10 cm should improve function if to be used for   aspiration. Left pleural effusion with associated basilar airspace disease. XR CHEST PORTABLE   Final Result   Left pleural effusion is reduced to moderate in size. There is no   pneumothorax. Left basilar opacity, likely atelectasis. Right perihilar opacity, likely atelectasis. Pneumonia is still differential concern. XR CHEST PORTABLE   Final Result   Complete opacification of the left hemithorax, may be related to atelectasis,   and large pleural effusion, markedly progressed since the prior study. Patchy airspace opacities in the right infrahilar region, may be related to   mild pulmonary edema versus pneumonia. XR CHEST PORTABLE   Final Result   Bilateral pleural effusions and bibasilar opacities persists suggesting   atelectasis or pneumonia         CT LUMBAR SPINE WO CONTRAST   Final Result   Unremarkable non-contrast CT of the lumbar spine. No discrete sacral bone erosion or destruction evident.       Correlate with CT pelvis for description of soft tissues adjacent to the   sacrum which are incompletely included in the field of view on this exam.         CT PELVIS WO CONTRAST Additional Contrast? None   Final Result   Deep left posterior soft tissue ulcer extending within 2 mm of the proximal   coccyx. No well-defined bony erosions are seen to suggest acute   osteomyelitis. There is fat stranding adjacent to the ulcer compatible with   cellulitis. Nonspecific prominent presacral fat stranding. Simple-appearing free fluid   along the right paracolic gutter. There is fat stranding extending from the skin to the level of the right   ischium compatible with cellulitis. Generalized subcutaneous edema about the pelvis and upper thighs. There is a small hyperdensity at the right bladder base. Unclear if this is   contrast from a recent CT or a true bladder lesion. This could be followed   up with CT or further assessed with a cystoscopy as clinically indicated. There is also air in the bladder. Correlate for recent intervention. CT HEAD WO CONTRAST   Final Result   Evaluation of the parenchyma, skull, and soft tissues at the vertex is motion   degraded. Otherwise, no acute intracranial abnormality. Bifrontal volume loss, greater than expected for age. Chronic sphenoid and ethmoid sinus mucosal disease. XR CHEST PORTABLE   Final Result   Small bilateral effusions and bibasilar airspace disease. Assessment/Plan :      1. Hypernatremia  2. Hypotensive   3. .  Stage 3 Decubitus ulcer  Wound care . abx     5. Acute hypoxic respiratory failure. covid 19 +    intubated   On vent support . 100 % fio2.      Overall prognosis poor   Family has made decision for comfort care  Plan to extubate       Thank you for allowing us to participate in care of Josh Ennis         Electronically signed by: Saskia Daly MD, 11/19/2020, 8:37 AM      [facility]  Office : [phone]  Fax :[phone]

## 2020-11-19 NOTE — PROGRESS NOTES
Nutrition Assessment     Type and Reason for Visit: Reassess    Nutrition Recommendations/Plan:   No nutrition needs at this time     Nutrition Assessment:  Plan of care has been determined and family decided on hospice and comfort care. Pt was terminally extubated today. No nutrition needs at this time. Consult RD if plan of care changes. Malnutrition Assessment:  Malnutrition Status: Mild malnutrition    Nutrition Related Findings: Edema: +4 pitting BLE, +1 pitting BUE, pitting/generalized edema; + BS with LBM 11/16. Elevated Na+ 146, gluc 216; low k+ 2.9, BUN/creat, Mg is wnl.       Current Nutrition Therapies:    No diet orders on file    Anthropometric Measures:  · Height: 5' 7\" (170.2 cm)  · Current Body Wt: 157 lb (71.2 kg)   · BMI: 24.6    Nutrition Diagnosis:   · Inadequate oral intake related to impaired respiratory function, inadequate protein-energy intake as evidenced by NPO or clear liquid status due to medical condition    · Increased nutrient needs related to increase demand for energy/nutrients as evidenced by wounds      Nutrition Interventions:   Food and/or Nutrient Delivery:  Continue Current Diet  Nutrition Education/Counseling:  No recommendation at this time   Coordination of Nutrition Care:  No recommendation at this time    Goals:  Monitor plan of care       Nutrition Monitoring and Evaluation:   Behavioral-Environmental Outcomes:  None Identified   Food/Nutrient Intake Outcomes:  None Identified  Physical Signs/Symptoms Outcomes:  None Identified     Discharge Planning:    No discharge needs at this time     Electronically signed by Thomas Bojorquez RD, LD on 11/19/20 at 11:17 AM EST    Contact: [phone]

## 2020-11-19 NOTE — PROGRESS NOTES
[facility] Pulmonary/CCM Progress note      Admit Date: 11/9/2020    Chief Complaint: Fever and shortness of breath    Subjective: Interval History: Continues to decline, still requiring 3 vasopressors. Hemoglobin 5.1, source appears to be bleeding sacral decubitus ulcer. WBC 34. Extremities appear mottled. Scheduled Meds:   sodium chloride  1,000 mL Intravenous Once    Sodium Hypochlorite   Irrigation BID    sodium chloride  20 mL Intravenous Once    sodium chloride  20 mL Intravenous Once     Continuous Infusions:   vasopressin (Septic Shock) infusion Stopped (11/19/20 1033)    norepinephrine Stopped (11/19/20 1032)    phenylephrine (MIKEL-SYNEPHRINE) 50mg/250mL infusion Stopped (11/19/20 1032)    propofol Stopped (11/19/20 0800)    fentaNYL Stopped (11/19/20 0800)    dextrose 50 mL/hr at 11/19/20 0519    dextrose       PRN Meds:morphine, LORazepam, microfibrillar collagen, magnesium sulfate, glucose, dextrose, glucagon (rDNA), dextrose, potassium chloride, LORazepam, guaiFENesin-dextromethorphan, HYDROcodone-acetaminophen    Review of Systems  Unable to obtain since the patient is intubated, poorly responsive    Objective:     Patient Vitals for the past 8 hrs:   Temp Temp src Pulse Resp SpO2 Height   11/19/20 1107 -- -- -- -- -- 5' 7\" (1.702 m)   11/19/20 1030 -- -- 103 -- (!) 83 % --   11/19/20 1015 -- -- 153 -- -- --   11/19/20 1000 -- -- 127 20 -- --   11/19/20 0945 -- -- 128 -- -- --   11/19/20 0930 -- -- 129 -- -- --   11/19/20 0915 -- -- 132 -- -- --   11/19/20 0900 -- -- 126 -- -- --   11/19/20 0845 -- -- 130 -- -- --   11/19/20 0843 -- -- 129 -- -- --   11/19/20 0830 -- -- 132 -- -- --   11/19/20 0815 -- -- 131 -- -- --   11/19/20 0800 98.6 °F (37 °C) Temporal 131 20 -- --   11/19/20 0745 -- -- 132 -- -- --   11/19/20 0730 -- -- 128 -- -- --   11/19/20 0715 -- -- 128 -- -- --   11/19/20 0700 -- -- 128 -- -- --     I/O last 3 completed shifts:   In: 5573 [I.V.:1038; IV Piggyback:700]  Out: 50 [Urine:50]  No intake/output data recorded. Due to the current efforts to prevent transmission of COVID-19 and also the need to preserve PPE for other caregivers, a face-to-face encounter with the patient was not performed. That being said, all relevant records and diagnostic tests were reviewed, including laboratory results and imaging. Please reference any relevant documentation elsewhere. Care will be coordinated with the primary service. Data Review:  CBC:   Lab Results   Component Value Date    WBC 34.1 11/19/2020    RBC 1.69 11/19/2020    RBC 3.58 02/02/2017     BMP:   Lab Results   Component Value Date    GLUCOSE 124 11/19/2020    CO2 26 11/19/2020    BUN 22 11/19/2020    CREATININE 0.6 11/19/2020    CALCIUM 7.1 11/19/2020     ABG:   Lab Results   Component Value Date    WFE6VUG 32.7 11/18/2020    BEART 7 11/18/2020    A1MKCHMK 99 11/18/2020    PHART 7.332 11/18/2020    RXT2UXF 61.7 11/18/2020    PO2ART 165.8 11/18/2020    ADA7NVB 35 11/18/2020       Radiology: All pertinent images / reports were reviewed as a part of this visit.     Narrative    EXAMINATION:    ONE XRAY VIEW OF THE CHEST         11/15/2020 9:15 pm         COMPARISON:    November 12, 2020         HISTORY:    ORDERING SYSTEM PROVIDED HISTORY: Hypoxemia    TECHNOLOGIST PROVIDED HISTORY:    Reason for exam:->Hypoxemia    Reason for Exam: Hypoxemia    Acuity: Unknown    Type of Exam: Unknown         FINDINGS:    There is a right-sided chest port with its tip at the superior cavoatrial    junction.  The cardiomediastinal silhouette is stable.  There is complete    opacification of the left hemithorax, may be related to atelectasis, and    large pleural effusion, markedly progressed since the prior study.  There are    patchy airspace opacities in the right infrahilar region, may be related to    mild pulmonary edema versus pneumonia.   There is no pneumothorax.  The    osseous structures are stable.              Impression    Complete opacification of the left hemithorax, may be related to atelectasis,    and large pleural effusion, markedly progressed since the prior study.         Patchy airspace opacities in the right infrahilar region, may be related to    mild pulmonary edema versus pneumonia. Problem List:   Acute hypoxic/hypercapnic respiratory failure  Altered mental status  COVID-19 pneumonia/left pleural effusion  Opioid dependence  Decubitus ulcer    Assessment/Plan:     Acute hypoxic/hypercapnic respiratory failure-required reintubation. Overall respiratory status continues to be a concern with persistent hypercapnia. COVID-19 pneumonia with poor response to treatment with remdesivir, convalescent plasma and Decadron. Altered mental status-possibly related to hypercapnia. CT head from 11/9 was negative for acute pathology. We will continue to monitor    Septic shock/acute blood loss anemia-patient is currently on Levophed, phenylephrine and vasopressin. Hemoglobin 5.1, source appears to be from constantly bleeding sacral wound. Family have decided to pursue you comfort care measures only. Overall prognosis guarded, poor quality of life prior to hospitalization. Now has multiorgan failure. Discussed with hospitalist, critical care team will sign off.         Latricia Patterson MD

## 2020-11-19 NOTE — PROGRESS NOTES
Referral from Palliative Care for support to patient's mother at bedside-pt transitioned to comfort care; ventilator support discontinued. Mom sharing stories of Taya's life. Support provided. Prayer with patient and mom as requested.

## 2020-11-19 NOTE — DISCHARGE SUMMARY
[facility] Medicine  Death/Discharge Summary    Jeromy James       :  1975              MRN:  [id number]    Admit date:  2020    Discharge date: 2020    Admitting Physician:  Pedro Almeida MD  Discharge Physician: Allie Rouse            Admission Condition:  critical    Discharged Condition:     History of Present Illness: 57-year-old female presented with altered mental status and fever. Hemant Gonzalez was found to be positive for SARS-CoV-2. Hemant Gonzalez has been treated for severe acute respiratory failure secondary to COVID-19 pneumonia.  Patient had prolonged hospital course which eventually required endotracheal intubation and ventilation.  Today patient is in severe shock requiring 3 pressor support.  Her neurological status also appears to be nonresponsive. Now therapeutic anticoagulation has been discontinued because of profuse bleeding from sacral wound. She has acute blood loss anemia with hemoglobin of 5.1. Discharge Physical Exam:    Called by nurse that patient had become asystole on telemetry. Patient seen and examined. No palpable pulse. Pupils fixed and dilated. No cardiac or pulmonary activity. Patient pronounced dead.      Time of Death: 11:20 AM 2020    Cause of Death: COVID-19 pneumonia, shock        Disposition:   [facility]    Time spent on Discharged 30 minutes      Signed:  Allie Rouse MD  2020, 11:49 AM

## 2020-11-19 NOTE — PROGRESS NOTES
Hospitalist Progress Note      PCP: Referring Not In System (Inactive)    Date of Admission: 11/9/2020    Chief Complaint: Altered mental status and unresponsiveness    Hospital Course: 38-year-old female presented for altered mental status and the fever. Patient found to have COVID-19 pneumonia with peak oxygen requirement of 60 L of high flow nasal cannula. Patient also had infected decubitus ulcer. Patient was hypotensive and required ICU admission. Patient is currently hemodynamically stable without symptoms of sepsis. She continues to require continuous BiPAP in view of hypercapnia. She is receiving remdesivir and Decadron. Has history of recent PE on Lovenox. Morning of 1120 patient was intubated for progressive hypoxia. She is also in shock requiring Levophed for circulatory support. Subjective: Patient seen and examined. Ventilated and sedated. Unresponsive, does not respond to sternal rub.       Medications:  Reviewed    Infusion Medications    vasopressin (Septic Shock) infusion Stopped (11/19/20 1033)    norepinephrine Stopped (11/19/20 1032)    phenylephrine (MIKEL-SYNEPHRINE) 50mg/250mL infusion Stopped (11/19/20 1032)    propofol Stopped (11/19/20 0800)    fentaNYL Stopped (11/19/20 0800)    dextrose 50 mL/hr at 11/19/20 0519    dextrose       Scheduled Medications    sodium chloride  1,000 mL Intravenous Once    Sodium Hypochlorite   Irrigation BID    sodium chloride  20 mL Intravenous Once    sodium chloride  20 mL Intravenous Once     PRN Meds: morphine, LORazepam, microfibrillar collagen, magnesium sulfate, glucose, dextrose, glucagon (rDNA), dextrose, potassium chloride, LORazepam, guaiFENesin-dextromethorphan, HYDROcodone-acetaminophen      Intake/Output Summary (Last 24 hours) at 11/19/2020 1152  Last data filed at 11/18/2020 1358  Gross per 24 hour   Intake 1737.98 ml   Output 50 ml   Net 1687.98 ml       Physical Exam Performed:    /69   Pulse 103   Temp INR 1.25*     No results for input(s): Nicole Marcus in the last 72 hours. Urinalysis:      Lab Results   Component Value Date    NITRU Negative 11/11/2020    WBCUA 3 11/04/2020    BACTERIA 3+ 09/18/2020    RBCUA 7 11/04/2020    BLOODU Negative 11/11/2020    SPECGRAV 1.006 11/17/2020    GLUCOSEU Negative 11/11/2020       Radiology:  XR CHEST PORTABLE   Final Result   No pneumothorax. Lucency noted along the right lateral chest wall is not   appreciated on today's study. New hazy left basilar opacity concerning for atelectasis or infection. XR CHEST PORTABLE   Final Result   Small right lateral pneumothorax. Enteric tube with the tip within the proximal stomach and sidehole likely   near the gastroesophageal junction. Recommend advancing. The findings were sent to the Radiology Results [address] at 12:16   pm on 11/18/2020 to be communicated to a licensed caregiver. XR CHEST PORTABLE   Final Result   Satisfactory endotracheal tube placement. Enteric tube terminates in the stomach. The side hole is near the GE   junction. Advancement of 5-10 cm should improve function if to be used for   aspiration. Left pleural effusion with associated basilar airspace disease. XR CHEST PORTABLE   Final Result   Left pleural effusion is reduced to moderate in size. There is no   pneumothorax. Left basilar opacity, likely atelectasis. Right perihilar opacity, likely atelectasis. Pneumonia is still differential concern. XR CHEST PORTABLE   Final Result   Complete opacification of the left hemithorax, may be related to atelectasis,   and large pleural effusion, markedly progressed since the prior study. Patchy airspace opacities in the right infrahilar region, may be related to   mild pulmonary edema versus pneumonia.          XR CHEST PORTABLE   Final Result   Bilateral pleural effusions and bibasilar opacities persists suggesting Septic shock ([address] [A41.9, R65.21]    Lactic acidosis [E87.2]    Bandemia [D72.825]    Endotracheally intubated [Z97.8]    On mechanically assisted ventilation (HCC) [Z99.11]    E coli infection [A49.8]    Elevated C-reactive protein (CRP) [R79.82]    Thrombocytopenia (HCC) [D69.6]    Chronic anemia [D64.9]    History of pulmonary embolism [Z86.711]    Seizure disorder ([address] [G40.909]    Ex-smoker [Z87.891]    Functional quadriplegia ([address] [R53.2]    Sepsis ([address] [A41.9]    Sacral wound [S31.000A]    Pulmonary embolus ([address] [I26.99]    Enterococcus UTI [N39.0, B95.2]    Acute respiratory failure with hypoxemia (HCC) [J96.01]    Vitamin B12 deficiency [E53.8]    Intestinal malabsorption following gastrectomy [K91.2, Z90.3]     Acute hypoxic/hypercapnic respiratory failure  Required intubation morning of 11/17/2020  Currently ventilated  Secondary to COVID-19 pneumonia  Pulmonology following  Continue with BiPAP    Altered mental status  Secondary to hypercapnia  Patient is deeply sedated  Neurological exam is concerning  We will need to be reevaluated once sedation is eased off    Hypernatremia  On hypotonic fluids  Nephrology following    Sacral decubitus ulcer stage III  Wound culture grew E. coli  Would benefit from debridement, however, held back by respiratory status  General surgery following  ID on board, currently off antibiotics    DVT Prophylaxis: Lovenox  Diet: No diet orders on file  Code Status: [facility]    Electronically signed by Antelmo Alfredo MD on 11/19/2020 at 11:52 AM

## 2020-11-19 NOTE — PROGRESS NOTES
[facility] called with time of death of 1120. [facility] will NOT be following and OK to release body.     Ref #: Savannah Christopher RN

## 2020-11-19 NOTE — PROGRESS NOTES
11/19/20 0843   Vent Patient Data   Plateau Pressure 22 FXX66   Static Compliance 40 mL/cmH2O   Dynamic Compliance 33.3 mL/cmH2O

## 2020-11-20 LAB
CULTURE, RESPIRATORY: ABNORMAL
GRAM STAIN RESULT: ABNORMAL
ORGANISM: ABNORMAL
ORGANISM: ABNORMAL

## 2020-11-20 NOTE — ADT AUTH CERT
2.84 (L)    Hemoglobin Quant: 8.8 (L)    Hematocrit: 28.9 (L)    MCV: 101.7 (H)    Lactate, ser/plas: 4.85 (HH)          Meds: 1L NS IV BOLUS x1, buspar 5mg po twice daily, decadron 10mg IV daily, lovenox 70mg subq twice daily, folic acid 1mg po daily, zyvox 600mg IV Q12hrs, meropenem 1gm IV Q8hrs, phenergan 25mg po x1, seroquel 25mg po nightly, remdesivir 100mg IV Q24hrs, multivitamin 1 tablet po daily, vitamin B 12 500mcg po daily, vitamin C 500mg po daily, D5% IV @50ml/hr, fetnanyl drip IV @ 25mcg/hr, levophed drip IV @2mcg/min, neosynephrine drip IV @100mcg/min, diprivan drip IV @ 10mcg/kg/min, vasopressin drip IV @ 0.03units/min, ativan 0.5mg IV PRN x2          **nephrology note*    Intubated    On vent support now    Overall condition worsened    Acute respiratory failure    Hypotensive               Assessment/Plan :              1.  Hypernatremia    Improving    Decrease the D5W to 50 ml/ hr         2. BP stable         3. Hypokalemia - supplement as needed         4.  Stage 3 Decubitus ulcer    Wound care    abx         5. Acute hypoxic respiratory failure. covid 19 +     intubated    On vent support .  100 % fio2.         Overall prognosis poor          *IM NOTE    Acute hypoxic/hypercapnic respiratory failure    Required intubation morning of 11/17/2020    Currently ventilated    Secondary to COVID-19 pneumonia    Pulmonology following    Continue with BiPAP         Altered mental status    Secondary to hypercapnia    Patient is deeply sedated    Neurological exam is concerning    We will need to be reevaluated once sedation is eased off         Hypernatremia    On hypotonic fluids    Nephrology following         Sacral decubitus ulcer stage III    Wound culture grew E. coli    Would benefit from debridement, however, held back by respiratory status    General surgery following    ID on board, currently off antibiotics         DVT Prophylaxis: Lovenox    Diet: No diet orders on file    Code Status: DNR-CC         *pulmonology note    Problem List:    Acute hypoxic/hypercapnic respiratory failure    Altered mental status    COVID-19 pneumonia/left pleural effusion    Opioid dependence    Decubitus ulcer         Assessment/Plan:         Acute hypoxic/hypercapnic respiratory failure-required reintubation.  Continues on volume assist control mode of ventilation, increased respiratory rate to 20.  Monitor ABG closely.  Chest x-ray shows a small right lateral pneumothorax-only requires monitoring.  Left basal infiltrate has now disappeared, suggestive of improved atelectasis rather than pleural effusion as previously thought.         Altered mental status-possibly related to hypercapnia.  CT head from 11/9 was negative for acute pathology.  We will continue to monitor         Septic shock/hypotension-patient is currently on Levophed, phenylephrine and vasopressin.  Femoral central line and arterial line were inserted at bedside today.  Lactate 4.85.  Treat for secondary bacterial infection with empiric antibiotics.  WBC 34 today.  Patient is currently on meropenem and Zyvox.  Try to titrate down phenylephrine.         Hypernatremia-has resolved, sodium of 141.  Though urine osmolarity was low, given the low urine output now along with a normal sodium makes diabetes insipidus unlikely.  Oliguria possibly from septic shock/poor perfusion.  Creatinine is currently stable.         Continue remdesivir and Decadron for COVID-19 pneumonia.  Recent PE on Lovenox.  D-dimer 300.         Hold tube feeds today.         Long discussion with patient's mother and son at bedside. Bev Garcia had poor quality of life overall, has been wheelchair/bedbound for some time-has deep sacral decubitus ulcer.  Currently DNR-CCA.  No further escalation of care or invasive procedures. Lorna Grajeda do have an understanding that the overall prognosis is guarded, particularly in view of recurrent need for intubation for COVID-19.           ID NOTE      Assessment:         The patient is a 50 y.o. old female who    Past Medical History in [facility] (no negatives)     has a past medical history of Adult failure to thrive, Allergic, Anemia, Anxiety, Calculus of kidney, CHF (congestive heart failure) ([facility] [address] Chronic neck pain, Copper deficiency, Depression, Hypertension, Pulmonary emboli ([facility] [address] Sciatica, Seizures ([facility] [address] (3/2014), and Vitamin B12 deficiency neuropathy ([facility] [address]  with following problems:         · Acute respiratory failure with hypoxia-has been intubated on 10/16/2020 night-remains on ventilator    · Septic shock with hypotension requiring vasopressors, severe lactic acidosis, severe bandemia-this is a new finding    · Right-sided pneumothorax-this is new finding    · Severe COVID-19 pneumonia-has been on remdesivir and Decadron    · Elevated CRP-monitor trends    · Thrombocytopenia  -resolved    · Coccygeal wound infection with E. coli-has received 7 days of gram-negative coverage. · A+ blood group    · Chronic anemia-hemoglobin stable    · History of pulmonary embolism    · Seizure disorder    · Ex smoker              Discussion:           Patient remains on IV remdesivir. Sinda Flores is day 9 of IV remdesivir.         White cell count has gone up to 34,600 today.         Patient is also developed serial lactic acidosis with serum lactate of 4.85.  Patient has become hypotensive and is in septic shock         Liver enzymes are okay.         D-dimer is 216.         I had ordered 2 sets of blood cultures yesterday, which are in process.         Chest x-ray reviewed.  Patient has developed small right lateral pneumothorax.         Has a Port-A-Cath in place         Plan:         Diagnostic Workup:         · Continue to monitor LDH, D-dimer, CRP, procalcitonin every other day for now. · Continue to follow  fever curve, WBC count and blood cultures. · Follow up on blood counts, liver and renal function.     · Continue to watch for any hyperglycemia, transaminitis, renal dysfunction, anemia, constipation issues while on remdesivir. · Follow-up on blood cultures    · Follow-up on tracheal aspirate culture nasal MRSA screening culture from yesterday              Antimicrobials:         · Will continue IV remdesivir 100 mg every 24 hour.  Plan for total of 10-day course of remdesivir    · Patient has developed septic shock.  Agree with starting IV linezolid 600 mg every 12 hour empirically    · Agree with starting IV meropenem 1 g every 8 hours    · Vasopressor support to maintain mean arterial pressure greater than 65 mmHg    · Continue Decadron daily at a dose of 10 mg IV daily    · Endotracheal tube care and pulmonary toileting    · Anticoagulation per primary and pulmonary. · Continue to monitor vitals closely. · Continue ventilator support to oxygen saturation above 92%. · Maintain good glycemic control. · Continue to watch for new or worsening fever or productive cough or other signs of secondary bacterial infection. · Fall and aspiration precautions. · COVID-19 isolation precautions. · Has clinically worsened significantly over the past 48 hours.     · She is critically ill.  High risk of morbidity and mortality    · Continue close monitoring COVID-19 ICU              Drug Monitoring:         · Continue monitoring for antimicrobial agent toxicity as follows: CBC, CMP    · Continue to watch for following: new or worsening fever, new hypotension, hives, lip swelling and redness or purulence at vascular access sites

## 2020-11-21 LAB
BLOOD CULTURE, ROUTINE: NORMAL
CULTURE, BLOOD 2: NORMAL

## 2021-02-10 NOTE — PROGRESS NOTES
Patient Active Problem List   Diagnosis    Vitamin B12 deficiency    Intestinal malabsorption following gastrectomy    KETAN (iron deficiency anemia)    Copper deficiency myeloneuropathy ([address]    Pulmonary embolus ([address]    Enterococcus UTI    Functional quadriplegia ([address]    Sepsis ([address]    Pressure injury of sacral region, stage 4 ([address]   H&P dictated [FreeTextEntry1] : ANxiety  chronic pain [de-identified] : I have reviewed the verbal understanding (verbal contract) that patient has agreed to in the past for management of chronic pain, and the patient continues to agree that:\par -pain at times debilitating and he cannot function without current medications\par -agrees to random testing to test for the presence of the prescribed medication or other illicit drugs in their system, either through blood or urine testing\par -agrees not selling or sharing medications under ANY circumstances\par -Patient understands that under no circumstances can I fill medications "early" before they are due, even if their medications are lost or stolen for any reason whatsoever\par had not tried dose reduction but takes "breaks" during some days where he doesn't take it\par -Patient agrees that they may be subject to dose reduction attempts in the future even if they have failed in the past and that they may should be willing to try adjunctive non-narcotic medications such as NSAIDS, duloxetine, gabapentin or others in attempts to reduce their opiate usage EVEN IF THEY HAVE FAILED THIS ATTEMPT IN THE PAST\par \par \par Concerned about thyroid\par -still with low energy\par -not sure if she's depressed\par \par

## 2024-07-23 NOTE — ACP (ADVANCE CARE PLANNING)
----- Message from Donna Hall ADVANCED CARE PLANNING    Name:Elham Bae       :  1975              MRN:  [id number]      Purpose of Encounter: Advanced care planning in light of ventilator dependent respiratory failure, COVID-19 pneumonia, severe septic shock. Parties in attendance: :Fausto Reilly MD, Family members: Sarah Ornelas, mother. Decisional Capacity:No    Diagnosis: Active Problems:    Vitamin B12 deficiency    Intestinal malabsorption following gastrectomy    Acute respiratory failure with hypoxemia (HCC)    Pulmonary embolus (HCC)    Enterococcus UTI    Functional quadriplegia (HCC)    Sepsis (HCC)    Sacral wound    Elevated C-reactive protein (CRP)    Thrombocytopenia (HCC)    Chronic anemia    History of pulmonary embolism    Seizure disorder (HCC)    Ex-smoker    E coli infection    Endotracheally intubated    On mechanically assisted ventilation (HCC)  Resolved Problems:    * No resolved hospital problems. *    Patients Medical Story: 47-year-old female presented with altered mental status and fever. She was found to be positive for SARS-CoV-2. She has been treated for severe acute respiratory failure secondary to COVID-19 pneumonia. Patient had prolonged hospital course which eventually required endotracheal intubation and ventilation. Today patient is in severe shock requiring 3 pressor support. Her neurological status also appears to be nonresponsive. Goals of Care Determinations: Had discussion with mother, explained that Leoncio Alexis is unlikely to have meaningful recovery at this point. Explained that if she wishes to keep her daughter comfortable withdrawal of care would be appropriate. Sarah Ornelas expressed desire to continue watching Elham's condition over the next few hours before she makes her decision on care withdrawal.  Plan: Will notify Referring Not In System (Inactive) of change in care plan. Will look at further interventions as needed. Code Status:  At this time patient wishes to be DNR-CCA  Time Spent with Patient: 20 minutes      Electronically signed by Marco A Vieira MD on 11/18/2020 at 1:35 PM  Thank you Referring Not In System (Inactive) for the opportunity to be involved in this patient's care.
